# Patient Record
Sex: MALE | Race: WHITE | NOT HISPANIC OR LATINO | URBAN - METROPOLITAN AREA
[De-identification: names, ages, dates, MRNs, and addresses within clinical notes are randomized per-mention and may not be internally consistent; named-entity substitution may affect disease eponyms.]

---

## 2023-03-14 PROBLEM — Z00.00 ENCOUNTER FOR PREVENTIVE HEALTH EXAMINATION: Status: ACTIVE | Noted: 2023-03-14

## 2023-03-20 ENCOUNTER — OUTPATIENT (OUTPATIENT)
Dept: OUTPATIENT SERVICES | Facility: HOSPITAL | Age: 58
LOS: 1 days | End: 2023-03-20
Payer: MEDICAID

## 2023-03-20 DIAGNOSIS — Z00.8 ENCOUNTER FOR OTHER GENERAL EXAMINATION: ICD-10-CM

## 2023-03-20 DIAGNOSIS — R94.39 ABNORMAL RESULT OF OTHER CARDIOVASCULAR FUNCTION STUDY: ICD-10-CM

## 2023-03-20 PROCEDURE — 78452 HT MUSCLE IMAGE SPECT MULT: CPT

## 2023-03-20 PROCEDURE — 78452 HT MUSCLE IMAGE SPECT MULT: CPT | Mod: 26

## 2023-03-20 PROCEDURE — A9500: CPT

## 2023-03-20 PROCEDURE — 93018 CV STRESS TEST I&R ONLY: CPT

## 2023-03-21 DIAGNOSIS — R94.39 ABNORMAL RESULT OF OTHER CARDIOVASCULAR FUNCTION STUDY: ICD-10-CM

## 2023-03-24 VITALS — DIASTOLIC BLOOD PRESSURE: 60 MMHG | SYSTOLIC BLOOD PRESSURE: 138 MMHG

## 2023-03-24 NOTE — H&P CARDIOLOGY - HISTORY OF PRESENT ILLNESS
Patient is a 57y Male who has PMH of CAD S/P PCI, DM, HPL, HTN who has been having SOB on exertion and had (+) NMNST 3/20/23 and presents to the cardiology department for elective cardiac catherization.       < from: NM Nuclear Stress Pharmacologic Multiple (03.20.23 @ 15:37) >    Impression:    1.  Thereis a medium-sized perfusion defect involving the basal to mid   inferior and basal to mid inferolateral walls of the left ventricle. On   the resting images, this defect is primarily reversible suggesting   ischemia.  2.  LVEF is greater than 55%.  3.  This is a low risk positive result.          Vital Signs Last 24 Hrs  T(C): --  T(F): --  HR: --  BP: --  BP(mean): --  RR: --  SpO2: --        Pre cath note:  indication:  [ ] STEMI                [ ] NSTEMI                 [ ] Acute coronary syndrome                   [ ]Unstable Angina   [ ] high risk  [ ] intermediate risk  [ ] low risk                   [ x] Stable Angina     non-invasive testing:     NMNST                     Date:  3/20/23                   result: [ ] high risk  [x ] intermediate risk  [ ] low risk    Anti- Anginal medications:                    [ ] not used                       [ ] used                   [ ] not used but strong indication not to use    Ejection Fraction                   [ ] <29            [ ] 30-39%   [ ] 40-49%     [ ]>50%    CHF                   [ ] active (within last 14 days on meds   [ ] Chronic (on meds but no exacerbation)    COPD                   [ ] mild (on chronic bronchodilators)  [ ] moderate (on chronic steroid therapy)      [ ] severe (indication for home O2 or PACO2 >50)    Other risk factors:                     [ ] Previous MI                     [ ] CVA/ stroke                    [ ] carotid stent/ CEA                    [ ] PVD/PAD- (arterial aneurysm, non-palpable pulses, tortuous vessel with inability to insert catheter, infra-renal dissection, renal or subclavian artery stenosis)                    [ ] diabetic                    [ ] previous CABG                    [ ] Renal Failure     Bleeding Risk: 0.5%      RIGHT RADIAL ARTERY EVALUATION:  ROLANDO TEST: [] Negative          [] Positive  BARBEAU TEST: [] Class A           [] Class B           [] Class C            [] Class D    REVIEW OF SYSTEMS:  CONSTITUTIONAL: No fever, weight loss, or fatigue  CARDIOLOGY: PAtient denies chest pain, shortness of breath or syncopal episodes.   RESPIRATORY: denies shortness of breath, wheezing  NEUROLOGICAL: NO weakness, no focal deficits to report.  ENDOCRINOLOGICAL: no recent change in diabetic medications.   GI: no BRBPR, no N,V,diarrhea.     PHYSICAL EXAM:  · CONSTITUTIONAL:	Well-developed, well nourished    ·RESPIRATORY:   airway patent; breath sounds equal; good air movement; respirations non-labored; clear to auscultation bilaterally; no chest wall tenderness; no intercostal retractions; no rales,rhonchi or wheeze  · CARDIOVASCULAR	regular rate and rhythm  no rub  no murmur  normal PMI  · EXTREMITIES: No cyanosis, clubbing or edema  · VASCULAR: 	Equal and normal pulses (carotid, femoral, dorsalis pedis)  	      EF:   EKG:  Patient is a 57y Male who has PMH of CAD S/P PCI, DM, HPL, HTN who has been having SOB on exertion and had (+) NST 3/20/23 and presents today for [facility] with possible intervention.       < from: NM Nuclear Stress Pharmacologic Multiple (03.20.23 @ 15:37) >    Impression:    1.  Thereis a medium-sized perfusion defect involving the basal to mid   inferior and basal to mid inferolateral walls of the left ventricle. On   the resting images, this defect is primarily reversible suggesting   ischemia.  2.  LVEF is greater than 55%.  3.  This is a low risk positive result.        Pre cath note:  indication:  [ ] STEMI                [ ] NSTEMI                 [ ] Acute coronary syndrome                   [ ]Unstable Angina   [ ] high risk  [ ] intermediate risk  [ ] low risk                   [ x] Stable Angina     non-invasive testing:     NST                     Date:  3/20/23                   result: [ ] high risk  [x ] intermediate risk  [ ] low risk    Anti- Anginal medications:                    [ ] not used                       [ ] used                   [ ] not used but strong indication not to use    Ejection Fraction                   [ ] <29            [ ] 30-39%   [ ] 40-49%     [ ]>50%    CHF                   [ ] active (within last 14 days on meds   [ ] Chronic (on meds but no exacerbation)    COPD                   [ ] mild (on chronic bronchodilators)  [ ] moderate (on chronic steroid therapy)      [ ] severe (indication for home O2 or PACO2 >50)    Other risk factors:                     [ ] Previous MI                     [ ] CVA/ stroke                    [ ] carotid stent/ CEA                    [ ] PVD/PAD- (arterial aneurysm, non-palpable pulses, tortuous vessel with inability to insert catheter, infra-renal dissection, renal or subclavian artery stenosis)                    [ ] diabetic                    [ ] previous CABG                    [ ] Renal Failure     Bleeding Risk: 0.5%    EF 3/20/23: 55%

## 2023-03-27 ENCOUNTER — OUTPATIENT (OUTPATIENT)
Dept: INPATIENT UNIT | Facility: HOSPITAL | Age: 58
LOS: 1 days | Discharge: HOME | End: 2023-03-27
Payer: MEDICAID

## 2023-03-27 DIAGNOSIS — Z95.5 PRESENCE OF CORONARY ANGIOPLASTY IMPLANT AND GRAFT: ICD-10-CM

## 2023-03-27 DIAGNOSIS — R94.39 ABNORMAL RESULT OF OTHER CARDIOVASCULAR FUNCTION STUDY: ICD-10-CM

## 2023-03-27 DIAGNOSIS — R06.02 SHORTNESS OF BREATH: ICD-10-CM

## 2023-03-27 DIAGNOSIS — I25.118 ATHEROSCLEROTIC HEART DISEASE OF NATIVE CORONARY ARTERY WITH OTHER FORMS OF ANGINA PECTORIS: ICD-10-CM

## 2023-03-27 LAB
ANION GAP SERPL CALC-SCNC: 12 MMOL/L — SIGNIFICANT CHANGE UP (ref 7–14)
BUN SERPL-MCNC: 17 MG/DL — SIGNIFICANT CHANGE UP (ref 10–20)
CALCIUM SERPL-MCNC: 9 MG/DL — SIGNIFICANT CHANGE UP (ref 8.4–10.4)
CHLORIDE SERPL-SCNC: 99 MMOL/L — SIGNIFICANT CHANGE UP (ref 98–110)
CO2 SERPL-SCNC: 22 MMOL/L — SIGNIFICANT CHANGE UP (ref 17–32)
CREAT SERPL-MCNC: 1 MG/DL — SIGNIFICANT CHANGE UP (ref 0.7–1.5)
EGFR: 86 ML/MIN/1.73M2 — SIGNIFICANT CHANGE UP
GLUCOSE BLDC GLUCOMTR-MCNC: 149 MG/DL — HIGH (ref 70–99)
GLUCOSE SERPL-MCNC: 144 MG/DL — HIGH (ref 70–99)
HCT VFR BLD CALC: 40.5 % — LOW (ref 42–52)
HCT VFR BLD CALC: 42.3 % — SIGNIFICANT CHANGE UP (ref 42–52)
HGB BLD-MCNC: 12.9 G/DL — LOW (ref 14–18)
HGB BLD-MCNC: 14 G/DL — SIGNIFICANT CHANGE UP (ref 14–18)
MCHC RBC-ENTMCNC: 27.6 PG — SIGNIFICANT CHANGE UP (ref 27–31)
MCHC RBC-ENTMCNC: 28.3 PG — SIGNIFICANT CHANGE UP (ref 27–31)
MCHC RBC-ENTMCNC: 31.9 G/DL — LOW (ref 32–37)
MCHC RBC-ENTMCNC: 33.1 G/DL — SIGNIFICANT CHANGE UP (ref 32–37)
MCV RBC AUTO: 85.6 FL — SIGNIFICANT CHANGE UP (ref 80–94)
MCV RBC AUTO: 86.5 FL — SIGNIFICANT CHANGE UP (ref 80–94)
NRBC # BLD: 0 /100 WBCS — SIGNIFICANT CHANGE UP (ref 0–0)
NRBC # BLD: 0 /100 WBCS — SIGNIFICANT CHANGE UP (ref 0–0)
PLATELET # BLD AUTO: 196 K/UL — SIGNIFICANT CHANGE UP (ref 130–400)
PLATELET # BLD AUTO: 208 K/UL — SIGNIFICANT CHANGE UP (ref 130–400)
POTASSIUM SERPL-MCNC: 6.1 MMOL/L — CRITICAL HIGH (ref 3.5–5)
POTASSIUM SERPL-SCNC: 6.1 MMOL/L — CRITICAL HIGH (ref 3.5–5)
RBC # BLD: 4.68 M/UL — LOW (ref 4.7–6.1)
RBC # BLD: 4.94 M/UL — SIGNIFICANT CHANGE UP (ref 4.7–6.1)
RBC # FLD: 15 % — HIGH (ref 11.5–14.5)
RBC # FLD: 15.2 % — HIGH (ref 11.5–14.5)
SODIUM SERPL-SCNC: 133 MMOL/L — LOW (ref 135–146)
WBC # BLD: 4.35 K/UL — LOW (ref 4.8–10.8)
WBC # BLD: 4.4 K/UL — LOW (ref 4.8–10.8)
WBC # FLD AUTO: 4.35 K/UL — LOW (ref 4.8–10.8)
WBC # FLD AUTO: 4.4 K/UL — LOW (ref 4.8–10.8)

## 2023-03-27 PROCEDURE — C1769: CPT

## 2023-03-27 PROCEDURE — 93010 ELECTROCARDIOGRAM REPORT: CPT

## 2023-03-27 PROCEDURE — 93005 ELECTROCARDIOGRAM TRACING: CPT

## 2023-03-27 PROCEDURE — 92928 PRQ TCAT PLMT NTRAC ST 1 LES: CPT | Mod: LC

## 2023-03-27 PROCEDURE — 36415 COLL VENOUS BLD VENIPUNCTURE: CPT

## 2023-03-27 PROCEDURE — 82962 GLUCOSE BLOOD TEST: CPT

## 2023-03-27 PROCEDURE — 92978 ENDOLUMINL IVUS OCT C 1ST: CPT | Mod: LC

## 2023-03-27 PROCEDURE — C1894: CPT

## 2023-03-27 PROCEDURE — 80048 BASIC METABOLIC PNL TOTAL CA: CPT

## 2023-03-27 PROCEDURE — C1753: CPT

## 2023-03-27 PROCEDURE — C1887: CPT

## 2023-03-27 PROCEDURE — C1725: CPT

## 2023-03-27 PROCEDURE — 93458 L HRT ARTERY/VENTRICLE ANGIO: CPT | Mod: 59

## 2023-03-27 PROCEDURE — 85027 COMPLETE CBC AUTOMATED: CPT

## 2023-03-27 PROCEDURE — C1874: CPT

## 2023-03-27 RX ORDER — CLOPIDOGREL BISULFATE 75 MG/1
1 TABLET, FILM COATED ORAL
Qty: 30 | Refills: 3
Start: 2023-03-27

## 2023-03-27 NOTE — PROGRESS NOTE ADULT - SUBJECTIVE AND OBJECTIVE BOX
Interventional Cardiology discharge Note:  -s/p PCI/MACY OM1    AROLDO BOSS   58y Male    PAST MEDICAL & SURGICAL HISTORY:  CAD S/P percutaneous coronary angioplasty    DM (diabetes mellitus)    Hyperlipidemia    HTN (hypertension)        HPI:  Patient is a 57y Male who has PMH of CAD S/P PCI, DM, HPL, HTN who has been having SOB on exertion and had (+) NST 3/20/23 and presents today for [facility] with possible intervention.       < from: NM Nuclear Stress Pharmacologic Multiple (03.20.23 @ 15:37) >    Impression:    1.  Thereis a medium-sized perfusion defect involving the basal to mid   inferior and basal to mid inferolateral walls of the left ventricle. On   the resting images, this defect is primarily reversible suggesting   ischemia.  2.  LVEF is greater than 55%.  3.  This is a low risk positive result.    Allergies    No Known Allergies      Patient seen and examined at bedside.  Patient without complaints  Denies CP, SOB, palpitations, or dizziness    Vital Signs Last 24 Hrs    HR: --67  BP: -- 137/76  RR: --16  SpO2: -- 100%      REVIEW OF SYSTEMS:          All negative except as mentioned in HPI    PHYSICAL EXAM:           CONSTITUTIONAL: Well-developed; well-nourished; in no acute distress  	SKIN: warm, dry  	HEAD: Normocephalic; atraumatic  	EYES: PERRL.  	ENT: No nasal discharge, airway clear, mucous membranes moist  	NECK: Supple; non tender.  	CARD: +S1, +S2, no murmurs, gallops, or rubs. Regular rate and rhythm    	RESP: No wheezes, rales or rhonchi. CTA B/L  	ABD: soft ntnd, + BS x 4 quadrants  	EXT: moves all extremities,  no clubbing, cyanosis or edema  	NEURO: Alert and oriented x3, no focal deficits          PSYCH: Cooperative, appropriate          VASCULAR:  + Rad / + PTs / + DPs          EXTREMITY:         Right Radial: D-stat in place, access site soft, no hematoma, no pain, + pulses, no sign of infection, no numbness            ECG: pending                                                                                                                 LABS:                        12.9   4.40  )-----------( 208      ( 27 Mar 2023 08:10 )             40.5     03-27    133<L>  |  99  |  17  ----------------------------<  144<H>  6.1<HH>   |  22  |  1.0    Ca    9.0      27 Mar 2023 08:10      A/P:  I discussed the case with Cardiologist Dr. Lam and recommend the following:    S/P PCI:    PCI/MACY OM1    	         Continue DAPT ( Aspirin 81 mg PO Daily and Plavix 75mg PO daily),  B-Blocker, Statin Therapy                   Patient given 30 day supply of ( Aspirin 81 mg daily and Plavix 75 mg daily ) to take at home                   **GI prophylaxis with Pepcid                   CBC @ 1400                   EKG prior to d/c @ 1400                   NS @ 150 ml/hr x 6hrs                   **OOB to chair/Ambulate with assistance                   Monitor access site/ distal pulses                   Patient agreeing to take DAPT for at least one year or as directed by cardiologist                    Pt given instructions on importance of taking antiplatelet medication or risk acute stent thrombosis/death                   Post cath instructions, access site care and activity restrictions reviewed with patient                     Discussed with patient to return to hospital if experience chest pain, shortness breath, dizziness and site bleeding                   Aggressive risk factor modification, diet counseling, smoking cessation discussed with patient                    Benefits of cardiac rehab discussed with patient and all documents sent to cardiac rehab center                   Patient instructed to call cardiac rehab and make initial appointment after first follow-up visit with Cardiologist                    Can discharge patient @ 1600 from cardiac standpoint after ambulating without symptoms, access site wnl, labs and ECG reviewed                    Follow up with Cardiology Dr. Lam in two weeks.  Instructed to call and make an appointment

## 2023-03-30 DIAGNOSIS — R94.31 ABNORMAL ELECTROCARDIOGRAM [ECG] [EKG]: ICD-10-CM

## 2023-03-31 DIAGNOSIS — R94.31 ABNORMAL ELECTROCARDIOGRAM [ECG] [EKG]: ICD-10-CM

## 2023-06-12 PROBLEM — Z00.00 ENCOUNTER FOR PREVENTIVE HEALTH EXAMINATION: Status: ACTIVE | Noted: 2023-06-12

## 2023-06-16 ENCOUNTER — APPOINTMENT (OUTPATIENT)
Dept: CARDIOLOGY | Facility: CLINIC | Age: 58
End: 2023-06-16
Payer: MEDICAID

## 2023-06-16 VITALS
HEIGHT: 68 IN | BODY MASS INDEX: 45.47 KG/M2 | SYSTOLIC BLOOD PRESSURE: 130 MMHG | DIASTOLIC BLOOD PRESSURE: 76 MMHG | WEIGHT: 300 LBS | HEART RATE: 83 BPM

## 2023-06-16 DIAGNOSIS — M79.89 OTHER SPECIFIED SOFT TISSUE DISORDERS: ICD-10-CM

## 2023-06-16 PROCEDURE — 99204 OFFICE O/P NEW MOD 45 MIN: CPT

## 2023-07-04 LAB
ALBUMIN SERPL ELPH-MCNC: 4.2 G/DL
ALP BLD-CCNC: 63 U/L
ALT SERPL-CCNC: 23 U/L
ANION GAP SERPL CALC-SCNC: 12 MMOL/L
AST SERPL-CCNC: 17 U/L
BILIRUB SERPL-MCNC: 0.2 MG/DL
BUN SERPL-MCNC: 11 MG/DL
CALCIUM SERPL-MCNC: 9.4 MG/DL
CHLORIDE SERPL-SCNC: 100 MMOL/L
CHOLEST SERPL-MCNC: 112 MG/DL
CHOLEST/HDLC SERPL: 2.7 RATIO
CO2 SERPL-SCNC: 23 MMOL/L
CREAT SERPL-MCNC: 0.9 MG/DL
EGFR: 98 ML/MIN/1.73M2
ESTIMATED AVERAGE GLUCOSE: 171 MG/DL
GLUCOSE SERPL-MCNC: 106 MG/DL
HBA1C MFR BLD HPLC: 7.6 %
HDLC SERPL-MCNC: 41 MG/DL
LDLC SERPL CALC-MCNC: 54 MG/DL
NONHDLC SERPL-MCNC: 70 MG/DL
NT-PROBNP SERPL-MCNC: 135 PG/ML
POTASSIUM SERPL-SCNC: 4.6 MMOL/L
PROT SERPL-MCNC: 6.6 G/DL
SODIUM SERPL-SCNC: 135 MMOL/L
TRIGL SERPL-MCNC: 80 MG/DL
TSH SERPL-ACNC: 1.52 UIU/ML

## 2023-08-14 ENCOUNTER — APPOINTMENT (OUTPATIENT)
Dept: CARDIOLOGY | Facility: CLINIC | Age: 58
End: 2023-08-14
Payer: MEDICAID

## 2023-08-14 PROCEDURE — 93306 TTE W/DOPPLER COMPLETE: CPT

## 2023-08-14 PROCEDURE — 93970 EXTREMITY STUDY: CPT

## 2023-08-18 ENCOUNTER — APPOINTMENT (OUTPATIENT)
Dept: CARDIOLOGY | Facility: CLINIC | Age: 58
End: 2023-08-18

## 2023-11-07 PROBLEM — I10 ESSENTIAL (PRIMARY) HYPERTENSION: Chronic | Status: ACTIVE | Noted: 2023-03-24

## 2023-11-07 PROBLEM — E11.9 TYPE 2 DIABETES MELLITUS WITHOUT COMPLICATIONS: Chronic | Status: ACTIVE | Noted: 2023-03-24

## 2023-11-07 PROBLEM — I25.10 ATHEROSCLEROTIC HEART DISEASE OF NATIVE CORONARY ARTERY WITHOUT ANGINA PECTORIS: Chronic | Status: ACTIVE | Noted: 2023-03-24

## 2023-11-07 PROBLEM — E78.5 HYPERLIPIDEMIA, UNSPECIFIED: Chronic | Status: ACTIVE | Noted: 2023-03-24

## 2023-12-01 ENCOUNTER — INPATIENT (INPATIENT)
Facility: HOSPITAL | Age: 58
LOS: 5 days | Discharge: HOME | DRG: 194 | End: 2023-12-07
Attending: INTERNAL MEDICINE | Admitting: STUDENT IN AN ORGANIZED HEALTH CARE EDUCATION/TRAINING PROGRAM
Payer: MEDICAID

## 2023-12-01 VITALS
WEIGHT: 300.05 LBS | OXYGEN SATURATION: 88 % | DIASTOLIC BLOOD PRESSURE: 92 MMHG | RESPIRATION RATE: 22 BRPM | HEART RATE: 89 BPM | SYSTOLIC BLOOD PRESSURE: 177 MMHG

## 2023-12-01 DIAGNOSIS — I50.9 HEART FAILURE, UNSPECIFIED: ICD-10-CM

## 2023-12-01 LAB
ALBUMIN SERPL ELPH-MCNC: 3.8 G/DL — SIGNIFICANT CHANGE UP (ref 3.5–5.2)
ALBUMIN SERPL ELPH-MCNC: 3.8 G/DL — SIGNIFICANT CHANGE UP (ref 3.5–5.2)
ALP SERPL-CCNC: 69 U/L — SIGNIFICANT CHANGE UP (ref 30–115)
ALP SERPL-CCNC: 69 U/L — SIGNIFICANT CHANGE UP (ref 30–115)
ALT FLD-CCNC: 47 U/L — HIGH (ref 0–41)
ALT FLD-CCNC: 47 U/L — HIGH (ref 0–41)
ANION GAP SERPL CALC-SCNC: 13 MMOL/L — SIGNIFICANT CHANGE UP (ref 7–14)
ANION GAP SERPL CALC-SCNC: 13 MMOL/L — SIGNIFICANT CHANGE UP (ref 7–14)
AST SERPL-CCNC: 43 U/L — HIGH (ref 0–41)
AST SERPL-CCNC: 43 U/L — HIGH (ref 0–41)
BASE EXCESS BLDV CALC-SCNC: 4.1 MMOL/L — HIGH (ref -2–3)
BASE EXCESS BLDV CALC-SCNC: 4.1 MMOL/L — HIGH (ref -2–3)
BASOPHILS # BLD AUTO: 0.02 K/UL — SIGNIFICANT CHANGE UP (ref 0–0.2)
BASOPHILS # BLD AUTO: 0.02 K/UL — SIGNIFICANT CHANGE UP (ref 0–0.2)
BASOPHILS NFR BLD AUTO: 0.4 % — SIGNIFICANT CHANGE UP (ref 0–1)
BASOPHILS NFR BLD AUTO: 0.4 % — SIGNIFICANT CHANGE UP (ref 0–1)
BILIRUB SERPL-MCNC: 0.3 MG/DL — SIGNIFICANT CHANGE UP (ref 0.2–1.2)
BILIRUB SERPL-MCNC: 0.3 MG/DL — SIGNIFICANT CHANGE UP (ref 0.2–1.2)
BUN SERPL-MCNC: 13 MG/DL — SIGNIFICANT CHANGE UP (ref 10–20)
BUN SERPL-MCNC: 13 MG/DL — SIGNIFICANT CHANGE UP (ref 10–20)
CA-I SERPL-SCNC: 1.15 MMOL/L — SIGNIFICANT CHANGE UP (ref 1.15–1.33)
CA-I SERPL-SCNC: 1.15 MMOL/L — SIGNIFICANT CHANGE UP (ref 1.15–1.33)
CALCIUM SERPL-MCNC: 9.3 MG/DL — SIGNIFICANT CHANGE UP (ref 8.4–10.4)
CALCIUM SERPL-MCNC: 9.3 MG/DL — SIGNIFICANT CHANGE UP (ref 8.4–10.4)
CHLORIDE SERPL-SCNC: 96 MMOL/L — LOW (ref 98–110)
CHLORIDE SERPL-SCNC: 96 MMOL/L — LOW (ref 98–110)
CO2 SERPL-SCNC: 24 MMOL/L — SIGNIFICANT CHANGE UP (ref 17–32)
CO2 SERPL-SCNC: 24 MMOL/L — SIGNIFICANT CHANGE UP (ref 17–32)
CREAT SERPL-MCNC: 0.8 MG/DL — SIGNIFICANT CHANGE UP (ref 0.7–1.5)
CREAT SERPL-MCNC: 0.8 MG/DL — SIGNIFICANT CHANGE UP (ref 0.7–1.5)
EGFR: 101 ML/MIN/1.73M2 — SIGNIFICANT CHANGE UP
EGFR: 101 ML/MIN/1.73M2 — SIGNIFICANT CHANGE UP
EOSINOPHIL # BLD AUTO: 0.12 K/UL — SIGNIFICANT CHANGE UP (ref 0–0.7)
EOSINOPHIL # BLD AUTO: 0.12 K/UL — SIGNIFICANT CHANGE UP (ref 0–0.7)
EOSINOPHIL NFR BLD AUTO: 2.3 % — SIGNIFICANT CHANGE UP (ref 0–8)
EOSINOPHIL NFR BLD AUTO: 2.3 % — SIGNIFICANT CHANGE UP (ref 0–8)
FLUAV AG NPH QL: SIGNIFICANT CHANGE UP
FLUAV AG NPH QL: SIGNIFICANT CHANGE UP
FLUBV AG NPH QL: SIGNIFICANT CHANGE UP
FLUBV AG NPH QL: SIGNIFICANT CHANGE UP
GAS PNL BLDV: 129 MMOL/L — LOW (ref 136–145)
GAS PNL BLDV: 129 MMOL/L — LOW (ref 136–145)
GAS PNL BLDV: SIGNIFICANT CHANGE UP
GLUCOSE BLDC GLUCOMTR-MCNC: 186 MG/DL — HIGH (ref 70–99)
GLUCOSE BLDC GLUCOMTR-MCNC: 186 MG/DL — HIGH (ref 70–99)
GLUCOSE BLDC GLUCOMTR-MCNC: 187 MG/DL — HIGH (ref 70–99)
GLUCOSE BLDC GLUCOMTR-MCNC: 187 MG/DL — HIGH (ref 70–99)
GLUCOSE SERPL-MCNC: 150 MG/DL — HIGH (ref 70–99)
GLUCOSE SERPL-MCNC: 150 MG/DL — HIGH (ref 70–99)
HCO3 BLDV-SCNC: 30 MMOL/L — HIGH (ref 22–29)
HCO3 BLDV-SCNC: 30 MMOL/L — HIGH (ref 22–29)
HCT VFR BLD CALC: 37.6 % — LOW (ref 42–52)
HCT VFR BLD CALC: 37.6 % — LOW (ref 42–52)
HCT VFR BLDA CALC: 37 % — LOW (ref 39–51)
HCT VFR BLDA CALC: 37 % — LOW (ref 39–51)
HGB BLD CALC-MCNC: 12.3 G/DL — LOW (ref 12.6–17.4)
HGB BLD CALC-MCNC: 12.3 G/DL — LOW (ref 12.6–17.4)
HGB BLD-MCNC: 12.2 G/DL — LOW (ref 14–18)
HGB BLD-MCNC: 12.2 G/DL — LOW (ref 14–18)
IMM GRANULOCYTES NFR BLD AUTO: 0.8 % — HIGH (ref 0.1–0.3)
IMM GRANULOCYTES NFR BLD AUTO: 0.8 % — HIGH (ref 0.1–0.3)
LACTATE BLDV-MCNC: 1.7 MMOL/L — SIGNIFICANT CHANGE UP (ref 0.5–2)
LACTATE BLDV-MCNC: 1.7 MMOL/L — SIGNIFICANT CHANGE UP (ref 0.5–2)
LYMPHOCYTES # BLD AUTO: 1.06 K/UL — LOW (ref 1.2–3.4)
LYMPHOCYTES # BLD AUTO: 1.06 K/UL — LOW (ref 1.2–3.4)
LYMPHOCYTES # BLD AUTO: 20.3 % — LOW (ref 20.5–51.1)
LYMPHOCYTES # BLD AUTO: 20.3 % — LOW (ref 20.5–51.1)
MCHC RBC-ENTMCNC: 27.2 PG — SIGNIFICANT CHANGE UP (ref 27–31)
MCHC RBC-ENTMCNC: 27.2 PG — SIGNIFICANT CHANGE UP (ref 27–31)
MCHC RBC-ENTMCNC: 32.4 G/DL — SIGNIFICANT CHANGE UP (ref 32–37)
MCHC RBC-ENTMCNC: 32.4 G/DL — SIGNIFICANT CHANGE UP (ref 32–37)
MCV RBC AUTO: 83.7 FL — SIGNIFICANT CHANGE UP (ref 80–94)
MCV RBC AUTO: 83.7 FL — SIGNIFICANT CHANGE UP (ref 80–94)
MONOCYTES # BLD AUTO: 0.49 K/UL — SIGNIFICANT CHANGE UP (ref 0.1–0.6)
MONOCYTES # BLD AUTO: 0.49 K/UL — SIGNIFICANT CHANGE UP (ref 0.1–0.6)
MONOCYTES NFR BLD AUTO: 9.4 % — HIGH (ref 1.7–9.3)
MONOCYTES NFR BLD AUTO: 9.4 % — HIGH (ref 1.7–9.3)
NEUTROPHILS # BLD AUTO: 3.5 K/UL — SIGNIFICANT CHANGE UP (ref 1.4–6.5)
NEUTROPHILS # BLD AUTO: 3.5 K/UL — SIGNIFICANT CHANGE UP (ref 1.4–6.5)
NEUTROPHILS NFR BLD AUTO: 66.8 % — SIGNIFICANT CHANGE UP (ref 42.2–75.2)
NEUTROPHILS NFR BLD AUTO: 66.8 % — SIGNIFICANT CHANGE UP (ref 42.2–75.2)
NRBC # BLD: 0 /100 WBCS — SIGNIFICANT CHANGE UP (ref 0–0)
NRBC # BLD: 0 /100 WBCS — SIGNIFICANT CHANGE UP (ref 0–0)
NT-PROBNP SERPL-SCNC: 964 PG/ML — HIGH (ref 0–300)
NT-PROBNP SERPL-SCNC: 964 PG/ML — HIGH (ref 0–300)
PCO2 BLDV: 51 MMHG — SIGNIFICANT CHANGE UP (ref 42–55)
PCO2 BLDV: 51 MMHG — SIGNIFICANT CHANGE UP (ref 42–55)
PH BLDV: 7.38 — SIGNIFICANT CHANGE UP (ref 7.32–7.43)
PH BLDV: 7.38 — SIGNIFICANT CHANGE UP (ref 7.32–7.43)
PLATELET # BLD AUTO: 187 K/UL — SIGNIFICANT CHANGE UP (ref 130–400)
PLATELET # BLD AUTO: 187 K/UL — SIGNIFICANT CHANGE UP (ref 130–400)
PMV BLD: 10 FL — SIGNIFICANT CHANGE UP (ref 7.4–10.4)
PMV BLD: 10 FL — SIGNIFICANT CHANGE UP (ref 7.4–10.4)
PO2 BLDV: 43 MMHG — SIGNIFICANT CHANGE UP
PO2 BLDV: 43 MMHG — SIGNIFICANT CHANGE UP
POTASSIUM BLDV-SCNC: 5.3 MMOL/L — HIGH (ref 3.5–5.1)
POTASSIUM BLDV-SCNC: 5.3 MMOL/L — HIGH (ref 3.5–5.1)
POTASSIUM SERPL-MCNC: 5.1 MMOL/L — HIGH (ref 3.5–5)
POTASSIUM SERPL-MCNC: 5.1 MMOL/L — HIGH (ref 3.5–5)
POTASSIUM SERPL-SCNC: 5.1 MMOL/L — HIGH (ref 3.5–5)
POTASSIUM SERPL-SCNC: 5.1 MMOL/L — HIGH (ref 3.5–5)
PROT SERPL-MCNC: 6.6 G/DL — SIGNIFICANT CHANGE UP (ref 6–8)
PROT SERPL-MCNC: 6.6 G/DL — SIGNIFICANT CHANGE UP (ref 6–8)
RBC # BLD: 4.49 M/UL — LOW (ref 4.7–6.1)
RBC # BLD: 4.49 M/UL — LOW (ref 4.7–6.1)
RBC # FLD: 15.1 % — HIGH (ref 11.5–14.5)
RBC # FLD: 15.1 % — HIGH (ref 11.5–14.5)
RSV RNA NPH QL NAA+NON-PROBE: SIGNIFICANT CHANGE UP
RSV RNA NPH QL NAA+NON-PROBE: SIGNIFICANT CHANGE UP
SAO2 % BLDV: 63.5 % — SIGNIFICANT CHANGE UP
SAO2 % BLDV: 63.5 % — SIGNIFICANT CHANGE UP
SARS-COV-2 RNA SPEC QL NAA+PROBE: SIGNIFICANT CHANGE UP
SARS-COV-2 RNA SPEC QL NAA+PROBE: SIGNIFICANT CHANGE UP
SODIUM SERPL-SCNC: 133 MMOL/L — LOW (ref 135–146)
SODIUM SERPL-SCNC: 133 MMOL/L — LOW (ref 135–146)
TROPONIN T SERPL-MCNC: <0.01 NG/ML — SIGNIFICANT CHANGE UP
WBC # BLD: 5.23 K/UL — SIGNIFICANT CHANGE UP (ref 4.8–10.8)
WBC # BLD: 5.23 K/UL — SIGNIFICANT CHANGE UP (ref 4.8–10.8)
WBC # FLD AUTO: 5.23 K/UL — SIGNIFICANT CHANGE UP (ref 4.8–10.8)
WBC # FLD AUTO: 5.23 K/UL — SIGNIFICANT CHANGE UP (ref 4.8–10.8)

## 2023-12-01 PROCEDURE — 93306 TTE W/DOPPLER COMPLETE: CPT

## 2023-12-01 PROCEDURE — 93010 ELECTROCARDIOGRAM REPORT: CPT

## 2023-12-01 PROCEDURE — 0225U NFCT DS DNA&RNA 21 SARSCOV2: CPT

## 2023-12-01 PROCEDURE — 84484 ASSAY OF TROPONIN QUANT: CPT

## 2023-12-01 PROCEDURE — 83036 HEMOGLOBIN GLYCOSYLATED A1C: CPT

## 2023-12-01 PROCEDURE — 83735 ASSAY OF MAGNESIUM: CPT

## 2023-12-01 PROCEDURE — 93005 ELECTROCARDIOGRAM TRACING: CPT

## 2023-12-01 PROCEDURE — 71045 X-RAY EXAM CHEST 1 VIEW: CPT

## 2023-12-01 PROCEDURE — 84443 ASSAY THYROID STIM HORMONE: CPT

## 2023-12-01 PROCEDURE — 80053 COMPREHEN METABOLIC PANEL: CPT

## 2023-12-01 PROCEDURE — 36415 COLL VENOUS BLD VENIPUNCTURE: CPT

## 2023-12-01 PROCEDURE — 99285 EMERGENCY DEPT VISIT HI MDM: CPT

## 2023-12-01 PROCEDURE — 85025 COMPLETE CBC W/AUTO DIFF WBC: CPT

## 2023-12-01 PROCEDURE — 82962 GLUCOSE BLOOD TEST: CPT

## 2023-12-01 PROCEDURE — 94640 AIRWAY INHALATION TREATMENT: CPT

## 2023-12-01 PROCEDURE — 82803 BLOOD GASES ANY COMBINATION: CPT

## 2023-12-01 PROCEDURE — 71045 X-RAY EXAM CHEST 1 VIEW: CPT | Mod: 26

## 2023-12-01 PROCEDURE — 84439 ASSAY OF FREE THYROXINE: CPT

## 2023-12-01 PROCEDURE — 99222 1ST HOSP IP/OBS MODERATE 55: CPT

## 2023-12-01 PROCEDURE — 84145 PROCALCITONIN (PCT): CPT

## 2023-12-01 PROCEDURE — 86803 HEPATITIS C AB TEST: CPT

## 2023-12-01 PROCEDURE — 94660 CPAP INITIATION&MGMT: CPT

## 2023-12-01 RX ORDER — DEXTROSE 50 % IN WATER 50 %
25 SYRINGE (ML) INTRAVENOUS ONCE
Refills: 0 | Status: DISCONTINUED | OUTPATIENT
Start: 2023-12-01 | End: 2023-12-07

## 2023-12-01 RX ORDER — GEMFIBROZIL 600 MG
600 TABLET ORAL
Refills: 0 | Status: DISCONTINUED | OUTPATIENT
Start: 2023-12-01 | End: 2023-12-07

## 2023-12-01 RX ORDER — SODIUM CHLORIDE 9 MG/ML
1000 INJECTION, SOLUTION INTRAVENOUS
Refills: 0 | Status: DISCONTINUED | OUTPATIENT
Start: 2023-12-01 | End: 2023-12-07

## 2023-12-01 RX ORDER — FUROSEMIDE 40 MG
40 TABLET ORAL ONCE
Refills: 0 | Status: COMPLETED | OUTPATIENT
Start: 2023-12-01 | End: 2023-12-01

## 2023-12-01 RX ORDER — INSULIN GLARGINE 100 [IU]/ML
35 INJECTION, SOLUTION SUBCUTANEOUS AT BEDTIME
Refills: 0 | Status: DISCONTINUED | OUTPATIENT
Start: 2023-12-01 | End: 2023-12-07

## 2023-12-01 RX ORDER — METOPROLOL TARTRATE 50 MG
200 TABLET ORAL DAILY
Refills: 0 | Status: DISCONTINUED | OUTPATIENT
Start: 2023-12-01 | End: 2023-12-02

## 2023-12-01 RX ORDER — GLUCAGON INJECTION, SOLUTION 0.5 MG/.1ML
1 INJECTION, SOLUTION SUBCUTANEOUS ONCE
Refills: 0 | Status: DISCONTINUED | OUTPATIENT
Start: 2023-12-01 | End: 2023-12-07

## 2023-12-01 RX ORDER — DEXTROSE 50 % IN WATER 50 %
12.5 SYRINGE (ML) INTRAVENOUS ONCE
Refills: 0 | Status: DISCONTINUED | OUTPATIENT
Start: 2023-12-01 | End: 2023-12-07

## 2023-12-01 RX ORDER — IPRATROPIUM/ALBUTEROL SULFATE 18-103MCG
3 AEROSOL WITH ADAPTER (GRAM) INHALATION EVERY 6 HOURS
Refills: 0 | Status: DISCONTINUED | OUTPATIENT
Start: 2023-12-01 | End: 2023-12-07

## 2023-12-01 RX ORDER — FUROSEMIDE 40 MG
40 TABLET ORAL DAILY
Refills: 0 | Status: DISCONTINUED | OUTPATIENT
Start: 2023-12-01 | End: 2023-12-06

## 2023-12-01 RX ORDER — CLOPIDOGREL BISULFATE 75 MG/1
75 TABLET, FILM COATED ORAL DAILY
Refills: 0 | Status: DISCONTINUED | OUTPATIENT
Start: 2023-12-01 | End: 2023-12-07

## 2023-12-01 RX ORDER — IPRATROPIUM/ALBUTEROL SULFATE 18-103MCG
3 AEROSOL WITH ADAPTER (GRAM) INHALATION ONCE
Refills: 0 | Status: COMPLETED | OUTPATIENT
Start: 2023-12-01 | End: 2023-12-01

## 2023-12-01 RX ORDER — ENOXAPARIN SODIUM 100 MG/ML
40 INJECTION SUBCUTANEOUS EVERY 24 HOURS
Refills: 0 | Status: DISCONTINUED | OUTPATIENT
Start: 2023-12-01 | End: 2023-12-04

## 2023-12-01 RX ORDER — INSULIN GLARGINE 100 [IU]/ML
45 INJECTION, SOLUTION SUBCUTANEOUS AT BEDTIME
Refills: 0 | Status: DISCONTINUED | OUTPATIENT
Start: 2023-12-01 | End: 2023-12-01

## 2023-12-01 RX ORDER — INSULIN LISPRO 100/ML
7 VIAL (ML) SUBCUTANEOUS
Refills: 0 | Status: DISCONTINUED | OUTPATIENT
Start: 2023-12-01 | End: 2023-12-07

## 2023-12-01 RX ORDER — TAMSULOSIN HYDROCHLORIDE 0.4 MG/1
0.4 CAPSULE ORAL AT BEDTIME
Refills: 0 | Status: DISCONTINUED | OUTPATIENT
Start: 2023-12-01 | End: 2023-12-07

## 2023-12-01 RX ORDER — SPIRONOLACTONE 25 MG/1
25 TABLET, FILM COATED ORAL DAILY
Refills: 0 | Status: DISCONTINUED | OUTPATIENT
Start: 2023-12-01 | End: 2023-12-07

## 2023-12-01 RX ORDER — DEXTROSE 50 % IN WATER 50 %
15 SYRINGE (ML) INTRAVENOUS ONCE
Refills: 0 | Status: DISCONTINUED | OUTPATIENT
Start: 2023-12-01 | End: 2023-12-07

## 2023-12-01 RX ORDER — ATORVASTATIN CALCIUM 80 MG/1
80 TABLET, FILM COATED ORAL AT BEDTIME
Refills: 0 | Status: DISCONTINUED | OUTPATIENT
Start: 2023-12-01 | End: 2023-12-07

## 2023-12-01 RX ORDER — FAMOTIDINE 10 MG/ML
40 INJECTION INTRAVENOUS
Refills: 0 | Status: DISCONTINUED | OUTPATIENT
Start: 2023-12-01 | End: 2023-12-07

## 2023-12-01 RX ORDER — ASPIRIN/CALCIUM CARB/MAGNESIUM 324 MG
81 TABLET ORAL DAILY
Refills: 0 | Status: DISCONTINUED | OUTPATIENT
Start: 2023-12-01 | End: 2023-12-04

## 2023-12-01 RX ORDER — INSULIN LISPRO 100/ML
VIAL (ML) SUBCUTANEOUS
Refills: 0 | Status: DISCONTINUED | OUTPATIENT
Start: 2023-12-01 | End: 2023-12-07

## 2023-12-01 RX ADMIN — TAMSULOSIN HYDROCHLORIDE 0.4 MILLIGRAM(S): 0.4 CAPSULE ORAL at 22:42

## 2023-12-01 RX ADMIN — CLOPIDOGREL BISULFATE 75 MILLIGRAM(S): 75 TABLET, FILM COATED ORAL at 13:14

## 2023-12-01 RX ADMIN — FAMOTIDINE 40 MILLIGRAM(S): 10 INJECTION INTRAVENOUS at 18:05

## 2023-12-01 RX ADMIN — ATORVASTATIN CALCIUM 80 MILLIGRAM(S): 80 TABLET, FILM COATED ORAL at 22:43

## 2023-12-01 RX ADMIN — Medication 3 MILLILITER(S): at 15:15

## 2023-12-01 RX ADMIN — Medication 40 MILLIGRAM(S): at 05:52

## 2023-12-01 RX ADMIN — INSULIN GLARGINE 35 UNIT(S): 100 INJECTION, SOLUTION SUBCUTANEOUS at 21:43

## 2023-12-01 RX ADMIN — Medication 600 MILLIGRAM(S): at 18:05

## 2023-12-01 RX ADMIN — Medication 3 MILLILITER(S): at 04:58

## 2023-12-01 RX ADMIN — Medication 81 MILLIGRAM(S): at 13:14

## 2023-12-01 NOTE — H&P ADULT - ATTENDING COMMENTS
59 yo M patient with a PMH of HTN, diabetes, BPH and CAD s/p PCI, presents to the ED with dyspnea on exertion. Patient endorses URI symptoms in last week. Patient endorses worsening shortness of breath and persistent cough in the last few days. States he is more dyspneic with ambulation, and also more short of breath in the mornings when he first wakes up. Also endorses worsening LE edema recently.   No chest pain fevers/ chills, abdominal pain, nausea/ vomiting, changes in bowel/ urinary habits.    In the ED, the patient was HD stable, afebrile, satting 88% on RA.  Labs were significant for mild transaminitis, a pro-BNP= 964; troponin negative   EKG showed NSR, with non- specific ST changes in the lateral leads  A CXR showed patchy R basilar opacity.  COVID Flu RSV negative     Patient admitted with acute hypoxic respiratory failure with suspected heart failure vs PNA.     General: pleasant male, on O2   HNENT: NCAT MMM  CV: RRR no MRG   Pulm: exam limited by body habitus but no appreciable wheezes or ronchi   Abdomen: obese nontender nondistended  Extremities: warm well perfused, trace to 1+ edema of b/l ankles   Neuro: AAOx3       # Dyspnea on exertion  - suspect 2/2 PNA vs underlying HF   - URI symptoms and cough in the last week ( minimal sputum )   - In the ED, pulse oxygenation was 88%, increased to 97% on 3L NC, after receiving albuterol and IV lasix  - CXR with RLL patchy opacity   - RSV, COVID, Flu negative   - BNP elevated 964, Trop negative   - Patient is a smoker, not diagnosed with COPD, not on any inhalers at home  Plan:   - cont Levaquin, check procal if low can consider dc abx   - check full RVP   - prn Albuterol , no indication for steroids at this time, not wheezing on exam   - cont IV Lasix   - TTE pending , consider cardio consult if abnormal   - Will need OP Pulmoanry follow up for PFT's     # HTN- C/w enalapril, HCTZ, and Toprol    # DLD- C/w atorvastatin, and gemfibrozil    # BPH- C/w tamsulosin    # CAD s/ PCI- C/w aspirin, Plavix and atorvastatin 80 mg qday    # DM- On Novolog at home, will do lantus here and     Off oral antidiabetics  Started patient on long- acting + SS + FS    DVT ppx: Lovenox 40 mg qday  GI ppx: not indicated  Diet: DASH + CC  Activity: as tolerated  Dispo: tele 59 yo M patient with a PMH of HTN, diabetes, BPH and CAD s/p PCI, presents to the ED with dyspnea on exertion. Patient endorses URI symptoms in last week. Patient endorses worsening shortness of breath and persistent cough in the last few days. States he is more dyspneic with ambulation, and also more short of breath in the mornings when he first wakes up. Also endorses worsening LE edema recently.   No chest pain fevers/ chills, abdominal pain, nausea/ vomiting, changes in bowel/ urinary habits.    In the ED, the patient was HD stable, afebrile, satting 88% on RA.  Labs were significant for mild transaminitis, a pro-BNP= 964; troponin negative   EKG showed NSR, with non- specific ST changes in the lateral leads  A CXR showed patchy R basilar opacity.  COVID Flu RSV negative     Patient admitted with acute hypoxic respiratory failure with suspected heart failure vs PNA.     General: pleasant male, on O2   HNENT: NCAT MMM  CV: RRR no MRG   Pulm: exam limited by body habitus but no appreciable wheezes or ronchi   Abdomen: obese nontender nondistended  Extremities: warm well perfused, trace to 1+ edema of b/l ankles   Neuro: AAOx3       # Dyspnea on exertion  #Acute Hypoxic Respiratory Failure   - suspect 2/2 PNA vs underlying HF   - URI symptoms and cough in the last week ( minimal sputum )   - In the ED, pulse oxygenation was 88%, increased to 97% on 3L NC, after receiving albuterol and IV lasix  - CXR with RLL patchy opacity   - RSV, COVID, Flu negative   - BNP elevated 964, Trop negative   - Patient is a smoker, not diagnosed with COPD, not on any inhalers at home  Plan:   - cont Levaquin, check procal if low can consider dc abx   - check full RVP   - prn Albuterol , no indication for steroids at this time, not wheezing on exam   - cont IV Lasix   - TTE pending , consider cardio consult if abnormal   - Will need OP Pulmonary follow up for PFT's     # HTN- C/w enalapril, HCTZ, and Toprol    # DLD- C/w atorvastatin, and gemfibrozil    # BPH- C/w tamsulosin    # CAD s/ PCI- C/w aspirin, Plavix and atorvastatin 80 mg qday    # DM- On Novolog at home, will do basal bolus here     DVT ppx: Lovenox 40 mg qday    Dispo: Admit to telemetry for management of suspected pneumonia vs HF and AHRF. Echocardiogram and full RVP/procal pending.       Total time spent to complete patient's bedside assessment, review medical chart, discuss medical plan of care with covering medical team was more than 55 minutes  with >50% of time spent face to face with patient, discussion with patient/family and/or coordination of care      Dhara Gonzalez, DO

## 2023-12-01 NOTE — H&P ADULT - HISTORY OF PRESENT ILLNESS
59 yo M patient with a PMH of HTN, diabetes, BPH and CAD s/p PCI, presents to the ED with dyspnea on exertion.    Patient reports that over the past week he has been having upper URI, with persistent cough, productive with scant sputum production.  Patient has been also getting more dyspneic on ambulation, partially relieved on rest.  No chest pain fevers/ chills, abdominal pain, nausea/ vomiting, changes in bowel/ urinary habits.    In the ED, the patient was HD stable, afebrile, satting 88% on RA.  Labs were significant for mild transaminitis, a pro-BNP= 964.  VBG showed: pH= 7.38/51/43/30/63.5%.  EKG showed NSR, with non- specific ST changes in the lateral leads, a RVP was negative.  A CXR showed patchy opacity over the right lower lobe with possible volume overload.    Patient received in the ED IV lasix and albuterol, with symptomatic relief after, and he will be admitted for management.

## 2023-12-01 NOTE — ED PROVIDER NOTE - ATTENDING CONTRIBUTION TO CARE
58 M with Hx of CAD s/p stents (most recent in 03/2023), DM, HLD, HTN Coming in with complaints of shortness of breath.  sx ongoing and progressive for 2 days. + b/l LE edema. + GARNER. + orthopniae. no chest pain, diaphoresis, n,v,syncope, trauma.    vss  gen- NAD, aaox3  card-rrr  lungs-mild increased w/o , lower lobe crackles   abd-sntnd, no guarding or rebound  neuro- full str/sensation, cn ii-xii grossly intact, normal coordination   LE- 1+ b/l LE edema

## 2023-12-01 NOTE — H&P ADULT - NSHPPHYSICALEXAM_GEN_ALL_CORE
General: NAD, comfortable  CV: RRR  Chest: Decreased breath sounds bibasilarly  Abdomen: Soft, non- distended  Extremities: 1+ pitting edema, no cyanosis

## 2023-12-01 NOTE — ED PROVIDER NOTE - CLINICAL SUMMARY MEDICAL DECISION MAKING FREE TEXT BOX
ed w/u most suggestive of chf exacerbation  story and exam c/w chf   ekg w/o acute ischaemia findings, bnp elevated   lasix given, will admit to med tele for further management and diuresis

## 2023-12-01 NOTE — ED PROVIDER NOTE - OBJECTIVE STATEMENT
58 M with Hx of CAD s/p stents (most recent in 03/2023), DM, HLD, HTN Coming in with complaints of shortness of breath.  Patient reports in the short of breath for the past 2 days.  Most notably when he wakes up in the morning he starts coughing and feels short of breath.  Notes he has to sleep sitting up, uncomfortable and lying down.  Denies any chest pain, diaphoresis, nausea, any recent URI symptoms, or fever. Pt's cardiologist is Dr. Lam. 58 M with Hx of CAD s/p stents (most recent in 03/2023), DM, HLD, HTN Coming in with complaints of shortness of breath.  Patient reports in the short of breath for the past 2 days.  Most notably when he wakes up in the morning he starts coughing and feels short of breath.  Notes he has to sleep sitting up, uncomfortable and lying down.Also with some worsening bilateral LE swelling.   Denies any chest pain, diaphoresis, nausea, any recent URI symptoms, or fever. Pt's cardiologist is Dr. Lam.

## 2023-12-01 NOTE — PATIENT PROFILE ADULT - OVER THE PAST TWO WEEKS, HAVE YOU FELT LITTLE INTEREST OR PLEASURE IN DOING THINGS?
Blood cultures obtained from per policy. Set  two of two drawn at this time.              José Tomlin RN  03/26/20 5848 no

## 2023-12-01 NOTE — CONSULT NOTE ADULT - SUBJECTIVE AND OBJECTIVE BOX
Patient is a 58y old  Male who presents with a chief complaint of Shortness of breath (01 Dec 2023 11:45)      HPI:  57 yo M patient with a PMH of HTN, diabetes, BPH and CAD s/p PCI OM1 3/23 with 4.0x 22 susan MACY, presents to the ED with dyspnea on exertion.Patient reports that over the past week he has been having upper URI, with persistent cough, productive with scant sputum production.  Patient has been also getting more dyspneic on ambulation, partially relieved on rest.  No chest pain fevers/ chills, abdominal pain, nausea/ vomiting, changes in bowel/ urinary habits.      PAST MEDICAL & SURGICAL HISTORY:  CAD S/P percutaneous coronary angioplasty      DM (diabetes mellitus)      Hyperlipidemia      HTN (hypertension)          PREVIOUS DIAGNOSTIC TESTING:      ECHO  FINDINGS:    STRESS  FINDINGS:    CATHETERIZATION  FINDINGS:    MEDICATIONS  (STANDING):  albuterol/ipratropium for Nebulization 3 milliLiter(s) Nebulizer every 6 hours  aspirin  chewable 81 milliGRAM(s) Oral daily  atorvastatin 80 milliGRAM(s) Oral at bedtime  clopidogrel Tablet 75 milliGRAM(s) Oral daily  dextrose 5%. 1000 milliLiter(s) (50 mL/Hr) IV Continuous <Continuous>  dextrose 5%. 1000 milliLiter(s) (100 mL/Hr) IV Continuous <Continuous>  dextrose 50% Injectable 25 Gram(s) IV Push once  dextrose 50% Injectable 25 Gram(s) IV Push once  dextrose 50% Injectable 12.5 Gram(s) IV Push once  enalapril 20 milliGRAM(s) Oral daily  enoxaparin Injectable 40 milliGRAM(s) SubCutaneous every 24 hours  famotidine    Tablet 40 milliGRAM(s) Oral two times a day  furosemide   Injectable 40 milliGRAM(s) IV Push daily  gemfibrozil 600 milliGRAM(s) Oral two times a day  glucagon  Injectable 1 milliGRAM(s) IntraMuscular once  hydrochlorothiazide 25 milliGRAM(s) Oral daily  insulin glargine Injectable (LANTUS) 35 Unit(s) SubCutaneous at bedtime  insulin lispro (ADMELOG) corrective regimen sliding scale   SubCutaneous three times a day before meals  insulin lispro Injectable (ADMELOG) 7 Unit(s) SubCutaneous three times a day before meals  levoFLOXacin IVPB      metoprolol succinate  milliGRAM(s) Oral daily  spironolactone 25 milliGRAM(s) Oral daily  tamsulosin 0.4 milliGRAM(s) Oral at bedtime    MEDICATIONS  (PRN):  dextrose Oral Gel 15 Gram(s) Oral once PRN Blood Glucose LESS THAN 70 milliGRAM(s)/deciliter      FAMILY HISTORY:      SOCIAL HISTORY:  CIGARETTES:    ALCOHOL:    REVIEW OF SYSTEMS:  CONSTITUTIONAL: No fever, weight loss, or fatigue  NECK: No pain or stiffness  RESPIRATORY: reports SOB and  cough  CARDIOVASCULAR: No chest pain, palpitations, dizziness, or leg swelling  GASTROINTESTINAL: No abdominal or epigastric pain. No nausea, vomiting, or hematemesis; No diarrhea or constipation. No melena or hematochezia.  GENITOURINARY: No dysuria, frequency, hematuria, or incontinence  NEUROLOGICAL: No headaches, memory loss, loss of strength, numbness, or tremors  SKIN: No itching, burning, rashes, or lesions   ENDOCRINE: No heat or cold intolerance; No hair loss  MUSCULOSKELETAL: No joint pain or swelling; No muscle, back, or extremity pain  HEME/LYMPH: No easy bruising, or bleeding gums          Vital Signs Last 24 Hrs  T(C): 36.4 (01 Dec 2023 16:03), Max: 36.8 (01 Dec 2023 07:04)  T(F): 97.5 (01 Dec 2023 16:03), Max: 98.3 (01 Dec 2023 07:04)  HR: 89 (01 Dec 2023 16:03) (84 - 89)  BP: 142/68 (01 Dec 2023 16:03) (141/66 - 177/92)  BP(mean): 98 (01 Dec 2023 16:03) (98 - 98)  RR: 20 (01 Dec 2023 16:03) (20 - 22)  SpO2: 100% (01 Dec 2023 16:03) (88% - 100%)    Parameters below as of 01 Dec 2023 16:03  Patient On (Oxygen Delivery Method): nasal cannula            PHYSICAL EXAM:  GENERAL: NAD, well-groomed, well-developed  HEAD:  Atraumatic, Normocephalic  NECK: Supple, No JVD, Normal thyroid  NERVOUS SYSTEM:  Alert & Oriented X3, Good concentration  CHEST/LUNG: Clear to anterior auscultation   HEART: Regular rate and rhythm; Systolic murmur   ABDOMEN: Soft, Nontender, Nondistended; Bowel sounds present  EXTREMITIES:  +1 edema  SKIN: No rashes or lesions    INTERPRETATION OF TELEMETRY:    ECG:    I&O's Detail      LABS:                        12.2   5.23  )-----------( 187      ( 01 Dec 2023 04:45 )             37.6     12-01    133<L>  |  96<L>  |  13  ----------------------------<  150<H>  5.1<H>   |  24  |  0.8    Ca    9.3      01 Dec 2023 04:45    TPro  6.6  /  Alb  3.8  /  TBili  0.3  /  DBili  x   /  AST  43<H>  /  ALT  47<H>  /  AlkPhos  69  12-01    CARDIAC MARKERS ( 01 Dec 2023 16:16 )  x     / <0.01 ng/mL / x     / x     / x      CARDIAC MARKERS ( 01 Dec 2023 04:45 )  x     / <0.01 ng/mL / x     / x     / x            Urinalysis Basic - ( 01 Dec 2023 04:45 )    Color: x / Appearance: x / SG: x / pH: x  Gluc: 150 mg/dL / Ketone: x  / Bili: x / Urobili: x   Blood: x / Protein: x / Nitrite: x   Leuk Esterase: x / RBC: x / WBC x   Sq Epi: x / Non Sq Epi: x / Bacteria: x      I&O's Summary      RADIOLOGY & ADDITIONAL STUDIES:        enalapril 20 milliGRAM(s) Oral daily  furosemide   Injectable 40 milliGRAM(s) IV Push daily  hydrochlorothiazide 25 milliGRAM(s) Oral daily  metoprolol succinate  milliGRAM(s) Oral daily  spironolactone 25 milliGRAM(s) Oral daily

## 2023-12-01 NOTE — H&P ADULT - NSHPLABSRESULTS_GEN_ALL_CORE
LABS:                          12.2   5.23  )-----------( 187      ( 01 Dec 2023 04:45 )             37.6     12-01    133<L>  |  96<L>  |  13  ----------------------------<  150<H>  5.1<H>   |  24  |  0.8    Ca    9.3      01 Dec 2023 04:45    TPro  6.6  /  Alb  3.8  /  TBili  0.3  /  DBili  x   /  AST  43<H>  /  ALT  47<H>  /  AlkPhos  69  12-01    LIVER FUNCTIONS - ( 01 Dec 2023 04:45 )  Alb: 3.8 g/dL / Pro: 6.6 g/dL / ALK PHOS: 69 U/L / ALT: 47 U/L / AST: 43 U/L / GGT: x             Urinalysis Basic - ( 01 Dec 2023 04:45 )    Color: x / Appearance: x / SG: x / pH: x  Gluc: 150 mg/dL / Ketone: x  / Bili: x / Urobili: x   Blood: x / Protein: x / Nitrite: x   Leuk Esterase: x / RBC: x / WBC x   Sq Epi: x / Non Sq Epi: x / Bacteria: x

## 2023-12-01 NOTE — PATIENT PROFILE ADULT - FUNCTIONAL ASSESSMENT - DAILY ACTIVITY 1.
Addendum Note by Cora Cat MD at 4/27/2020  9:00 AM     Author: Cora Cat MD Service: -- Author Type: Physician    Filed: 6/9/2020  8:50 AM Encounter Date: 4/27/2020 Status: Signed    : Cora Cat MD (Physician)    Addended by: CORA CAT on: 6/9/2020 08:50 AM        Modules accepted: Orders         3 = A little assistance

## 2023-12-01 NOTE — CONSULT NOTE ADULT - ASSESSMENT
CHF exacerbation  CAD s/p PCI >10 yrs ago in [place]  S/p PCI to OM1 3/23 with 4.0x 22 susan , patent stent in distal branch of OM1 with mild IRS  Obesity  DM    - Management as perp primary team  - Continue diuresis  - Monitor I/O  - TTE   - Continue DAPT and Statin  - Will follow up as needed

## 2023-12-01 NOTE — H&P ADULT - ASSESSMENT
59 yo M patient with a PMH of HTN, diabetes, DLD, BPH and CAD s/p PCI, presents to the ED with dyspnea on exertion.    # Dyspnea on exertion    Patient has been having over the past week upper URI symptoms, with worsening cough  Productive, but scant sputum  No fevers, chills, chest pain, abdominal pain, changes in bowel/ urinary habits  In the ED, pulse oxygenation was 88%, increased to 97% on 3L NC, after receiving albuterol and IV lasix  CXR showed possible volume overload and possible right lower lobe pneumonia  - Patient is a smoker, not diagnosed with COPD, not on any inhalers at home; will start patient on albuterol q6h and solumedrol 60 mg BID. Might need OP PFTs  - Patient has multiple CV risk factors with 1+ pitting lower extremities edema and relief after receiving lasix, will start patient on Lasix 40 mg IV qday and ordered TTE; consult cardiology after TTE results  - In the setting of possible pneumonia, ordered levofloxacin 750 mg IV and procalcitonin; if negative, please discontinue antibiotics    # HTN    C/w enalapril and Toprol    # DLD    C/w atorvastatin, HCTZ and gemfibrozil    # BPH    C/w tamsulosin    # CAD s/ PCI    Recent PCI   C/w aspirin, Plavix and atorvastatin 80 mg qday    # DM    Off oral antidiabetics  Started patient on long- acting + SS + FS    DVT ppx: Lovenox 40 mg qday  GI ppx: not indicated  Diet: DASH + CC  Activity: as tolerated  Dispo: tele

## 2023-12-01 NOTE — PATIENT PROFILE ADULT - FALL HARM RISK - RISK INTERVENTIONS

## 2023-12-01 NOTE — ED PROVIDER NOTE - PHYSICAL EXAMINATION
CONSTITUTIONAL: NAD  SKIN: Warm dry  HEAD: NCAT  EYES: NL inspection  ENT: MMM  CARD: RRR  RESP: minimal crackles throughout, decreased basilar breath sounds  ABD: Soft, non tender, no rebound or guarding, large but at baseline  EXT: no pedal edema  NEURO: Grossly unremarkable  PSYCH: Cooperative, appropriate.

## 2023-12-02 LAB
A1C WITH ESTIMATED AVERAGE GLUCOSE RESULT: 8.2 % — HIGH (ref 4–5.6)
A1C WITH ESTIMATED AVERAGE GLUCOSE RESULT: 8.2 % — HIGH (ref 4–5.6)
ALBUMIN SERPL ELPH-MCNC: 4 G/DL — SIGNIFICANT CHANGE UP (ref 3.5–5.2)
ALBUMIN SERPL ELPH-MCNC: 4 G/DL — SIGNIFICANT CHANGE UP (ref 3.5–5.2)
ALP SERPL-CCNC: 67 U/L — SIGNIFICANT CHANGE UP (ref 30–115)
ALP SERPL-CCNC: 67 U/L — SIGNIFICANT CHANGE UP (ref 30–115)
ALT FLD-CCNC: 39 U/L — SIGNIFICANT CHANGE UP (ref 0–41)
ALT FLD-CCNC: 39 U/L — SIGNIFICANT CHANGE UP (ref 0–41)
ANION GAP SERPL CALC-SCNC: 13 MMOL/L — SIGNIFICANT CHANGE UP (ref 7–14)
ANION GAP SERPL CALC-SCNC: 13 MMOL/L — SIGNIFICANT CHANGE UP (ref 7–14)
AST SERPL-CCNC: 22 U/L — SIGNIFICANT CHANGE UP (ref 0–41)
AST SERPL-CCNC: 22 U/L — SIGNIFICANT CHANGE UP (ref 0–41)
BASOPHILS # BLD AUTO: 0.02 K/UL — SIGNIFICANT CHANGE UP (ref 0–0.2)
BASOPHILS # BLD AUTO: 0.02 K/UL — SIGNIFICANT CHANGE UP (ref 0–0.2)
BASOPHILS NFR BLD AUTO: 0.6 % — SIGNIFICANT CHANGE UP (ref 0–1)
BASOPHILS NFR BLD AUTO: 0.6 % — SIGNIFICANT CHANGE UP (ref 0–1)
BILIRUB SERPL-MCNC: 0.6 MG/DL — SIGNIFICANT CHANGE UP (ref 0.2–1.2)
BILIRUB SERPL-MCNC: 0.6 MG/DL — SIGNIFICANT CHANGE UP (ref 0.2–1.2)
BUN SERPL-MCNC: 12 MG/DL — SIGNIFICANT CHANGE UP (ref 10–20)
BUN SERPL-MCNC: 12 MG/DL — SIGNIFICANT CHANGE UP (ref 10–20)
CALCIUM SERPL-MCNC: 9.2 MG/DL — SIGNIFICANT CHANGE UP (ref 8.4–10.5)
CALCIUM SERPL-MCNC: 9.2 MG/DL — SIGNIFICANT CHANGE UP (ref 8.4–10.5)
CHLORIDE SERPL-SCNC: 96 MMOL/L — LOW (ref 98–110)
CHLORIDE SERPL-SCNC: 96 MMOL/L — LOW (ref 98–110)
CO2 SERPL-SCNC: 27 MMOL/L — SIGNIFICANT CHANGE UP (ref 17–32)
CO2 SERPL-SCNC: 27 MMOL/L — SIGNIFICANT CHANGE UP (ref 17–32)
CREAT SERPL-MCNC: 0.9 MG/DL — SIGNIFICANT CHANGE UP (ref 0.7–1.5)
CREAT SERPL-MCNC: 0.9 MG/DL — SIGNIFICANT CHANGE UP (ref 0.7–1.5)
EGFR: 98 ML/MIN/1.73M2 — SIGNIFICANT CHANGE UP
EGFR: 98 ML/MIN/1.73M2 — SIGNIFICANT CHANGE UP
EOSINOPHIL # BLD AUTO: 0.1 K/UL — SIGNIFICANT CHANGE UP (ref 0–0.7)
EOSINOPHIL # BLD AUTO: 0.1 K/UL — SIGNIFICANT CHANGE UP (ref 0–0.7)
EOSINOPHIL NFR BLD AUTO: 2.8 % — SIGNIFICANT CHANGE UP (ref 0–8)
EOSINOPHIL NFR BLD AUTO: 2.8 % — SIGNIFICANT CHANGE UP (ref 0–8)
ESTIMATED AVERAGE GLUCOSE: 189 MG/DL — HIGH (ref 68–114)
ESTIMATED AVERAGE GLUCOSE: 189 MG/DL — HIGH (ref 68–114)
GLUCOSE BLDC GLUCOMTR-MCNC: 173 MG/DL — HIGH (ref 70–99)
GLUCOSE BLDC GLUCOMTR-MCNC: 173 MG/DL — HIGH (ref 70–99)
GLUCOSE BLDC GLUCOMTR-MCNC: 196 MG/DL — HIGH (ref 70–99)
GLUCOSE BLDC GLUCOMTR-MCNC: 196 MG/DL — HIGH (ref 70–99)
GLUCOSE BLDC GLUCOMTR-MCNC: 229 MG/DL — HIGH (ref 70–99)
GLUCOSE BLDC GLUCOMTR-MCNC: 229 MG/DL — HIGH (ref 70–99)
GLUCOSE BLDC GLUCOMTR-MCNC: 257 MG/DL — HIGH (ref 70–99)
GLUCOSE BLDC GLUCOMTR-MCNC: 257 MG/DL — HIGH (ref 70–99)
GLUCOSE SERPL-MCNC: 175 MG/DL — HIGH (ref 70–99)
GLUCOSE SERPL-MCNC: 175 MG/DL — HIGH (ref 70–99)
HCT VFR BLD CALC: 37.7 % — LOW (ref 42–52)
HCT VFR BLD CALC: 37.7 % — LOW (ref 42–52)
HCV AB S/CO SERPL IA: 0.04 COI — SIGNIFICANT CHANGE UP
HCV AB S/CO SERPL IA: 0.04 COI — SIGNIFICANT CHANGE UP
HCV AB SERPL-IMP: SIGNIFICANT CHANGE UP
HCV AB SERPL-IMP: SIGNIFICANT CHANGE UP
HGB BLD-MCNC: 12.3 G/DL — LOW (ref 14–18)
HGB BLD-MCNC: 12.3 G/DL — LOW (ref 14–18)
IMM GRANULOCYTES NFR BLD AUTO: 0.3 % — SIGNIFICANT CHANGE UP (ref 0.1–0.3)
IMM GRANULOCYTES NFR BLD AUTO: 0.3 % — SIGNIFICANT CHANGE UP (ref 0.1–0.3)
LYMPHOCYTES # BLD AUTO: 1.13 K/UL — LOW (ref 1.2–3.4)
LYMPHOCYTES # BLD AUTO: 1.13 K/UL — LOW (ref 1.2–3.4)
LYMPHOCYTES # BLD AUTO: 32.1 % — SIGNIFICANT CHANGE UP (ref 20.5–51.1)
LYMPHOCYTES # BLD AUTO: 32.1 % — SIGNIFICANT CHANGE UP (ref 20.5–51.1)
MAGNESIUM SERPL-MCNC: 1.9 MG/DL — SIGNIFICANT CHANGE UP (ref 1.8–2.4)
MAGNESIUM SERPL-MCNC: 1.9 MG/DL — SIGNIFICANT CHANGE UP (ref 1.8–2.4)
MCHC RBC-ENTMCNC: 27.1 PG — SIGNIFICANT CHANGE UP (ref 27–31)
MCHC RBC-ENTMCNC: 27.1 PG — SIGNIFICANT CHANGE UP (ref 27–31)
MCHC RBC-ENTMCNC: 32.6 G/DL — SIGNIFICANT CHANGE UP (ref 32–37)
MCHC RBC-ENTMCNC: 32.6 G/DL — SIGNIFICANT CHANGE UP (ref 32–37)
MCV RBC AUTO: 83 FL — SIGNIFICANT CHANGE UP (ref 80–94)
MCV RBC AUTO: 83 FL — SIGNIFICANT CHANGE UP (ref 80–94)
MONOCYTES # BLD AUTO: 0.47 K/UL — SIGNIFICANT CHANGE UP (ref 0.1–0.6)
MONOCYTES # BLD AUTO: 0.47 K/UL — SIGNIFICANT CHANGE UP (ref 0.1–0.6)
MONOCYTES NFR BLD AUTO: 13.4 % — HIGH (ref 1.7–9.3)
MONOCYTES NFR BLD AUTO: 13.4 % — HIGH (ref 1.7–9.3)
NEUTROPHILS # BLD AUTO: 1.79 K/UL — SIGNIFICANT CHANGE UP (ref 1.4–6.5)
NEUTROPHILS # BLD AUTO: 1.79 K/UL — SIGNIFICANT CHANGE UP (ref 1.4–6.5)
NEUTROPHILS NFR BLD AUTO: 50.8 % — SIGNIFICANT CHANGE UP (ref 42.2–75.2)
NEUTROPHILS NFR BLD AUTO: 50.8 % — SIGNIFICANT CHANGE UP (ref 42.2–75.2)
NRBC # BLD: 0 /100 WBCS — SIGNIFICANT CHANGE UP (ref 0–0)
NRBC # BLD: 0 /100 WBCS — SIGNIFICANT CHANGE UP (ref 0–0)
PLATELET # BLD AUTO: 173 K/UL — SIGNIFICANT CHANGE UP (ref 130–400)
PLATELET # BLD AUTO: 173 K/UL — SIGNIFICANT CHANGE UP (ref 130–400)
PMV BLD: 10.3 FL — SIGNIFICANT CHANGE UP (ref 7.4–10.4)
PMV BLD: 10.3 FL — SIGNIFICANT CHANGE UP (ref 7.4–10.4)
POTASSIUM SERPL-MCNC: 3.9 MMOL/L — SIGNIFICANT CHANGE UP (ref 3.5–5)
POTASSIUM SERPL-MCNC: 3.9 MMOL/L — SIGNIFICANT CHANGE UP (ref 3.5–5)
POTASSIUM SERPL-SCNC: 3.9 MMOL/L — SIGNIFICANT CHANGE UP (ref 3.5–5)
POTASSIUM SERPL-SCNC: 3.9 MMOL/L — SIGNIFICANT CHANGE UP (ref 3.5–5)
PROCALCITONIN SERPL-MCNC: 0.06 NG/ML — SIGNIFICANT CHANGE UP (ref 0.02–0.1)
PROCALCITONIN SERPL-MCNC: 0.06 NG/ML — SIGNIFICANT CHANGE UP (ref 0.02–0.1)
PROT SERPL-MCNC: 6.6 G/DL — SIGNIFICANT CHANGE UP (ref 6–8)
PROT SERPL-MCNC: 6.6 G/DL — SIGNIFICANT CHANGE UP (ref 6–8)
RAPID RVP RESULT: SIGNIFICANT CHANGE UP
RAPID RVP RESULT: SIGNIFICANT CHANGE UP
RBC # BLD: 4.54 M/UL — LOW (ref 4.7–6.1)
RBC # BLD: 4.54 M/UL — LOW (ref 4.7–6.1)
RBC # FLD: 15.4 % — HIGH (ref 11.5–14.5)
RBC # FLD: 15.4 % — HIGH (ref 11.5–14.5)
SARS-COV-2 RNA SPEC QL NAA+PROBE: SIGNIFICANT CHANGE UP
SARS-COV-2 RNA SPEC QL NAA+PROBE: SIGNIFICANT CHANGE UP
SODIUM SERPL-SCNC: 136 MMOL/L — SIGNIFICANT CHANGE UP (ref 135–146)
SODIUM SERPL-SCNC: 136 MMOL/L — SIGNIFICANT CHANGE UP (ref 135–146)
WBC # BLD: 3.52 K/UL — LOW (ref 4.8–10.8)
WBC # BLD: 3.52 K/UL — LOW (ref 4.8–10.8)
WBC # FLD AUTO: 3.52 K/UL — LOW (ref 4.8–10.8)
WBC # FLD AUTO: 3.52 K/UL — LOW (ref 4.8–10.8)

## 2023-12-02 PROCEDURE — 99233 SBSQ HOSP IP/OBS HIGH 50: CPT

## 2023-12-02 PROCEDURE — 99223 1ST HOSP IP/OBS HIGH 75: CPT

## 2023-12-02 RX ADMIN — Medication 3: at 16:34

## 2023-12-02 RX ADMIN — SPIRONOLACTONE 25 MILLIGRAM(S): 25 TABLET, FILM COATED ORAL at 06:16

## 2023-12-02 RX ADMIN — Medication 3 MILLILITER(S): at 20:56

## 2023-12-02 RX ADMIN — Medication 200 MILLIGRAM(S): at 06:15

## 2023-12-02 RX ADMIN — Medication 7 UNIT(S): at 07:50

## 2023-12-02 RX ADMIN — INSULIN GLARGINE 35 UNIT(S): 100 INJECTION, SOLUTION SUBCUTANEOUS at 21:46

## 2023-12-02 RX ADMIN — ENOXAPARIN SODIUM 40 MILLIGRAM(S): 100 INJECTION SUBCUTANEOUS at 06:20

## 2023-12-02 RX ADMIN — Medication 3 MILLILITER(S): at 13:41

## 2023-12-02 RX ADMIN — Medication 600 MILLIGRAM(S): at 06:16

## 2023-12-02 RX ADMIN — TAMSULOSIN HYDROCHLORIDE 0.4 MILLIGRAM(S): 0.4 CAPSULE ORAL at 21:46

## 2023-12-02 RX ADMIN — FAMOTIDINE 40 MILLIGRAM(S): 10 INJECTION INTRAVENOUS at 06:16

## 2023-12-02 RX ADMIN — CLOPIDOGREL BISULFATE 75 MILLIGRAM(S): 75 TABLET, FILM COATED ORAL at 11:18

## 2023-12-02 RX ADMIN — Medication 7 UNIT(S): at 11:40

## 2023-12-02 RX ADMIN — FAMOTIDINE 40 MILLIGRAM(S): 10 INJECTION INTRAVENOUS at 17:08

## 2023-12-02 RX ADMIN — Medication 7 UNIT(S): at 16:34

## 2023-12-02 RX ADMIN — Medication 1: at 07:50

## 2023-12-02 RX ADMIN — Medication 3 MILLILITER(S): at 07:48

## 2023-12-02 RX ADMIN — Medication 40 MILLIGRAM(S): at 06:15

## 2023-12-02 RX ADMIN — Medication 2: at 11:40

## 2023-12-02 RX ADMIN — Medication 81 MILLIGRAM(S): at 11:18

## 2023-12-02 RX ADMIN — Medication 600 MILLIGRAM(S): at 17:08

## 2023-12-02 RX ADMIN — ATORVASTATIN CALCIUM 80 MILLIGRAM(S): 80 TABLET, FILM COATED ORAL at 21:46

## 2023-12-02 NOTE — CONSULT NOTE ADULT - SUBJECTIVE AND OBJECTIVE BOX
Cardiologist: Dr. Cole Thomas      HPI:  57 yo M patient with a PMH of HTN, diabetes, BPH and CAD s/p PCI, presents to the ED with dyspnea on exertion.    Patient reports that over the past week he has been having upper URI, with persistent cough, productive with scant sputum production.  Patient has been also getting more dyspneic on ambulation, partially relieved on rest.  No chest pain fevers/ chills, abdominal pain, nausea/ vomiting, changes in bowel/ urinary habits.    In the ED, the patient was HD stable, afebrile, satting 88% on RA.  Labs were significant for mild transaminitis, a pro-BNP= 964.  VBG showed: pH= 7.38/51/43/30/63.5%.  EKG showed NSR, with non- specific ST changes in the lateral leads, a RVP was negative.  A CXR showed patchy opacity over the right lower lobe with possible volume overload.    Patient received in the ED IV lasix and albuterol, with symptomatic relief after, and he will be admitted for management. (01 Dec 2023 11:45)    Electrophysiology:  57 yo M patient with a PMH of HTN, diabetes, BPH and CAD s/p PCI (last one on 3/2023), presents to the ED with dyspnea on exertion. EP was called due to sinus pause on telemetry at 1:48 am on 2023.   Patient denies h/o dizziness, presyncope, or syncope in the past. Patient reports sleeping at the time of the pause and does not recall anything unusual.    REVIEW OF SYSTEMS  [x] A ten-point review of systems was otherwise negative except as noted.  [ ] Due to altered mental status/intubation, subjective information were not able to be obtained from the patient. History was obtained, to the extent possible, from review of the chart and collateral sources of information.    PAST MEDICAL & SURGICAL HISTORY:  CAD S/P percutaneous coronary angioplasty  DM (diabetes mellitus)  Hyperlipidemia  HTN (hypertension)    Home Medications:  aspirin 81 mg oral delayed release capsule: 81 milligram(s) orally once a day (01 Dec 2023 11:39)  atorvastatin 80 mg oral tablet: 1 tab(s) orally once a day (01 Dec 2023 11:40)  clopidogrel 75 mg oral tablet: 1 tab(s) orally once a day (01 Dec 2023 11:40)  enalapril 20 mg oral tablet: 1 tab(s) orally once a day (01 Dec 2023 11:40)  famotidine 40 mg oral tablet: 1 tab(s) orally 2 times a day (01 Dec 2023 11:41)  gemfibrozil 600 mg oral tablet: 1 tab(s) orally once a day (01 Dec 2023 11:41)  glipiZIDE 5 mg oral tablet: 1 tab(s) orally 2 times a day (01 Dec 2023 11:42)  hydroCHLOROthiazide 25 mg oral tablet: 1 tab(s) orally once a day (01 Dec 2023 11:45)  metFORMIN 1000 mg oral tablet: 1 tab(s) orally 2 times a day (01 Dec 2023 11:45)  metoprolol succinate 200 mg oral capsule, extended release: 1 tab(s) orally once a day (01 Dec 2023 11:45)  NovoLOG Mix 70/30 FlexPen subcutaneous suspension: 33 unit(s) subcutaneous once a day (01 Dec 2023 11:43)  NovoLOG Mix 70/30 subcutaneous suspension: 22 unit(s) subcutaneous once a day (at bedtime) (01 Dec 2023 11:44)  pioglitazone 30 mg oral tablet: 1 tab(s) orally once a day (01 Dec 2023 11:45)  spironolactone 25 mg oral tablet: 1 orally 2 times a day (01 Dec 2023 11:45)  tamsulosin 0.4 mg oral capsule: 1 orally once a day (01 Dec 2023 11:45)      Allergies:  No Known Allergies      FAMILY HISTORY:      SOCIAL HISTORY:    CIGARETTES:  ALCOHOL:  MARIJUANA:  ILLICIT DRUGS:    PREVIOUS DIAGNOSTIC TESTING:      ECHO  FINDINGS:    STRESS  FINDINGS:    CATHETERIZATION  FINDINGS:    ELECTROPHYSIOLOGY STUDY  FINDINGS:    CAROTID ULTRASOUND:  FINDINGS    VENOUS DUPLEX SCAN:  FINDINGS:    CHEST CT PULMONARY ANGIO with IV Contrast:  FINDINGS:      MEDICATIONS  (STANDING):  albuterol/ipratropium for Nebulization 3 milliLiter(s) Nebulizer every 6 hours  aspirin  chewable 81 milliGRAM(s) Oral daily  atorvastatin 80 milliGRAM(s) Oral at bedtime  clopidogrel Tablet 75 milliGRAM(s) Oral daily  dextrose 5%. 1000 milliLiter(s) (50 mL/Hr) IV Continuous <Continuous>  dextrose 5%. 1000 milliLiter(s) (100 mL/Hr) IV Continuous <Continuous>  dextrose 50% Injectable 25 Gram(s) IV Push once  dextrose 50% Injectable 12.5 Gram(s) IV Push once  dextrose 50% Injectable 25 Gram(s) IV Push once  enalapril 20 milliGRAM(s) Oral daily  enoxaparin Injectable 40 milliGRAM(s) SubCutaneous every 24 hours  famotidine    Tablet 40 milliGRAM(s) Oral two times a day  furosemide   Injectable 40 milliGRAM(s) IV Push daily  gemfibrozil 600 milliGRAM(s) Oral two times a day  glucagon  Injectable 1 milliGRAM(s) IntraMuscular once  hydrochlorothiazide 25 milliGRAM(s) Oral daily  insulin glargine Injectable (LANTUS) 35 Unit(s) SubCutaneous at bedtime  insulin lispro (ADMELOG) corrective regimen sliding scale   SubCutaneous three times a day before meals  insulin lispro Injectable (ADMELOG) 7 Unit(s) SubCutaneous three times a day before meals  spironolactone 25 milliGRAM(s) Oral daily  tamsulosin 0.4 milliGRAM(s) Oral at bedtime    MEDICATIONS  (PRN):  dextrose Oral Gel 15 Gram(s) Oral once PRN Blood Glucose LESS THAN 70 milliGRAM(s)/deciliter      Vital Signs Last 24 Hrs  T(C): 35.9 (02 Dec 2023 13:07), Max: 36.4 (01 Dec 2023 16:03)  T(F): 96.7 (02 Dec 2023 13:07), Max: 97.5 (01 Dec 2023 16:03)  HR: 76 (02 Dec 2023 13:07) (76 - 92)  BP: 144/70 (02 Dec 2023 13:07) (126/60 - 145/66)  BP(mean): 98 (01 Dec 2023 16:03) (98 - 98)  RR: 18 (02 Dec 2023 13:45) (18 - 20)  SpO2: 100% (02 Dec 2023 13:45) (98% - 100%)    Parameters below as of 02 Dec 2023 13:45  Patient On (Oxygen Delivery Method): room air        PHYSICAL EXAM:    GENERAL: In no apparent distress, well nourished, and hydrated.  HEAD:  Atraumatic, Normocephalic  EYES: EOMI, PERRLA, conjunctiva and sclera clear  NECK: Supple and normal thyroid.  No JVD or carotid bruit.  Carotid pulse is 2+ bilaterally.  HEART: Regular rate and rhythm; No murmurs, rubs, or gallops.  PULMONARY: Clear to auscultation and perfusion.  No rales, wheezing, or rhonchi bilaterally.  ABDOMEN: Soft, Nontender, Nondistended; Bowel sounds present  EXTREMITIES:  2+ Peripheral Pulses, No clubbing, cyanosis, or edema  NEUROLOGICAL: Grossly nonfocal    I&O's Detail    01 Dec 2023 07:01  -  02 Dec 2023 07:00  --------------------------------------------------------  IN:    Oral Fluid: 390 mL  Total IN: 390 mL    OUT:  Total OUT: 0 mL    Total NET: 390 mL      02 Dec 2023 07:  -  02 Dec 2023 15:49  --------------------------------------------------------  IN:    Oral Fluid: 450 mL  Total IN: 450 mL    OUT:    Voided (mL): 3350 mL  Total OUT: 3350 mL    Total NET: -2900 mL        Daily Height in cm: 175.26 (01 Dec 2023 21:46)    Daily     INTERPRETATION OF TELEMETRY:    EC Lead ECG:   Ventricular Rate 89 BPM    Atrial Rate 89 BPM    P-R Interval 150 ms    QRS Duration 84 ms    Q-T Interval 346 ms    QTC Calculation(Bazett) 420 ms    P Axis 47 degrees    R Axis -6 degrees    T Axis 91 degrees    Diagnosis Line Normal sinus rhythm  Nonspecific ST and T wave abnormality  Abnormal ECG    Confirmed by XOCHITL ESPOSITO MD ([id number]) on 2023 7:12:38 AM (23 @ 04:11)        LABS:                        12.3   3.52  )-----------( 173      ( 02 Dec 2023 07:25 )             37.7         136  |  96<L>  |  12  ----------------------------<  175<H>  3.9   |  27  |  0.9    Ca    9.2      02 Dec 2023 07:25  Mg     1.9         TPro  6.6  /  Alb  4.0  /  TBili  0.6  /  DBili  x   /  AST  22  /  ALT  39  /  AlkPhos  67      CARDIAC MARKERS ( 01 Dec 2023 19:58 )  x     / <0.01 ng/mL / x     / x     / x      CARDIAC MARKERS ( 01 Dec 2023 16:16 )  x     / <0.01 ng/mL / x     / x     / x      CARDIAC MARKERS ( 01 Dec 2023 04:45 )  x     / <0.01 ng/mL / x     / x     / x            Urinalysis Basic - ( 02 Dec 2023 07:25 )    Color: x / Appearance: x / SG: x / pH: x  Gluc: 175 mg/dL / Ketone: x  / Bili: x / Urobili: x   Blood: x / Protein: x / Nitrite: x   Leuk Esterase: x / RBC: x / WBC x   Sq Epi: x / Non Sq Epi: x / Bacteria: x      BNP            RADIOLOGY & ADDITIONAL STUDIES:       no (get order) MD Tavares made aware/no (get order)

## 2023-12-02 NOTE — CONSULT NOTE ADULT - ASSESSMENT
57 yo M patient with a PMH of HTN, diabetes, BPH and CAD s/p PCI (last one on 3/2023), presents to the ED with dyspnea on exertion. EP was called due to sinus pause on telemetry at 1:48 am on 12/2/2023.   Patient denies h/o dizziness, presyncope, or syncope in the past. Patient reports sleeping at the time of the pause and does not recall anything unusual.    - 1 sinus pause overnight    Telemetry was reviewed. Heart rate noted to slow down at 1:48 am and 1 sinus pause 3.5 sec noted followed by normal sinus rhythm. No episodes during the day time. patient is asymptomatic. Episode is most likely caused by undiagnosed/untreated sleep apnea.   Please obtain TTE  Check TSH  Recommend pulmonary consult for sleep apnea evaluation as inpatient  Keep electrolytes K>4, Mg>2  Continue telemetry monitoring  Will sign off, please recall if needed

## 2023-12-02 NOTE — PROGRESS NOTE ADULT - ASSESSMENT
59 yo M PMHx HTN, DM II, BPH, and CAD s/p PCI, presented to the ED for evaluation of dyspnea on exertion. Patient endorses URI symptoms in last week. Patient endorsed worsening shortness of breath and persistent cough in the last few days. States he is more dyspneic with ambulation, and also more short of breath in the mornings when he first wakes up. Also endorses worsening LE edema recently.       In the ED, the patient was HD stable, afebrile, satting 88% on RA.  Labs were significant for mild transaminitis, a pro-BNP= 964; troponin negative   EKG showed NSR, with non- specific ST changes in the lateral leads  A CXR showed patchy R basilar opacity.  COVID Flu RSV negative     Patient admitted with acute hypoxic respiratory failure with suspected heart failure vs PNA.       Dyspnea on exertion  Acute Hypoxic Respiratory Failure   - suspected due to acute on chronic HFpEF  - pneumonia suspected on admission but absence of cough, fever, leukocytosis and procalcitonin 0.06 suggests agasint PNA  - In the ED, pulse oxygenation was 88%, increased to 97% on 3L NC, after receiving albuterol and IV lasix further suggesting chf exacerbation  - RVP negative  - d/c abx and monitor  - c/w IV diuresis  - echo pending  - . Likely falsely low due to obesity    Sinus pause  - as per pt he was diagnosed with SARIKA by his PMD. Never had formal sleep study test, does not use CPAP or bipap at home  - hold metoprolol  - will need outpt SARIKA testing  - EP eval for [facility]    HTN  - C/w enalapril, HCTZ,   - holdingToprol due to pause    DLD  - C/w atorvastatin, and gemfibrozil    BPH  - C/w tamsulosin    CAD s/ PCI  - C/w aspirin, Plavix, and atorvastatin 80     DM II  - c/w insulin  - FS controlled    DVT ppx: Lovenox 40 mg qday    #Progress Note Handoff:  Pending (specify):  echo, ep, diuresis  Family discussion: As above with patient  Disposition: Home_x__/SNF___/Other________/Unknown at this time________

## 2023-12-03 LAB
ALBUMIN SERPL ELPH-MCNC: 4.2 G/DL — SIGNIFICANT CHANGE UP (ref 3.5–5.2)
ALBUMIN SERPL ELPH-MCNC: 4.2 G/DL — SIGNIFICANT CHANGE UP (ref 3.5–5.2)
ALP SERPL-CCNC: 68 U/L — SIGNIFICANT CHANGE UP (ref 30–115)
ALP SERPL-CCNC: 68 U/L — SIGNIFICANT CHANGE UP (ref 30–115)
ALT FLD-CCNC: 36 U/L — SIGNIFICANT CHANGE UP (ref 0–41)
ALT FLD-CCNC: 36 U/L — SIGNIFICANT CHANGE UP (ref 0–41)
ANION GAP SERPL CALC-SCNC: 14 MMOL/L — SIGNIFICANT CHANGE UP (ref 7–14)
ANION GAP SERPL CALC-SCNC: 14 MMOL/L — SIGNIFICANT CHANGE UP (ref 7–14)
AST SERPL-CCNC: 21 U/L — SIGNIFICANT CHANGE UP (ref 0–41)
AST SERPL-CCNC: 21 U/L — SIGNIFICANT CHANGE UP (ref 0–41)
BASOPHILS # BLD AUTO: 0.02 K/UL — SIGNIFICANT CHANGE UP (ref 0–0.2)
BASOPHILS # BLD AUTO: 0.02 K/UL — SIGNIFICANT CHANGE UP (ref 0–0.2)
BASOPHILS NFR BLD AUTO: 0.6 % — SIGNIFICANT CHANGE UP (ref 0–1)
BASOPHILS NFR BLD AUTO: 0.6 % — SIGNIFICANT CHANGE UP (ref 0–1)
BILIRUB SERPL-MCNC: 0.6 MG/DL — SIGNIFICANT CHANGE UP (ref 0.2–1.2)
BILIRUB SERPL-MCNC: 0.6 MG/DL — SIGNIFICANT CHANGE UP (ref 0.2–1.2)
BUN SERPL-MCNC: 15 MG/DL — SIGNIFICANT CHANGE UP (ref 10–20)
BUN SERPL-MCNC: 15 MG/DL — SIGNIFICANT CHANGE UP (ref 10–20)
CALCIUM SERPL-MCNC: 9.3 MG/DL — SIGNIFICANT CHANGE UP (ref 8.4–10.4)
CALCIUM SERPL-MCNC: 9.3 MG/DL — SIGNIFICANT CHANGE UP (ref 8.4–10.4)
CHLORIDE SERPL-SCNC: 94 MMOL/L — LOW (ref 98–110)
CHLORIDE SERPL-SCNC: 94 MMOL/L — LOW (ref 98–110)
CO2 SERPL-SCNC: 24 MMOL/L — SIGNIFICANT CHANGE UP (ref 17–32)
CO2 SERPL-SCNC: 24 MMOL/L — SIGNIFICANT CHANGE UP (ref 17–32)
CREAT SERPL-MCNC: 0.9 MG/DL — SIGNIFICANT CHANGE UP (ref 0.7–1.5)
CREAT SERPL-MCNC: 0.9 MG/DL — SIGNIFICANT CHANGE UP (ref 0.7–1.5)
EGFR: 98 ML/MIN/1.73M2 — SIGNIFICANT CHANGE UP
EGFR: 98 ML/MIN/1.73M2 — SIGNIFICANT CHANGE UP
EOSINOPHIL # BLD AUTO: 0.16 K/UL — SIGNIFICANT CHANGE UP (ref 0–0.7)
EOSINOPHIL # BLD AUTO: 0.16 K/UL — SIGNIFICANT CHANGE UP (ref 0–0.7)
EOSINOPHIL NFR BLD AUTO: 4.5 % — SIGNIFICANT CHANGE UP (ref 0–8)
EOSINOPHIL NFR BLD AUTO: 4.5 % — SIGNIFICANT CHANGE UP (ref 0–8)
GLUCOSE BLDC GLUCOMTR-MCNC: 171 MG/DL — HIGH (ref 70–99)
GLUCOSE BLDC GLUCOMTR-MCNC: 171 MG/DL — HIGH (ref 70–99)
GLUCOSE BLDC GLUCOMTR-MCNC: 175 MG/DL — HIGH (ref 70–99)
GLUCOSE BLDC GLUCOMTR-MCNC: 175 MG/DL — HIGH (ref 70–99)
GLUCOSE BLDC GLUCOMTR-MCNC: 201 MG/DL — HIGH (ref 70–99)
GLUCOSE BLDC GLUCOMTR-MCNC: 201 MG/DL — HIGH (ref 70–99)
GLUCOSE BLDC GLUCOMTR-MCNC: 210 MG/DL — HIGH (ref 70–99)
GLUCOSE BLDC GLUCOMTR-MCNC: 210 MG/DL — HIGH (ref 70–99)
GLUCOSE SERPL-MCNC: 185 MG/DL — HIGH (ref 70–99)
GLUCOSE SERPL-MCNC: 185 MG/DL — HIGH (ref 70–99)
HCT VFR BLD CALC: 38.5 % — LOW (ref 42–52)
HCT VFR BLD CALC: 38.5 % — LOW (ref 42–52)
HGB BLD-MCNC: 12.6 G/DL — LOW (ref 14–18)
HGB BLD-MCNC: 12.6 G/DL — LOW (ref 14–18)
IMM GRANULOCYTES NFR BLD AUTO: 0.3 % — SIGNIFICANT CHANGE UP (ref 0.1–0.3)
IMM GRANULOCYTES NFR BLD AUTO: 0.3 % — SIGNIFICANT CHANGE UP (ref 0.1–0.3)
LYMPHOCYTES # BLD AUTO: 1.35 K/UL — SIGNIFICANT CHANGE UP (ref 1.2–3.4)
LYMPHOCYTES # BLD AUTO: 1.35 K/UL — SIGNIFICANT CHANGE UP (ref 1.2–3.4)
LYMPHOCYTES # BLD AUTO: 38 % — SIGNIFICANT CHANGE UP (ref 20.5–51.1)
LYMPHOCYTES # BLD AUTO: 38 % — SIGNIFICANT CHANGE UP (ref 20.5–51.1)
MAGNESIUM SERPL-MCNC: 1.8 MG/DL — SIGNIFICANT CHANGE UP (ref 1.8–2.4)
MAGNESIUM SERPL-MCNC: 1.8 MG/DL — SIGNIFICANT CHANGE UP (ref 1.8–2.4)
MCHC RBC-ENTMCNC: 27.2 PG — SIGNIFICANT CHANGE UP (ref 27–31)
MCHC RBC-ENTMCNC: 27.2 PG — SIGNIFICANT CHANGE UP (ref 27–31)
MCHC RBC-ENTMCNC: 32.7 G/DL — SIGNIFICANT CHANGE UP (ref 32–37)
MCHC RBC-ENTMCNC: 32.7 G/DL — SIGNIFICANT CHANGE UP (ref 32–37)
MCV RBC AUTO: 83.2 FL — SIGNIFICANT CHANGE UP (ref 80–94)
MCV RBC AUTO: 83.2 FL — SIGNIFICANT CHANGE UP (ref 80–94)
MONOCYTES # BLD AUTO: 0.41 K/UL — SIGNIFICANT CHANGE UP (ref 0.1–0.6)
MONOCYTES # BLD AUTO: 0.41 K/UL — SIGNIFICANT CHANGE UP (ref 0.1–0.6)
MONOCYTES NFR BLD AUTO: 11.5 % — HIGH (ref 1.7–9.3)
MONOCYTES NFR BLD AUTO: 11.5 % — HIGH (ref 1.7–9.3)
NEUTROPHILS # BLD AUTO: 1.6 K/UL — SIGNIFICANT CHANGE UP (ref 1.4–6.5)
NEUTROPHILS # BLD AUTO: 1.6 K/UL — SIGNIFICANT CHANGE UP (ref 1.4–6.5)
NEUTROPHILS NFR BLD AUTO: 45.1 % — SIGNIFICANT CHANGE UP (ref 42.2–75.2)
NEUTROPHILS NFR BLD AUTO: 45.1 % — SIGNIFICANT CHANGE UP (ref 42.2–75.2)
NRBC # BLD: 0 /100 WBCS — SIGNIFICANT CHANGE UP (ref 0–0)
NRBC # BLD: 0 /100 WBCS — SIGNIFICANT CHANGE UP (ref 0–0)
PLATELET # BLD AUTO: 209 K/UL — SIGNIFICANT CHANGE UP (ref 130–400)
PLATELET # BLD AUTO: 209 K/UL — SIGNIFICANT CHANGE UP (ref 130–400)
PMV BLD: 10.3 FL — SIGNIFICANT CHANGE UP (ref 7.4–10.4)
PMV BLD: 10.3 FL — SIGNIFICANT CHANGE UP (ref 7.4–10.4)
POTASSIUM SERPL-MCNC: 4.2 MMOL/L — SIGNIFICANT CHANGE UP (ref 3.5–5)
POTASSIUM SERPL-MCNC: 4.2 MMOL/L — SIGNIFICANT CHANGE UP (ref 3.5–5)
POTASSIUM SERPL-SCNC: 4.2 MMOL/L — SIGNIFICANT CHANGE UP (ref 3.5–5)
POTASSIUM SERPL-SCNC: 4.2 MMOL/L — SIGNIFICANT CHANGE UP (ref 3.5–5)
PROT SERPL-MCNC: 6.9 G/DL — SIGNIFICANT CHANGE UP (ref 6–8)
PROT SERPL-MCNC: 6.9 G/DL — SIGNIFICANT CHANGE UP (ref 6–8)
RBC # BLD: 4.63 M/UL — LOW (ref 4.7–6.1)
RBC # BLD: 4.63 M/UL — LOW (ref 4.7–6.1)
RBC # FLD: 15.3 % — HIGH (ref 11.5–14.5)
RBC # FLD: 15.3 % — HIGH (ref 11.5–14.5)
SODIUM SERPL-SCNC: 132 MMOL/L — LOW (ref 135–146)
SODIUM SERPL-SCNC: 132 MMOL/L — LOW (ref 135–146)
WBC # BLD: 3.55 K/UL — LOW (ref 4.8–10.8)
WBC # BLD: 3.55 K/UL — LOW (ref 4.8–10.8)
WBC # FLD AUTO: 3.55 K/UL — LOW (ref 4.8–10.8)
WBC # FLD AUTO: 3.55 K/UL — LOW (ref 4.8–10.8)

## 2023-12-03 PROCEDURE — 99233 SBSQ HOSP IP/OBS HIGH 50: CPT

## 2023-12-03 PROCEDURE — 93306 TTE W/DOPPLER COMPLETE: CPT | Mod: 26

## 2023-12-03 RX ADMIN — FAMOTIDINE 40 MILLIGRAM(S): 10 INJECTION INTRAVENOUS at 05:49

## 2023-12-03 RX ADMIN — Medication 600 MILLIGRAM(S): at 05:49

## 2023-12-03 RX ADMIN — Medication 2: at 12:08

## 2023-12-03 RX ADMIN — TAMSULOSIN HYDROCHLORIDE 0.4 MILLIGRAM(S): 0.4 CAPSULE ORAL at 21:42

## 2023-12-03 RX ADMIN — INSULIN GLARGINE 35 UNIT(S): 100 INJECTION, SOLUTION SUBCUTANEOUS at 21:42

## 2023-12-03 RX ADMIN — Medication 3 MILLILITER(S): at 08:27

## 2023-12-03 RX ADMIN — Medication 1: at 08:02

## 2023-12-03 RX ADMIN — Medication 7 UNIT(S): at 08:02

## 2023-12-03 RX ADMIN — SPIRONOLACTONE 25 MILLIGRAM(S): 25 TABLET, FILM COATED ORAL at 05:49

## 2023-12-03 RX ADMIN — CLOPIDOGREL BISULFATE 75 MILLIGRAM(S): 75 TABLET, FILM COATED ORAL at 11:31

## 2023-12-03 RX ADMIN — Medication 81 MILLIGRAM(S): at 11:31

## 2023-12-03 RX ADMIN — FAMOTIDINE 40 MILLIGRAM(S): 10 INJECTION INTRAVENOUS at 17:28

## 2023-12-03 RX ADMIN — Medication 1: at 16:54

## 2023-12-03 RX ADMIN — ENOXAPARIN SODIUM 40 MILLIGRAM(S): 100 INJECTION SUBCUTANEOUS at 05:49

## 2023-12-03 RX ADMIN — Medication 40 MILLIGRAM(S): at 05:49

## 2023-12-03 RX ADMIN — Medication 7 UNIT(S): at 16:54

## 2023-12-03 RX ADMIN — Medication 7 UNIT(S): at 12:08

## 2023-12-03 RX ADMIN — Medication 600 MILLIGRAM(S): at 17:28

## 2023-12-03 RX ADMIN — ATORVASTATIN CALCIUM 80 MILLIGRAM(S): 80 TABLET, FILM COATED ORAL at 21:42

## 2023-12-03 NOTE — PROGRESS NOTE ADULT - ASSESSMENT
59 yo M PMHx HTN, DM II, BPH, and CAD s/p PCI, presented to the ED for evaluation of dyspnea on exertion. Patient endorses URI symptoms in last week. Patient endorsed worsening shortness of breath and persistent cough in the last few days. States he is more dyspneic with ambulation, and also more short of breath in the mornings when he first wakes up. Also endorses worsening LE edema recently.       In the ED, the patient was HD stable, afebrile, satting 88% on RA.  Labs were significant for mild transaminitis, a pro-BNP= 964; troponin negative   EKG showed NSR, with non- specific ST changes in the lateral leads  A CXR showed patchy R basilar opacity.  COVID Flu RSV negative     Patient admitted with acute hypoxic respiratory failure with suspected heart failure vs PNA.       Dyspnea on exertion  Acute Hypoxic Respiratory Failure   - suspected due to acute on chronic HFpEF  - pneumonia suspected on admission but absence of cough, fever, leukocytosis and procalcitonin 0.06 suggests agasint PNA  - In the ED, pulse oxygenation was 88%, increased to 97% on 3L NC, after receiving albuterol and IV lasix further suggesting chf exacerbation  - RVP negative  - continue off abx and monitor  - c/w IV diuresis  - echo pending  - . Likely falsely low due to obesity  - pt net negative 5L yesterday    New murmur  - pending echo  - need to ensure no valvulapthy/infection leading to pause    Sinus pause  - as per pt he was diagnosed with SARIKA by his PMD. Never had formal sleep study test, does not use CPAP or bipap at home  - hold metoprolol  - vbg CO2 51--will likely qualify for bipap-  - awiaitn pulm eval for bipap recs    HTN  - C/w enalapril, HCTZ,   - holdingToprol due to pause    DLD  - C/w atorvastatin, and gemfibrozil    BPH  - C/w tamsulosin    CAD s/ PCI  - C/w aspirin, Plavix, and atorvastatin 80     DM II  - c/w insulin  - FS controlled    DVT ppx: Lovenox 40 mg qday    #Progress Note Handoff:  Pending (specify):  echo, pulm, diuresis  Family discussion: As above with patient  Disposition: Home_x__/SNF___/Other________/Unknown at this time________   57 yo M PMHx HTN, DM II, BPH, and CAD s/p PCI, presented to the ED for evaluation of dyspnea on exertion. Patient endorses URI symptoms in last week. Patient endorsed worsening shortness of breath and persistent cough in the last few days. States he is more dyspneic with ambulation, and also more short of breath in the mornings when he first wakes up. Also endorses worsening LE edema recently.       In the ED, the patient was HD stable, afebrile, satting 88% on RA.  Labs were significant for mild transaminitis, a pro-BNP= 964; troponin negative   EKG showed NSR, with non- specific ST changes in the lateral leads  A CXR showed patchy R basilar opacity.  COVID Flu RSV negative     Patient admitted with acute hypoxic respiratory failure with suspected heart failure vs PNA.       Dyspnea on exertion  Acute Hypoxic Respiratory Failure   - suspected due to acute on chronic HFpEF  - pneumonia suspected on admission but absence of cough, fever, leukocytosis and procalcitonin 0.06 suggests agasint PNA  - In the ED, pulse oxygenation was 88%, increased to 97% on 3L NC, after receiving albuterol and IV lasix further suggesting chf exacerbation  - RVP negative  - continue off abx and monitor  - c/w IV diuresis  - echo pending  - . Likely falsely low due to obesity  - pt net negative 5L yesterday    New murmur  - pending echo  - need to ensure no valvulapthy/infection leading to pause    Sinus pause  - as per pt he was diagnosed with SARIKA by his PMD. Never had formal sleep study test, does not use CPAP or bipap at home  - hold metoprolol  - vbg CO2 51--will likely qualify for bipap-  - awiaitn pulm eval for bipap recs    HTN  - C/w enalapril, HCTZ,   - holdingToprol due to pause    DLD  - C/w atorvastatin, and gemfibrozil    BPH  - C/w tamsulosin    CAD s/ PCI  - C/w aspirin, Plavix, and atorvastatin 80     DM II  - c/w insulin  - FS controlled    DVT ppx: Lovenox 40 mg qday    #Progress Note Handoff:  Pending (specify):  echo, pulm, diuresis  Family discussion: As above with patient  Disposition: Home_x__/SNF___/Other________/Unknown at this time________

## 2023-12-04 ENCOUNTER — TRANSCRIPTION ENCOUNTER (OUTPATIENT)
Age: 58
End: 2023-12-04

## 2023-12-04 LAB
ALBUMIN SERPL ELPH-MCNC: 4.2 G/DL — SIGNIFICANT CHANGE UP (ref 3.5–5.2)
ALBUMIN SERPL ELPH-MCNC: 4.2 G/DL — SIGNIFICANT CHANGE UP (ref 3.5–5.2)
ALP SERPL-CCNC: 68 U/L — SIGNIFICANT CHANGE UP (ref 30–115)
ALP SERPL-CCNC: 68 U/L — SIGNIFICANT CHANGE UP (ref 30–115)
ALT FLD-CCNC: 32 U/L — SIGNIFICANT CHANGE UP (ref 0–41)
ALT FLD-CCNC: 32 U/L — SIGNIFICANT CHANGE UP (ref 0–41)
ANION GAP SERPL CALC-SCNC: 12 MMOL/L — SIGNIFICANT CHANGE UP (ref 7–14)
ANION GAP SERPL CALC-SCNC: 12 MMOL/L — SIGNIFICANT CHANGE UP (ref 7–14)
AST SERPL-CCNC: 21 U/L — SIGNIFICANT CHANGE UP (ref 0–41)
AST SERPL-CCNC: 21 U/L — SIGNIFICANT CHANGE UP (ref 0–41)
BASOPHILS # BLD AUTO: 0.02 K/UL — SIGNIFICANT CHANGE UP (ref 0–0.2)
BASOPHILS # BLD AUTO: 0.02 K/UL — SIGNIFICANT CHANGE UP (ref 0–0.2)
BASOPHILS NFR BLD AUTO: 0.5 % — SIGNIFICANT CHANGE UP (ref 0–1)
BASOPHILS NFR BLD AUTO: 0.5 % — SIGNIFICANT CHANGE UP (ref 0–1)
BILIRUB SERPL-MCNC: 0.4 MG/DL — SIGNIFICANT CHANGE UP (ref 0.2–1.2)
BILIRUB SERPL-MCNC: 0.4 MG/DL — SIGNIFICANT CHANGE UP (ref 0.2–1.2)
BUN SERPL-MCNC: 20 MG/DL — SIGNIFICANT CHANGE UP (ref 10–20)
BUN SERPL-MCNC: 20 MG/DL — SIGNIFICANT CHANGE UP (ref 10–20)
CALCIUM SERPL-MCNC: 9.2 MG/DL — SIGNIFICANT CHANGE UP (ref 8.4–10.4)
CALCIUM SERPL-MCNC: 9.2 MG/DL — SIGNIFICANT CHANGE UP (ref 8.4–10.4)
CHLORIDE SERPL-SCNC: 97 MMOL/L — LOW (ref 98–110)
CHLORIDE SERPL-SCNC: 97 MMOL/L — LOW (ref 98–110)
CO2 SERPL-SCNC: 23 MMOL/L — SIGNIFICANT CHANGE UP (ref 17–32)
CO2 SERPL-SCNC: 23 MMOL/L — SIGNIFICANT CHANGE UP (ref 17–32)
CREAT SERPL-MCNC: 0.9 MG/DL — SIGNIFICANT CHANGE UP (ref 0.7–1.5)
CREAT SERPL-MCNC: 0.9 MG/DL — SIGNIFICANT CHANGE UP (ref 0.7–1.5)
EGFR: 98 ML/MIN/1.73M2 — SIGNIFICANT CHANGE UP
EGFR: 98 ML/MIN/1.73M2 — SIGNIFICANT CHANGE UP
EOSINOPHIL # BLD AUTO: 0.25 K/UL — SIGNIFICANT CHANGE UP (ref 0–0.7)
EOSINOPHIL # BLD AUTO: 0.25 K/UL — SIGNIFICANT CHANGE UP (ref 0–0.7)
EOSINOPHIL NFR BLD AUTO: 5.8 % — SIGNIFICANT CHANGE UP (ref 0–8)
EOSINOPHIL NFR BLD AUTO: 5.8 % — SIGNIFICANT CHANGE UP (ref 0–8)
GLUCOSE BLDC GLUCOMTR-MCNC: 222 MG/DL — HIGH (ref 70–99)
GLUCOSE BLDC GLUCOMTR-MCNC: 222 MG/DL — HIGH (ref 70–99)
GLUCOSE BLDC GLUCOMTR-MCNC: 224 MG/DL — HIGH (ref 70–99)
GLUCOSE BLDC GLUCOMTR-MCNC: 224 MG/DL — HIGH (ref 70–99)
GLUCOSE BLDC GLUCOMTR-MCNC: 238 MG/DL — HIGH (ref 70–99)
GLUCOSE BLDC GLUCOMTR-MCNC: 238 MG/DL — HIGH (ref 70–99)
GLUCOSE BLDC GLUCOMTR-MCNC: 243 MG/DL — HIGH (ref 70–99)
GLUCOSE BLDC GLUCOMTR-MCNC: 243 MG/DL — HIGH (ref 70–99)
GLUCOSE SERPL-MCNC: 194 MG/DL — HIGH (ref 70–99)
GLUCOSE SERPL-MCNC: 194 MG/DL — HIGH (ref 70–99)
HCT VFR BLD CALC: 38.8 % — LOW (ref 42–52)
HCT VFR BLD CALC: 38.8 % — LOW (ref 42–52)
HGB BLD-MCNC: 12.5 G/DL — LOW (ref 14–18)
HGB BLD-MCNC: 12.5 G/DL — LOW (ref 14–18)
IMM GRANULOCYTES NFR BLD AUTO: 0.2 % — SIGNIFICANT CHANGE UP (ref 0.1–0.3)
IMM GRANULOCYTES NFR BLD AUTO: 0.2 % — SIGNIFICANT CHANGE UP (ref 0.1–0.3)
LYMPHOCYTES # BLD AUTO: 1.43 K/UL — SIGNIFICANT CHANGE UP (ref 1.2–3.4)
LYMPHOCYTES # BLD AUTO: 1.43 K/UL — SIGNIFICANT CHANGE UP (ref 1.2–3.4)
LYMPHOCYTES # BLD AUTO: 33 % — SIGNIFICANT CHANGE UP (ref 20.5–51.1)
LYMPHOCYTES # BLD AUTO: 33 % — SIGNIFICANT CHANGE UP (ref 20.5–51.1)
MAGNESIUM SERPL-MCNC: 2 MG/DL — SIGNIFICANT CHANGE UP (ref 1.8–2.4)
MAGNESIUM SERPL-MCNC: 2 MG/DL — SIGNIFICANT CHANGE UP (ref 1.8–2.4)
MCHC RBC-ENTMCNC: 26.9 PG — LOW (ref 27–31)
MCHC RBC-ENTMCNC: 26.9 PG — LOW (ref 27–31)
MCHC RBC-ENTMCNC: 32.2 G/DL — SIGNIFICANT CHANGE UP (ref 32–37)
MCHC RBC-ENTMCNC: 32.2 G/DL — SIGNIFICANT CHANGE UP (ref 32–37)
MCV RBC AUTO: 83.6 FL — SIGNIFICANT CHANGE UP (ref 80–94)
MCV RBC AUTO: 83.6 FL — SIGNIFICANT CHANGE UP (ref 80–94)
MONOCYTES # BLD AUTO: 0.55 K/UL — SIGNIFICANT CHANGE UP (ref 0.1–0.6)
MONOCYTES # BLD AUTO: 0.55 K/UL — SIGNIFICANT CHANGE UP (ref 0.1–0.6)
MONOCYTES NFR BLD AUTO: 12.7 % — HIGH (ref 1.7–9.3)
MONOCYTES NFR BLD AUTO: 12.7 % — HIGH (ref 1.7–9.3)
NEUTROPHILS # BLD AUTO: 2.07 K/UL — SIGNIFICANT CHANGE UP (ref 1.4–6.5)
NEUTROPHILS # BLD AUTO: 2.07 K/UL — SIGNIFICANT CHANGE UP (ref 1.4–6.5)
NEUTROPHILS NFR BLD AUTO: 47.8 % — SIGNIFICANT CHANGE UP (ref 42.2–75.2)
NEUTROPHILS NFR BLD AUTO: 47.8 % — SIGNIFICANT CHANGE UP (ref 42.2–75.2)
NRBC # BLD: 0 /100 WBCS — SIGNIFICANT CHANGE UP (ref 0–0)
NRBC # BLD: 0 /100 WBCS — SIGNIFICANT CHANGE UP (ref 0–0)
PLATELET # BLD AUTO: 214 K/UL — SIGNIFICANT CHANGE UP (ref 130–400)
PLATELET # BLD AUTO: 214 K/UL — SIGNIFICANT CHANGE UP (ref 130–400)
PMV BLD: 10.5 FL — HIGH (ref 7.4–10.4)
PMV BLD: 10.5 FL — HIGH (ref 7.4–10.4)
POTASSIUM SERPL-MCNC: 4.3 MMOL/L — SIGNIFICANT CHANGE UP (ref 3.5–5)
POTASSIUM SERPL-MCNC: 4.3 MMOL/L — SIGNIFICANT CHANGE UP (ref 3.5–5)
POTASSIUM SERPL-SCNC: 4.3 MMOL/L — SIGNIFICANT CHANGE UP (ref 3.5–5)
POTASSIUM SERPL-SCNC: 4.3 MMOL/L — SIGNIFICANT CHANGE UP (ref 3.5–5)
PROT SERPL-MCNC: 6.9 G/DL — SIGNIFICANT CHANGE UP (ref 6–8)
PROT SERPL-MCNC: 6.9 G/DL — SIGNIFICANT CHANGE UP (ref 6–8)
RBC # BLD: 4.64 M/UL — LOW (ref 4.7–6.1)
RBC # BLD: 4.64 M/UL — LOW (ref 4.7–6.1)
RBC # FLD: 15.1 % — HIGH (ref 11.5–14.5)
RBC # FLD: 15.1 % — HIGH (ref 11.5–14.5)
SODIUM SERPL-SCNC: 132 MMOL/L — LOW (ref 135–146)
SODIUM SERPL-SCNC: 132 MMOL/L — LOW (ref 135–146)
WBC # BLD: 4.33 K/UL — LOW (ref 4.8–10.8)
WBC # BLD: 4.33 K/UL — LOW (ref 4.8–10.8)
WBC # FLD AUTO: 4.33 K/UL — LOW (ref 4.8–10.8)
WBC # FLD AUTO: 4.33 K/UL — LOW (ref 4.8–10.8)

## 2023-12-04 PROCEDURE — 99233 SBSQ HOSP IP/OBS HIGH 50: CPT

## 2023-12-04 PROCEDURE — 93010 ELECTROCARDIOGRAM REPORT: CPT

## 2023-12-04 PROCEDURE — 71045 X-RAY EXAM CHEST 1 VIEW: CPT | Mod: 26

## 2023-12-04 RX ORDER — METOPROLOL TARTRATE 50 MG
25 TABLET ORAL
Refills: 0 | Status: DISCONTINUED | OUTPATIENT
Start: 2023-12-04 | End: 2023-12-06

## 2023-12-04 RX ORDER — APIXABAN 2.5 MG/1
5 TABLET, FILM COATED ORAL EVERY 12 HOURS
Refills: 0 | Status: DISCONTINUED | OUTPATIENT
Start: 2023-12-04 | End: 2023-12-07

## 2023-12-04 RX ADMIN — ENOXAPARIN SODIUM 40 MILLIGRAM(S): 100 INJECTION SUBCUTANEOUS at 05:28

## 2023-12-04 RX ADMIN — Medication 81 MILLIGRAM(S): at 11:38

## 2023-12-04 RX ADMIN — INSULIN GLARGINE 35 UNIT(S): 100 INJECTION, SOLUTION SUBCUTANEOUS at 21:46

## 2023-12-04 RX ADMIN — FAMOTIDINE 40 MILLIGRAM(S): 10 INJECTION INTRAVENOUS at 05:28

## 2023-12-04 RX ADMIN — SPIRONOLACTONE 25 MILLIGRAM(S): 25 TABLET, FILM COATED ORAL at 05:28

## 2023-12-04 RX ADMIN — Medication 2: at 11:47

## 2023-12-04 RX ADMIN — FAMOTIDINE 40 MILLIGRAM(S): 10 INJECTION INTRAVENOUS at 17:51

## 2023-12-04 RX ADMIN — Medication 40 MILLIGRAM(S): at 05:28

## 2023-12-04 RX ADMIN — APIXABAN 5 MILLIGRAM(S): 2.5 TABLET, FILM COATED ORAL at 17:16

## 2023-12-04 RX ADMIN — Medication 3 MILLILITER(S): at 14:01

## 2023-12-04 RX ADMIN — TAMSULOSIN HYDROCHLORIDE 0.4 MILLIGRAM(S): 0.4 CAPSULE ORAL at 21:46

## 2023-12-04 RX ADMIN — ATORVASTATIN CALCIUM 80 MILLIGRAM(S): 80 TABLET, FILM COATED ORAL at 21:46

## 2023-12-04 RX ADMIN — Medication 7 UNIT(S): at 16:35

## 2023-12-04 RX ADMIN — Medication 600 MILLIGRAM(S): at 05:29

## 2023-12-04 RX ADMIN — Medication 2: at 08:20

## 2023-12-04 RX ADMIN — Medication 600 MILLIGRAM(S): at 17:17

## 2023-12-04 RX ADMIN — CLOPIDOGREL BISULFATE 75 MILLIGRAM(S): 75 TABLET, FILM COATED ORAL at 11:38

## 2023-12-04 RX ADMIN — Medication 7 UNIT(S): at 08:20

## 2023-12-04 RX ADMIN — Medication 25 MILLIGRAM(S): at 17:16

## 2023-12-04 RX ADMIN — Medication 7 UNIT(S): at 11:49

## 2023-12-04 RX ADMIN — Medication 3 MILLILITER(S): at 20:08

## 2023-12-04 RX ADMIN — Medication 2: at 16:34

## 2023-12-04 RX ADMIN — Medication 20 MILLIGRAM(S): at 05:28

## 2023-12-04 NOTE — DISCHARGE NOTE NURSING/CASE MANAGEMENT/SOCIAL WORK - NSDCPEFALRISK_GEN_ALL_CORE
For information on Fall & Injury Prevention, visit: [email or web address] OR  [email or web address] OR  https://www.cdc.gov/steadi/patient.html For information on Fall & Injury Prevention, visit: [email or web address] OR  [email or web address] OR  https://www.cdc.gov/steadi/patient.html

## 2023-12-04 NOTE — CONSULT NOTE ADULT - ATTENDING COMMENTS
Events noted, on RA feels better, typical symptoms of SARIKA, OP sleep study, PFT. Chest ct screening smoking counseling, weight loss

## 2023-12-04 NOTE — CONSULT NOTE ADULT - SUBJECTIVE AND OBJECTIVE BOX
Patient is a 58y old  Male who presents with a chief complaint of Shortness of breath (03 Dec 2023 16:18)      HPI:  57 yo M patient with a PMH of HTN, diabetes, BPH and CAD s/p PCI, presents to the ED with dyspnea on exertion.    Patient reports that over the past week he has been having upper URI, with persistent cough, productive with scant sputum production.  Patient has been also getting more dyspneic on ambulation, partially relieved on rest.  No chest pain fevers/ chills, abdominal pain, nausea/ vomiting, changes in bowel/ urinary habits.    In the ED, the patient was HD stable, afebrile, satting 88% on RA.  Labs were significant for mild transaminitis, a pro-BNP= 964.  VBG showed: pH= 7.38/51/43/30/63.5%.  EKG showed NSR, with non- specific ST changes in the lateral leads, a RVP was negative.  A CXR showed patchy opacity over the right lower lobe with possible volume overload.    Patient received in the ED IV lasix and albuterol, with symptomatic relief after, and he will be admitted for management. (01 Dec 2023 11:45)      PAST MEDICAL & SURGICAL HISTORY:  CAD S/P percutaneous coronary angioplasty      DM (diabetes mellitus)      Hyperlipidemia      HTN (hypertension)          SOCIAL HX:   Smoking                         ETOH                            Other    FAMILY HISTORY:  .  No cardiovascular or pulmonary family history     REVIEW OF SYSTEMS:    All ROS are negative exept per HPI       Allergies    No Known Allergies    Intolerances          PHYSICAL EXAM  Vital Signs Last 24 Hrs  T(C): 36.2 (03 Dec 2023 20:59), Max: 36.2 (03 Dec 2023 20:59)  T(F): 97.2 (03 Dec 2023 20:59), Max: 97.2 (03 Dec 2023 20:59)  HR: 84 (04 Dec 2023 05:25) (73 - 85)  BP: 165/77 (04 Dec 2023 05:25) (136/61 - 165/77)  BP(mean): 100 (03 Dec 2023 16:37) (96 - 100)  RR: 18 (04 Dec 2023 08:16) (18 - 18)  SpO2: 99% (04 Dec 2023 08:16) (99% - 99%)    Parameters below as of 04 Dec 2023 08:16  Patient On (Oxygen Delivery Method): room air        CONSTITUTIONAL:  Well nourished.  NAD    ENT:   Airway patent,   No thrush    EYES:   Clear bilaterally,   pupils equal,   round and reactive to light.    CARDIAC:   Normal rate,   regular rhythm.    no edema      RESPIRATORY:   No wheezing   Normal chest expansion  Not tachypneic,  No use of accessory muscles    GASTROINTESTINAL:  Abdomen soft, non-tender,   No guarding,   Positive BS    MUSCULOSKELETAL:   Range of motion is not limited,  No clubbing, cyanosis    NEUROLOGICAL:   Alert and oriented   No motor deficits.    SKIN:   Skin normal color for race,   No evidence of rash.      HEME LYMPH:   No cervical  lymphadenopathy.  no inguinal lymphadenopathy          LABS:                          12.5   4.33  )-----------( 214      ( 04 Dec 2023 05:02 )             38.8                                               12-04    132<L>  |  97<L>  |  20  ----------------------------<  194<H>  4.3   |  23  |  0.9    Ca    9.2      04 Dec 2023 05:02  Mg     2.0     12-04    TPro  6.9  /  Alb  4.2  /  TBili  0.4  /  DBili  x   /  AST  21  /  ALT  32  /  AlkPhos  68  12-04                                             Urinalysis Basic - ( 04 Dec 2023 05:02 )    Color: x / Appearance: x / SG: x / pH: x  Gluc: 194 mg/dL / Ketone: x  / Bili: x / Urobili: x   Blood: x / Protein: x / Nitrite: x   Leuk Esterase: x / RBC: x / WBC x   Sq Epi: x / Non Sq Epi: x / Bacteria: x                                                  LIVER FUNCTIONS - ( 04 Dec 2023 05:02 )  Alb: 4.2 g/dL / Pro: 6.9 g/dL / ALK PHOS: 68 U/L / ALT: 32 U/L / AST: 21 U/L / GGT: x                                                                                                MEDICATIONS  (STANDING):  albuterol/ipratropium for Nebulization 3 milliLiter(s) Nebulizer every 6 hours  aspirin  chewable 81 milliGRAM(s) Oral daily  atorvastatin 80 milliGRAM(s) Oral at bedtime  clopidogrel Tablet 75 milliGRAM(s) Oral daily  dextrose 5%. 1000 milliLiter(s) (100 mL/Hr) IV Continuous <Continuous>  dextrose 5%. 1000 milliLiter(s) (50 mL/Hr) IV Continuous <Continuous>  dextrose 50% Injectable 25 Gram(s) IV Push once  dextrose 50% Injectable 25 Gram(s) IV Push once  dextrose 50% Injectable 12.5 Gram(s) IV Push once  enalapril 20 milliGRAM(s) Oral daily  enoxaparin Injectable 40 milliGRAM(s) SubCutaneous every 24 hours  famotidine    Tablet 40 milliGRAM(s) Oral two times a day  furosemide   Injectable 40 milliGRAM(s) IV Push daily  gemfibrozil 600 milliGRAM(s) Oral two times a day  glucagon  Injectable 1 milliGRAM(s) IntraMuscular once  hydrochlorothiazide 25 milliGRAM(s) Oral daily  insulin glargine Injectable (LANTUS) 35 Unit(s) SubCutaneous at bedtime  insulin lispro (ADMELOG) corrective regimen sliding scale   SubCutaneous three times a day before meals  insulin lispro Injectable (ADMELOG) 7 Unit(s) SubCutaneous three times a day before meals  spironolactone 25 milliGRAM(s) Oral daily  tamsulosin 0.4 milliGRAM(s) Oral at bedtime    MEDICATIONS  (PRN):  dextrose Oral Gel 15 Gram(s) Oral once PRN Blood Glucose LESS THAN 70 milliGRAM(s)/deciliter      X-Rays reviewed:    CXR interpreted by me: Patient is a 58y old  Male who presents with a chief complaint of Shortness of breath (03 Dec 2023 16:18)      HPI:  57 yo M patient with a PMH of HTN, diabetes, BPH and CAD s/p PCI, presents to the ED with dyspnea on exertion.    Patient reports that over the past week he has been having upper URI, with persistent cough, productive with scant sputum production.  Patient has been also getting more dyspneic on ambulation, partially relieved on rest.  No chest pain fevers/ chills, abdominal pain, nausea/ vomiting, changes in bowel/ urinary habits.    In the ED, the patient was HD stable, afebrile, satting 88% on RA.  Labs were significant for mild transaminitis, a pro-BNP= 964.  VBG showed: pH= 7.38/51/43/30/63.5%.  EKG showed NSR, with non- specific ST changes in the lateral leads, a RVP was negative.  A CXR showed patchy opacity over the right lower lobe with possible volume overload.    Patient received in the ED IV lasix and albuterol, with symptomatic relief after, and he will be admitted for management. (01 Dec 2023 11:45)      PAST MEDICAL & SURGICAL HISTORY:  CAD S/P percutaneous coronary angioplasty      DM (diabetes mellitus)      Hyperlipidemia      HTN (hypertension)          SOCIAL HX:   Smoking     current 20+ pack/year               ETOH                            Other    FAMILY HISTORY:  .  No cardiovascular or pulmonary family history     REVIEW OF SYSTEMS:    All ROS are negative exept per HPI       Allergies    No Known Allergies    Intolerances      PHYSICAL EXAM  Vital Signs Last 24 Hrs  T(C): 36.2 (03 Dec 2023 20:59), Max: 36.2 (03 Dec 2023 20:59)  T(F): 97.2 (03 Dec 2023 20:59), Max: 97.2 (03 Dec 2023 20:59)  HR: 84 (04 Dec 2023 05:25) (73 - 85)  BP: 165/77 (04 Dec 2023 05:25) (136/61 - 165/77)  BP(mean): 100 (03 Dec 2023 16:37) (96 - 100)  RR: 18 (04 Dec 2023 08:16) (18 - 18)  SpO2: 99% (04 Dec 2023 08:16) (99% - 99%)    Parameters below as of 04 Dec 2023 08:16  Patient On (Oxygen Delivery Method): room air      CONSTITUTIONAL:  NAD    CARDIAC:   Normal rate,   regular rhythm.    no edema    RESPIRATORY:   decreased BS BL    GASTROINTESTINAL:  Abdomen soft, non-tender,   No guarding,     MUSCULOSKELETAL:   Range of motion is not limited,  No clubbing, cyanosis    NEUROLOGICAL:   Alert and oriented   No motor deficits.    LABS:                          12.5   4.33  )-----------( 214      ( 04 Dec 2023 05:02 )             38.8                                               12-04    132<L>  |  97<L>  |  20  ----------------------------<  194<H>  4.3   |  23  |  0.9    Ca    9.2      04 Dec 2023 05:02  Mg     2.0     12-04    TPro  6.9  /  Alb  4.2  /  TBili  0.4  /  DBili  x   /  AST  21  /  ALT  32  /  AlkPhos  68  12-04                                             Urinalysis Basic - ( 04 Dec 2023 05:02 )    Color: x / Appearance: x / SG: x / pH: x  Gluc: 194 mg/dL / Ketone: x  / Bili: x / Urobili: x   Blood: x / Protein: x / Nitrite: x   Leuk Esterase: x / RBC: x / WBC x   Sq Epi: x / Non Sq Epi: x / Bacteria: x                                                  LIVER FUNCTIONS - ( 04 Dec 2023 05:02 )  Alb: 4.2 g/dL / Pro: 6.9 g/dL / ALK PHOS: 68 U/L / ALT: 32 U/L / AST: 21 U/L / GGT: x                                                                                                MEDICATIONS  (STANDING):  albuterol/ipratropium for Nebulization 3 milliLiter(s) Nebulizer every 6 hours  aspirin  chewable 81 milliGRAM(s) Oral daily  atorvastatin 80 milliGRAM(s) Oral at bedtime  clopidogrel Tablet 75 milliGRAM(s) Oral daily  dextrose 5%. 1000 milliLiter(s) (100 mL/Hr) IV Continuous <Continuous>  dextrose 5%. 1000 milliLiter(s) (50 mL/Hr) IV Continuous <Continuous>  dextrose 50% Injectable 25 Gram(s) IV Push once  dextrose 50% Injectable 25 Gram(s) IV Push once  dextrose 50% Injectable 12.5 Gram(s) IV Push once  enalapril 20 milliGRAM(s) Oral daily  enoxaparin Injectable 40 milliGRAM(s) SubCutaneous every 24 hours  famotidine    Tablet 40 milliGRAM(s) Oral two times a day  furosemide   Injectable 40 milliGRAM(s) IV Push daily  gemfibrozil 600 milliGRAM(s) Oral two times a day  glucagon  Injectable 1 milliGRAM(s) IntraMuscular once  hydrochlorothiazide 25 milliGRAM(s) Oral daily  insulin glargine Injectable (LANTUS) 35 Unit(s) SubCutaneous at bedtime  insulin lispro (ADMELOG) corrective regimen sliding scale   SubCutaneous three times a day before meals  insulin lispro Injectable (ADMELOG) 7 Unit(s) SubCutaneous three times a day before meals  spironolactone 25 milliGRAM(s) Oral daily  tamsulosin 0.4 milliGRAM(s) Oral at bedtime    MEDICATIONS  (PRN):  dextrose Oral Gel 15 Gram(s) Oral once PRN Blood Glucose LESS THAN 70 milliGRAM(s)/deciliter      X-Rays reviewed: Patient is a 58y old  Male who presents with a chief complaint of Shortness of breath (03 Dec 2023 16:18)      HPI:  59 yo M patient with a PMH of HTN, diabetes, BPH and CAD s/p PCI, presents to the ED with dyspnea on exertion. Patient reports that over the past week he has been having upper URI, with persistent cough, productive with scant sputum production.  Patient has been also getting more dyspneic on ambulation, partially relieved on rest.  No chest pain fevers/ chills, abdominal pain, nausea/ vomiting, changes in bowel/ urinary habits.    In the ED, the patient was HD stable, afebrile, satting 88% on RA.  Labs were significant for mild transaminitis, a pro-BNP= 964.  VBG showed: pH= 7.38/51/43/30/63.5%.  EKG showed NSR, with non- specific ST changes in the lateral leads, a RVP was negative.  A CXR showed patchy opacity over the right lower lobe with possible volume overload.    Patient received in the ED IV lasix and albuterol, with symptomatic relief after, was admitted to tele, seen by EPS, sinus pause, reports typical symptoms of SARIKA, snoring/ EDS, all over feels better walking around the floor      PAST MEDICAL & SURGICAL HISTORY:  CAD S/P percutaneous coronary angioplasty      DM (diabetes mellitus)      Hyperlipidemia      HTN (hypertension)          SOCIAL HX:   Smoking     current 20+ pack/year         FAMILY HISTORY:  .  No cardiovascular or pulmonary family history     REVIEW OF SYSTEMS:    All ROS are negative exept per HPI       Allergies    No Known Allergies    Intolerances      PHYSICAL EXAM  Vital Signs Last 24 Hrs  T(C): 36.2 (03 Dec 2023 20:59), Max: 36.2 (03 Dec 2023 20:59)  T(F): 97.2 (03 Dec 2023 20:59), Max: 97.2 (03 Dec 2023 20:59)  HR: 84 (04 Dec 2023 05:25) (73 - 85)  BP: 165/77 (04 Dec 2023 05:25) (136/61 - 165/77)  BP(mean): 100 (03 Dec 2023 16:37) (96 - 100)  RR: 18 (04 Dec 2023 08:16) (18 - 18)  SpO2: 99% (04 Dec 2023 08:16) (99% - 99%)    Parameters below as of 04 Dec 2023 08:16  Patient On (Oxygen Delivery Method): room air      CONSTITUTIONAL:  obese    CARDIAC:   Normal rate,   regular rhythm.    no edema    RESPIRATORY:   decreased BS BL    GASTROINTESTINAL:  Abdomen soft, non-tender,   No guarding,     MUSCULOSKELETAL:   Range of motion is not limited,  No clubbing, cyanosis    NEUROLOGICAL:   Alert and oriented   No motor deficits.    LABS:                          12.5   4.33  )-----------( 214      ( 04 Dec 2023 05:02 )             38.8                                               12-04    132<L>  |  97<L>  |  20  ----------------------------<  194<H>  4.3   |  23  |  0.9    Ca    9.2      04 Dec 2023 05:02  Mg     2.0     12-04    TPro  6.9  /  Alb  4.2  /  TBili  0.4  /  DBili  x   /  AST  21  /  ALT  32  /  AlkPhos  68  12-04                                             Urinalysis Basic - ( 04 Dec 2023 05:02 )    Color: x / Appearance: x / SG: x / pH: x  Gluc: 194 mg/dL / Ketone: x  / Bili: x / Urobili: x   Blood: x / Protein: x / Nitrite: x   Leuk Esterase: x / RBC: x / WBC x   Sq Epi: x / Non Sq Epi: x / Bacteria: x                                                  LIVER FUNCTIONS - ( 04 Dec 2023 05:02 )  Alb: 4.2 g/dL / Pro: 6.9 g/dL / ALK PHOS: 68 U/L / ALT: 32 U/L / AST: 21 U/L / GGT: x                                                                                                MEDICATIONS  (STANDING):  albuterol/ipratropium for Nebulization 3 milliLiter(s) Nebulizer every 6 hours  aspirin  chewable 81 milliGRAM(s) Oral daily  atorvastatin 80 milliGRAM(s) Oral at bedtime  clopidogrel Tablet 75 milliGRAM(s) Oral daily  dextrose 5%. 1000 milliLiter(s) (100 mL/Hr) IV Continuous <Continuous>  dextrose 5%. 1000 milliLiter(s) (50 mL/Hr) IV Continuous <Continuous>  dextrose 50% Injectable 25 Gram(s) IV Push once  dextrose 50% Injectable 25 Gram(s) IV Push once  dextrose 50% Injectable 12.5 Gram(s) IV Push once  enalapril 20 milliGRAM(s) Oral daily  enoxaparin Injectable 40 milliGRAM(s) SubCutaneous every 24 hours  famotidine    Tablet 40 milliGRAM(s) Oral two times a day  furosemide   Injectable 40 milliGRAM(s) IV Push daily  gemfibrozil 600 milliGRAM(s) Oral two times a day  glucagon  Injectable 1 milliGRAM(s) IntraMuscular once  hydrochlorothiazide 25 milliGRAM(s) Oral daily  insulin glargine Injectable (LANTUS) 35 Unit(s) SubCutaneous at bedtime  insulin lispro (ADMELOG) corrective regimen sliding scale   SubCutaneous three times a day before meals  insulin lispro Injectable (ADMELOG) 7 Unit(s) SubCutaneous three times a day before meals  spironolactone 25 milliGRAM(s) Oral daily  tamsulosin 0.4 milliGRAM(s) Oral at bedtime    MEDICATIONS  (PRN):  dextrose Oral Gel 15 Gram(s) Oral once PRN Blood Glucose LESS THAN 70 milliGRAM(s)/deciliter      X-Rays reviewed: Patient is a 58y old  Male who presents with a chief complaint of Shortness of breath (03 Dec 2023 16:18)      HPI:  57 yo M patient with a PMH of HTN, diabetes, BPH and CAD s/p PCI, presents to the ED with dyspnea on exertion. Patient reports that over the past week he has been having upper URI, with persistent cough, productive with scant sputum production.  Patient has been also getting more dyspneic on ambulation, partially relieved on rest.  No chest pain fevers/ chills, abdominal pain, nausea/ vomiting, changes in bowel/ urinary habits.    In the ED, the patient was HD stable, afebrile, satting 88% on RA.  Labs were significant for mild transaminitis, a pro-BNP= 964.  VBG showed: pH= 7.38/51/43/30/63.5%.  EKG showed NSR, with non- specific ST changes in the lateral leads, a RVP was negative.  A CXR showed patchy opacity over the right lower lobe with possible volume overload.    Patient received in the ED IV lasix and albuterol, with symptomatic relief after, was admitted to tele, seen by EPS, sinus pause, reports typical symptoms of SARIKA, snoring/ EDS, all over feels better walking around the floor      PAST MEDICAL & SURGICAL HISTORY:  CAD S/P percutaneous coronary angioplasty      DM (diabetes mellitus)      Hyperlipidemia      HTN (hypertension)          SOCIAL HX:   Smoking     current 20+ pack/year         FAMILY HISTORY:  .  No cardiovascular or pulmonary family history     REVIEW OF SYSTEMS:    All ROS are negative exept per HPI       Allergies    No Known Allergies    Intolerances      PHYSICAL EXAM  Vital Signs Last 24 Hrs  T(C): 36.2 (03 Dec 2023 20:59), Max: 36.2 (03 Dec 2023 20:59)  T(F): 97.2 (03 Dec 2023 20:59), Max: 97.2 (03 Dec 2023 20:59)  HR: 84 (04 Dec 2023 05:25) (73 - 85)  BP: 165/77 (04 Dec 2023 05:25) (136/61 - 165/77)  BP(mean): 100 (03 Dec 2023 16:37) (96 - 100)  RR: 18 (04 Dec 2023 08:16) (18 - 18)  SpO2: 99% (04 Dec 2023 08:16) (99% - 99%)    Parameters below as of 04 Dec 2023 08:16  Patient On (Oxygen Delivery Method): room air      CONSTITUTIONAL:  obese    CARDIAC:   Normal rate,   regular rhythm.    no edema    RESPIRATORY:   decreased BS BL    GASTROINTESTINAL:  Abdomen soft, non-tender,   No guarding,     MUSCULOSKELETAL:   Range of motion is not limited,  No clubbing, cyanosis    NEUROLOGICAL:   Alert and oriented   No motor deficits.    LABS:                          12.5   4.33  )-----------( 214      ( 04 Dec 2023 05:02 )             38.8                                               12-04    132<L>  |  97<L>  |  20  ----------------------------<  194<H>  4.3   |  23  |  0.9    Ca    9.2      04 Dec 2023 05:02  Mg     2.0     12-04    TPro  6.9  /  Alb  4.2  /  TBili  0.4  /  DBili  x   /  AST  21  /  ALT  32  /  AlkPhos  68  12-04                                             Urinalysis Basic - ( 04 Dec 2023 05:02 )    Color: x / Appearance: x / SG: x / pH: x  Gluc: 194 mg/dL / Ketone: x  / Bili: x / Urobili: x   Blood: x / Protein: x / Nitrite: x   Leuk Esterase: x / RBC: x / WBC x   Sq Epi: x / Non Sq Epi: x / Bacteria: x                                                  LIVER FUNCTIONS - ( 04 Dec 2023 05:02 )  Alb: 4.2 g/dL / Pro: 6.9 g/dL / ALK PHOS: 68 U/L / ALT: 32 U/L / AST: 21 U/L / GGT: x                                                                                                MEDICATIONS  (STANDING):  albuterol/ipratropium for Nebulization 3 milliLiter(s) Nebulizer every 6 hours  aspirin  chewable 81 milliGRAM(s) Oral daily  atorvastatin 80 milliGRAM(s) Oral at bedtime  clopidogrel Tablet 75 milliGRAM(s) Oral daily  dextrose 5%. 1000 milliLiter(s) (100 mL/Hr) IV Continuous <Continuous>  dextrose 5%. 1000 milliLiter(s) (50 mL/Hr) IV Continuous <Continuous>  dextrose 50% Injectable 25 Gram(s) IV Push once  dextrose 50% Injectable 25 Gram(s) IV Push once  dextrose 50% Injectable 12.5 Gram(s) IV Push once  enalapril 20 milliGRAM(s) Oral daily  enoxaparin Injectable 40 milliGRAM(s) SubCutaneous every 24 hours  famotidine    Tablet 40 milliGRAM(s) Oral two times a day  furosemide   Injectable 40 milliGRAM(s) IV Push daily  gemfibrozil 600 milliGRAM(s) Oral two times a day  glucagon  Injectable 1 milliGRAM(s) IntraMuscular once  hydrochlorothiazide 25 milliGRAM(s) Oral daily  insulin glargine Injectable (LANTUS) 35 Unit(s) SubCutaneous at bedtime  insulin lispro (ADMELOG) corrective regimen sliding scale   SubCutaneous three times a day before meals  insulin lispro Injectable (ADMELOG) 7 Unit(s) SubCutaneous three times a day before meals  spironolactone 25 milliGRAM(s) Oral daily  tamsulosin 0.4 milliGRAM(s) Oral at bedtime    MEDICATIONS  (PRN):  dextrose Oral Gel 15 Gram(s) Oral once PRN Blood Glucose LESS THAN 70 milliGRAM(s)/deciliter      X-Rays reviewed:

## 2023-12-04 NOTE — DISCHARGE NOTE NURSING/CASE MANAGEMENT/SOCIAL WORK - PATIENT PORTAL LINK FT
You can access the FollowMyHealth Patient Portal offered by [facility] by registering at the following website: [email or web address]. By joining [employer]’s FollowMyHealth portal, you will also be able to view your health information using other applications (apps) compatible with our system. You can access the FollowMyHealth Patient Portal offered by [facility] by registering at the following website: [email or web address]. By joining [employer]’s FollowMyHealth portal, you will also be able to view your health information using other applications (apps) compatible with our system.

## 2023-12-04 NOTE — CONSULT NOTE ADULT - ASSESSMENT
Impression     CAP?  Probable SARIKA  Current smoker  New onset Afib  HO CAD s/p PCI    Plan    Currently on RA  CXR with RLL opacity noted  Course of Levaquin PO 750mg  Outpatient sleep study  Incentive spirometry  PFTs once respiratory symptoms resolve   Albuterol PRN   consult for smoking cessation intervention Impression     CAP?  Probable SARIKA  Current smoker  New onset Afib  HO CAD s/p PCI    Plan    Currently on RA  CXR with RLL opacity noted  Course of Levaquin PO 750mg  Incentive spirometry  Albuterol PRN   consult for smoking cessation intervention  CPAP qhs PEEP 12  CT Chest non con  Outpatient sleep study  PFTs outpatient once respiratory symptoms resolve Impression     Acute hypoxemic resp failure  Pul edema  highly SARIKA  Current smoker/ COPD  New onset Afib  HO CAD s/p PCI    Plan    Currently on RA  CXR noted  Change lasix to po  Incentive spirometry  Albuterol PRN  CPAP qhs PEEP 12/ op Sleep study  CT Chest non con cab be done OP  Outpatient sleep study  PFTs outpatient once respiratory symptoms resolve

## 2023-12-04 NOTE — PROGRESS NOTE ADULT - ASSESSMENT
57 yo M PMHx HTN, DM II, BPH, and CAD s/p PCI, presented to the ED for evaluation of dyspnea on exertion. Patient endorses URI symptoms in last week. Patient endorsed worsening shortness of breath and persistent cough in the last few days. States he is more dyspneic with ambulation, and also more short of breath in the mornings when he first wakes up. Also endorses worsening LE edema recently.       Dyspnea on exertion  Acute Hypoxic Respiratory Failure   - suspected due to acute on chronic HFpEF  - pneumonia suspected on admission but absence of cough, fever, leukocytosis and procalcitonin 0.06 suggests agasint PNA  - In the ED, pulse oxygenation was 88%, increased to 97% on 3L NC, after receiving albuterol and IV lasix further suggesting chf exacerbation  - RVP negative  - continue off abx and monitor  - change IV diuresis to PO  - echo showed moderate AS   - . Likely falsely low due to obesity  - pt net negative 5L again today    New murmur  - likely from moderate AS    Sinus pause  - as per pt he was diagnosed with SARIKA by his PMD. Never had formal sleep study test, does not use CPAP or bipap at home  - hold metoprolol  - vbg CO2 51--will likely qualify for bipap-  - awiaitn pulm eval for bipap recs  - overnight patient had 30 second arrest? Unclear if artifiact. As per intern and RN there was no overnight sign outs regarding this  - case d/w EP who is evaluating this    New onset afib?  - afib RVR noted today  - awaitin EP input  - no CCB or BB given   - hold off ac until EP eval    HTN  - C/w enalapril, HCTZ,   - holdingToprol due to pause    DLD  - C/w atorvastatin, and gemfibrozil    BPH  - C/w tamsulosin    CAD s/ PCI  - C/w aspirin, Plavix, and atorvastatin 80     DM II  - c/w insulin  - FS controlled    DVT ppx: Lovenox 40 mg qday    Pt is high risk    #Progress Note Handoff:  Pending (specify):  pulmonary for cpap, EP for pauses  Family discussion: As above with patient  Disposition: Home_x__/SNF___/Other________/Unknown at this time________

## 2023-12-04 NOTE — PROGRESS NOTE ADULT - NS ATTEND AMEND GEN_ALL_CORE FT
Persistent AF w/RVR  SInus Bradycardia  Sinus Pause    - SARIKA w-up  - Start Metoprolol 25 mg PO q12  _ Tele  - Increase as tolerated  - If continued to have episodes of pauses/SND, we will discuss PPM. For now, no indication for PPM. Risks/benefits discussed with patient. Agreeable  - D/w primary team

## 2023-12-04 NOTE — PROGRESS NOTE ADULT - ASSESSMENT
59 yo M PMHx HTN, DM II, BPH, and CAD s/p PCI, presented to the ED for evaluation of dyspnea on exertion. Patient endorses URI symptoms in last week. Patient endorsed worsening shortness of breath and persistent cough in the last few days. States he is more dyspneic with ambulation, and also more short of breath in the mornings when he first wakes up. Also endorses worsening LE edema recently. Found to have patchy R basilar opacity on CXR and admitted for acute hypoxic respiratory failure with suspected heart failure vs PNA.     #Dyspnea on exertion  #Acute Hypoxic Respiratory Failure   - acute on chronic HFpEF  -   - RVP negative  - continue off abx and monitor  - c/w IV diuresis  - TTE 12/3: EF 70%. The left ventricular diastolic function could not be assessed in this study.    New murmur  - TTE 12/3: Trace mitral valve regurgitation, Mild tricuspid regurgitation, Moderate aortic stenosis  - r/o valvulopathy/infection leading to pause  - f/u EP    Sinus pause  - as per pt he was diagnosed with SARIKA by his PMD. Never had formal sleep study test, does not use CPAP or bipap at home  - holding metoprolol  - vbg CO2 51  - pulm recs appreciated: levaquin PO 750mg qd course, incentive enma, PFTs once respiratory symptoms resolve, outpatient sleep study, albuterol prn, smoking cessation counseling    HTN  - C/w enalapril, HCTZ,   - holding Toprol due to pause    DLD  - C/w atorvastatin, and gemfibrozil    BPH  - C/w tamsulosin    CAD s/ PCI  - C/w aspirin, Plavix, and atorvastatin 80     DM II  - c/w insulin  - FS controlled    DVT ppx: Lovenox 40 mg qd  GI ppx: famotidine 40 bid  Diet: DASH CC

## 2023-12-05 LAB
ALBUMIN SERPL ELPH-MCNC: 3.9 G/DL — SIGNIFICANT CHANGE UP (ref 3.5–5.2)
ALBUMIN SERPL ELPH-MCNC: 3.9 G/DL — SIGNIFICANT CHANGE UP (ref 3.5–5.2)
ALP SERPL-CCNC: 64 U/L — SIGNIFICANT CHANGE UP (ref 30–115)
ALP SERPL-CCNC: 64 U/L — SIGNIFICANT CHANGE UP (ref 30–115)
ALT FLD-CCNC: 27 U/L — SIGNIFICANT CHANGE UP (ref 0–41)
ALT FLD-CCNC: 27 U/L — SIGNIFICANT CHANGE UP (ref 0–41)
ANION GAP SERPL CALC-SCNC: 9 MMOL/L — SIGNIFICANT CHANGE UP (ref 7–14)
ANION GAP SERPL CALC-SCNC: 9 MMOL/L — SIGNIFICANT CHANGE UP (ref 7–14)
AST SERPL-CCNC: 17 U/L — SIGNIFICANT CHANGE UP (ref 0–41)
AST SERPL-CCNC: 17 U/L — SIGNIFICANT CHANGE UP (ref 0–41)
BASE EXCESS BLDA CALC-SCNC: 5 MMOL/L — HIGH (ref -2–3)
BASE EXCESS BLDA CALC-SCNC: 5 MMOL/L — HIGH (ref -2–3)
BASOPHILS # BLD AUTO: 0.02 K/UL — SIGNIFICANT CHANGE UP (ref 0–0.2)
BASOPHILS # BLD AUTO: 0.02 K/UL — SIGNIFICANT CHANGE UP (ref 0–0.2)
BASOPHILS NFR BLD AUTO: 0.4 % — SIGNIFICANT CHANGE UP (ref 0–1)
BASOPHILS NFR BLD AUTO: 0.4 % — SIGNIFICANT CHANGE UP (ref 0–1)
BILIRUB SERPL-MCNC: 0.5 MG/DL — SIGNIFICANT CHANGE UP (ref 0.2–1.2)
BILIRUB SERPL-MCNC: 0.5 MG/DL — SIGNIFICANT CHANGE UP (ref 0.2–1.2)
BUN SERPL-MCNC: 18 MG/DL — SIGNIFICANT CHANGE UP (ref 10–20)
BUN SERPL-MCNC: 18 MG/DL — SIGNIFICANT CHANGE UP (ref 10–20)
CALCIUM SERPL-MCNC: 9 MG/DL — SIGNIFICANT CHANGE UP (ref 8.4–10.4)
CALCIUM SERPL-MCNC: 9 MG/DL — SIGNIFICANT CHANGE UP (ref 8.4–10.4)
CHLORIDE SERPL-SCNC: 94 MMOL/L — LOW (ref 98–110)
CHLORIDE SERPL-SCNC: 94 MMOL/L — LOW (ref 98–110)
CO2 SERPL-SCNC: 26 MMOL/L — SIGNIFICANT CHANGE UP (ref 17–32)
CO2 SERPL-SCNC: 26 MMOL/L — SIGNIFICANT CHANGE UP (ref 17–32)
CREAT SERPL-MCNC: 1 MG/DL — SIGNIFICANT CHANGE UP (ref 0.7–1.5)
CREAT SERPL-MCNC: 1 MG/DL — SIGNIFICANT CHANGE UP (ref 0.7–1.5)
EGFR: 86 ML/MIN/1.73M2 — SIGNIFICANT CHANGE UP
EGFR: 86 ML/MIN/1.73M2 — SIGNIFICANT CHANGE UP
EOSINOPHIL # BLD AUTO: 0.23 K/UL — SIGNIFICANT CHANGE UP (ref 0–0.7)
EOSINOPHIL # BLD AUTO: 0.23 K/UL — SIGNIFICANT CHANGE UP (ref 0–0.7)
EOSINOPHIL NFR BLD AUTO: 5.1 % — SIGNIFICANT CHANGE UP (ref 0–8)
EOSINOPHIL NFR BLD AUTO: 5.1 % — SIGNIFICANT CHANGE UP (ref 0–8)
GLUCOSE BLDC GLUCOMTR-MCNC: 206 MG/DL — HIGH (ref 70–99)
GLUCOSE BLDC GLUCOMTR-MCNC: 206 MG/DL — HIGH (ref 70–99)
GLUCOSE BLDC GLUCOMTR-MCNC: 213 MG/DL — HIGH (ref 70–99)
GLUCOSE BLDC GLUCOMTR-MCNC: 213 MG/DL — HIGH (ref 70–99)
GLUCOSE BLDC GLUCOMTR-MCNC: 239 MG/DL — HIGH (ref 70–99)
GLUCOSE BLDC GLUCOMTR-MCNC: 239 MG/DL — HIGH (ref 70–99)
GLUCOSE BLDC GLUCOMTR-MCNC: 250 MG/DL — HIGH (ref 70–99)
GLUCOSE BLDC GLUCOMTR-MCNC: 250 MG/DL — HIGH (ref 70–99)
GLUCOSE SERPL-MCNC: 206 MG/DL — HIGH (ref 70–99)
GLUCOSE SERPL-MCNC: 206 MG/DL — HIGH (ref 70–99)
HCO3 BLDA-SCNC: 29 MMOL/L — HIGH (ref 21–28)
HCO3 BLDA-SCNC: 29 MMOL/L — HIGH (ref 21–28)
HCT VFR BLD CALC: 40.4 % — LOW (ref 42–52)
HCT VFR BLD CALC: 40.4 % — LOW (ref 42–52)
HGB BLD-MCNC: 12.9 G/DL — LOW (ref 14–18)
HGB BLD-MCNC: 12.9 G/DL — LOW (ref 14–18)
HOROWITZ INDEX BLDA+IHG-RTO: 21 — SIGNIFICANT CHANGE UP
HOROWITZ INDEX BLDA+IHG-RTO: 21 — SIGNIFICANT CHANGE UP
IMM GRANULOCYTES NFR BLD AUTO: 0.2 % — SIGNIFICANT CHANGE UP (ref 0.1–0.3)
IMM GRANULOCYTES NFR BLD AUTO: 0.2 % — SIGNIFICANT CHANGE UP (ref 0.1–0.3)
LYMPHOCYTES # BLD AUTO: 1.77 K/UL — SIGNIFICANT CHANGE UP (ref 1.2–3.4)
LYMPHOCYTES # BLD AUTO: 1.77 K/UL — SIGNIFICANT CHANGE UP (ref 1.2–3.4)
LYMPHOCYTES # BLD AUTO: 39.6 % — SIGNIFICANT CHANGE UP (ref 20.5–51.1)
LYMPHOCYTES # BLD AUTO: 39.6 % — SIGNIFICANT CHANGE UP (ref 20.5–51.1)
MAGNESIUM SERPL-MCNC: 2 MG/DL — SIGNIFICANT CHANGE UP (ref 1.8–2.4)
MAGNESIUM SERPL-MCNC: 2 MG/DL — SIGNIFICANT CHANGE UP (ref 1.8–2.4)
MCHC RBC-ENTMCNC: 27 PG — SIGNIFICANT CHANGE UP (ref 27–31)
MCHC RBC-ENTMCNC: 27 PG — SIGNIFICANT CHANGE UP (ref 27–31)
MCHC RBC-ENTMCNC: 31.9 G/DL — LOW (ref 32–37)
MCHC RBC-ENTMCNC: 31.9 G/DL — LOW (ref 32–37)
MCV RBC AUTO: 84.7 FL — SIGNIFICANT CHANGE UP (ref 80–94)
MCV RBC AUTO: 84.7 FL — SIGNIFICANT CHANGE UP (ref 80–94)
MONOCYTES # BLD AUTO: 0.6 K/UL — SIGNIFICANT CHANGE UP (ref 0.1–0.6)
MONOCYTES # BLD AUTO: 0.6 K/UL — SIGNIFICANT CHANGE UP (ref 0.1–0.6)
MONOCYTES NFR BLD AUTO: 13.4 % — HIGH (ref 1.7–9.3)
MONOCYTES NFR BLD AUTO: 13.4 % — HIGH (ref 1.7–9.3)
NEUTROPHILS # BLD AUTO: 1.84 K/UL — SIGNIFICANT CHANGE UP (ref 1.4–6.5)
NEUTROPHILS # BLD AUTO: 1.84 K/UL — SIGNIFICANT CHANGE UP (ref 1.4–6.5)
NEUTROPHILS NFR BLD AUTO: 41.3 % — LOW (ref 42.2–75.2)
NEUTROPHILS NFR BLD AUTO: 41.3 % — LOW (ref 42.2–75.2)
NRBC # BLD: 0 /100 WBCS — SIGNIFICANT CHANGE UP (ref 0–0)
NRBC # BLD: 0 /100 WBCS — SIGNIFICANT CHANGE UP (ref 0–0)
PCO2 BLDA: 40 MMHG — SIGNIFICANT CHANGE UP (ref 35–48)
PCO2 BLDA: 40 MMHG — SIGNIFICANT CHANGE UP (ref 35–48)
PH BLDA: 7.47 — HIGH (ref 7.35–7.45)
PH BLDA: 7.47 — HIGH (ref 7.35–7.45)
PLATELET # BLD AUTO: 216 K/UL — SIGNIFICANT CHANGE UP (ref 130–400)
PLATELET # BLD AUTO: 216 K/UL — SIGNIFICANT CHANGE UP (ref 130–400)
PMV BLD: 10.4 FL — SIGNIFICANT CHANGE UP (ref 7.4–10.4)
PMV BLD: 10.4 FL — SIGNIFICANT CHANGE UP (ref 7.4–10.4)
PO2 BLDA: 94 MMHG — SIGNIFICANT CHANGE UP (ref 83–108)
PO2 BLDA: 94 MMHG — SIGNIFICANT CHANGE UP (ref 83–108)
POTASSIUM SERPL-MCNC: 4.3 MMOL/L — SIGNIFICANT CHANGE UP (ref 3.5–5)
POTASSIUM SERPL-MCNC: 4.3 MMOL/L — SIGNIFICANT CHANGE UP (ref 3.5–5)
POTASSIUM SERPL-SCNC: 4.3 MMOL/L — SIGNIFICANT CHANGE UP (ref 3.5–5)
POTASSIUM SERPL-SCNC: 4.3 MMOL/L — SIGNIFICANT CHANGE UP (ref 3.5–5)
PROT SERPL-MCNC: 6.4 G/DL — SIGNIFICANT CHANGE UP (ref 6–8)
PROT SERPL-MCNC: 6.4 G/DL — SIGNIFICANT CHANGE UP (ref 6–8)
RBC # BLD: 4.77 M/UL — SIGNIFICANT CHANGE UP (ref 4.7–6.1)
RBC # BLD: 4.77 M/UL — SIGNIFICANT CHANGE UP (ref 4.7–6.1)
RBC # FLD: 14.9 % — HIGH (ref 11.5–14.5)
RBC # FLD: 14.9 % — HIGH (ref 11.5–14.5)
SAO2 % BLDA: 98.6 % — HIGH (ref 94–98)
SAO2 % BLDA: 98.6 % — HIGH (ref 94–98)
SODIUM SERPL-SCNC: 129 MMOL/L — LOW (ref 135–146)
SODIUM SERPL-SCNC: 129 MMOL/L — LOW (ref 135–146)
T4 FREE SERPL-MCNC: 1.2 NG/DL — SIGNIFICANT CHANGE UP (ref 0.9–1.8)
T4 FREE SERPL-MCNC: 1.2 NG/DL — SIGNIFICANT CHANGE UP (ref 0.9–1.8)
TSH SERPL-MCNC: 2.51 UIU/ML — SIGNIFICANT CHANGE UP (ref 0.27–4.2)
TSH SERPL-MCNC: 2.51 UIU/ML — SIGNIFICANT CHANGE UP (ref 0.27–4.2)
WBC # BLD: 4.47 K/UL — LOW (ref 4.8–10.8)
WBC # BLD: 4.47 K/UL — LOW (ref 4.8–10.8)
WBC # FLD AUTO: 4.47 K/UL — LOW (ref 4.8–10.8)
WBC # FLD AUTO: 4.47 K/UL — LOW (ref 4.8–10.8)

## 2023-12-05 PROCEDURE — 99233 SBSQ HOSP IP/OBS HIGH 50: CPT

## 2023-12-05 PROCEDURE — 99222 1ST HOSP IP/OBS MODERATE 55: CPT

## 2023-12-05 RX ORDER — APIXABAN 2.5 MG/1
1 TABLET, FILM COATED ORAL
Qty: 60 | Refills: 1
Start: 2023-12-05 | End: 2024-02-02

## 2023-12-05 RX ADMIN — Medication 2: at 12:36

## 2023-12-05 RX ADMIN — APIXABAN 5 MILLIGRAM(S): 2.5 TABLET, FILM COATED ORAL at 17:31

## 2023-12-05 RX ADMIN — ATORVASTATIN CALCIUM 80 MILLIGRAM(S): 80 TABLET, FILM COATED ORAL at 21:40

## 2023-12-05 RX ADMIN — Medication 7 UNIT(S): at 16:28

## 2023-12-05 RX ADMIN — TAMSULOSIN HYDROCHLORIDE 0.4 MILLIGRAM(S): 0.4 CAPSULE ORAL at 21:40

## 2023-12-05 RX ADMIN — INSULIN GLARGINE 35 UNIT(S): 100 INJECTION, SOLUTION SUBCUTANEOUS at 21:39

## 2023-12-05 RX ADMIN — Medication 40 MILLIGRAM(S): at 06:13

## 2023-12-05 RX ADMIN — Medication 600 MILLIGRAM(S): at 17:31

## 2023-12-05 RX ADMIN — CLOPIDOGREL BISULFATE 75 MILLIGRAM(S): 75 TABLET, FILM COATED ORAL at 11:27

## 2023-12-05 RX ADMIN — Medication 7 UNIT(S): at 12:37

## 2023-12-05 RX ADMIN — Medication 20 MILLIGRAM(S): at 06:14

## 2023-12-05 RX ADMIN — FAMOTIDINE 40 MILLIGRAM(S): 10 INJECTION INTRAVENOUS at 17:31

## 2023-12-05 RX ADMIN — APIXABAN 5 MILLIGRAM(S): 2.5 TABLET, FILM COATED ORAL at 06:14

## 2023-12-05 RX ADMIN — Medication 7 UNIT(S): at 07:51

## 2023-12-05 RX ADMIN — Medication 2: at 07:50

## 2023-12-05 RX ADMIN — Medication 3 MILLILITER(S): at 14:59

## 2023-12-05 RX ADMIN — Medication 2: at 16:28

## 2023-12-05 RX ADMIN — SPIRONOLACTONE 25 MILLIGRAM(S): 25 TABLET, FILM COATED ORAL at 06:14

## 2023-12-05 RX ADMIN — Medication 3 MILLILITER(S): at 08:36

## 2023-12-05 RX ADMIN — Medication 25 MILLIGRAM(S): at 06:14

## 2023-12-05 RX ADMIN — FAMOTIDINE 40 MILLIGRAM(S): 10 INJECTION INTRAVENOUS at 06:13

## 2023-12-05 RX ADMIN — Medication 600 MILLIGRAM(S): at 06:14

## 2023-12-05 RX ADMIN — Medication 25 MILLIGRAM(S): at 17:31

## 2023-12-05 NOTE — PROGRESS NOTE ADULT - ASSESSMENT
59 yo M PMHx HTN, DM II, BPH, and CAD s/p PCI, presented to the ED for evaluation of dyspnea on exertion. Patient endorses URI symptoms in last week. Patient endorsed worsening shortness of breath and persistent cough in the last few days. States he is more dyspneic with ambulation, and also more short of breath in the mornings when he first wakes up. Also endorses worsening LE edema recently.       Dyspnea on exertion  Acute Hypoxic Respiratory Failure   - suspected due to acute on chronic HFpEF  - pneumonia suspected on admission but absence of cough, fever, leukocytosis and procalcitonin 0.06 suggests against PNA  - In the ED, pulse oxygenation was 88%, increased to 97% on 3L NC, after receiving albuterol and IV lasix further suggesting chf exacerbation  - RVP negative  - continue off abx and monitor  - change IV diuresis to PO  - echo showed moderate AS   - . Likely falsely low due to obesity  - remains net negatrive    New murmur  - likely from moderate AS  - oupt follow    Sinus pause  - as per pt he was diagnosed with SARIKA by his PMD. Never had formal sleep study test, does not use CPAP or bipap at home  - hold metoprolol  - appreciatepulm eval for bipap recs  - on 12/4  patient had 30 second arrest? EP initially thought arrest-later suspected to be artifact  - MCOT on DC   - case d/w EP who is evaluating this    New onset afib?  - afib RVR noted today  - Eliuqis started-pt aware of risks of bleeding vs benefits  - metoprolol started as per EP-need to uptitrate as RVR  - was previously on toprol 200 mg but had pauses    Suspected sleep apnea  - as per CM patient cannot get home cpap without sleep study  - will obtain ABG to see if CO2 can qualify him    HTN  - C/w enalapril, HCTZ,     DLD  - C/w atorvastatin, and gemfibrozil    BPH  - C/w tamsulosin    CAD s/ PCI  - C/w aspirin, Plavix, and atorvastatin 80     DM II  - c/w insulin  - FS controlled    DVT ppx: Lovenox 40 mg qday    Pt is high risk    #Progress Note Handoff:  Pending (specify):  HR control, home CPAP  Family discussion: As above with patient  Disposition: Home_x__/SNF___/Other________/Unknown at this time________

## 2023-12-05 NOTE — PROGRESS NOTE ADULT - ASSESSMENT
57 yo M PMHx HTN, DM II, BPH, and CAD s/p PCI, presented to the ED for evaluation of dyspnea on exertion. Patient endorses URI symptoms in last week. Patient endorsed worsening shortness of breath and persistent cough in the last few days. States he is more dyspneic with ambulation, and also more short of breath in the mornings when he first wakes up. Also endorses worsening LE edema recently.       Dyspnea on exertion  Acute Hypoxic Respiratory Failure   - suspected due to acute on chronic HFpEF  - pneumonia suspected on admission but absence of cough, fever, leukocytosis and procalcitonin 0.06 suggests agasint PNA  - In the ED, pulse oxygenation was 88%, increased to 97% on 3L NC, after receiving albuterol and IV lasix further suggesting chf exacerbation  - RVP negative  - continue off abx and monitor  - change IV diuresis to PO  - echo showed moderate AS   - . Likely falsely low due to obesity      New murmur  - likely from moderate AS    Sinus pause  - as per pt he was diagnosed with SARIKA by his PMD. Never had formal sleep study test, does not use CPAP or bipap at home  - restart metoprolol 25 titrate BID  - vbg CO2 51--will likely qualify for bipap-  - per pulm eval: can start CPAP qhs PEEP 12 in hospital and f/u sleep study and PFTs outpatient  - overnight patient had 30 second arrest possible artifiact, per intern and RN there was no overnight sign outs regarding this  - case d/w EP who is evaluating this    New onset afib?  - afib RVR noted   - TSH 2.51 on 12/5  - per EP consults: restart metoprolol 25 titrate BID, start eliquis 5, stop ASA cont plavix  - check HR while ambulating for metoprolol dosing  - no CCB or BB given   - continue tele monitor    HTN  - C/w enalapril, HCTZ, metoprolol    DLD  - C/w atorvastatin, and gemfibrozil    BPH  - C/w tamsulosin    CAD s/ PCI  - C/w Plavix, and atorvastatin 80     DM II  - c/w insulin  - FS controlled    DVT ppx: Lovenox 40 mg qday  diet: DASH/TLC  Pt is high risk

## 2023-12-06 ENCOUNTER — TRANSCRIPTION ENCOUNTER (OUTPATIENT)
Age: 58
End: 2023-12-06

## 2023-12-06 LAB
ALBUMIN SERPL ELPH-MCNC: 4.1 G/DL — SIGNIFICANT CHANGE UP (ref 3.5–5.2)
ALBUMIN SERPL ELPH-MCNC: 4.1 G/DL — SIGNIFICANT CHANGE UP (ref 3.5–5.2)
ALP SERPL-CCNC: 71 U/L — SIGNIFICANT CHANGE UP (ref 30–115)
ALP SERPL-CCNC: 71 U/L — SIGNIFICANT CHANGE UP (ref 30–115)
ALT FLD-CCNC: 41 U/L — SIGNIFICANT CHANGE UP (ref 0–41)
ALT FLD-CCNC: 41 U/L — SIGNIFICANT CHANGE UP (ref 0–41)
ANION GAP SERPL CALC-SCNC: 10 MMOL/L — SIGNIFICANT CHANGE UP (ref 7–14)
ANION GAP SERPL CALC-SCNC: 10 MMOL/L — SIGNIFICANT CHANGE UP (ref 7–14)
AST SERPL-CCNC: 26 U/L — SIGNIFICANT CHANGE UP (ref 0–41)
AST SERPL-CCNC: 26 U/L — SIGNIFICANT CHANGE UP (ref 0–41)
BASOPHILS # BLD AUTO: 0.03 K/UL — SIGNIFICANT CHANGE UP (ref 0–0.2)
BASOPHILS # BLD AUTO: 0.03 K/UL — SIGNIFICANT CHANGE UP (ref 0–0.2)
BASOPHILS NFR BLD AUTO: 0.6 % — SIGNIFICANT CHANGE UP (ref 0–1)
BASOPHILS NFR BLD AUTO: 0.6 % — SIGNIFICANT CHANGE UP (ref 0–1)
BILIRUB SERPL-MCNC: 0.4 MG/DL — SIGNIFICANT CHANGE UP (ref 0.2–1.2)
BILIRUB SERPL-MCNC: 0.4 MG/DL — SIGNIFICANT CHANGE UP (ref 0.2–1.2)
BUN SERPL-MCNC: 23 MG/DL — HIGH (ref 10–20)
BUN SERPL-MCNC: 23 MG/DL — HIGH (ref 10–20)
CALCIUM SERPL-MCNC: 9.4 MG/DL — SIGNIFICANT CHANGE UP (ref 8.4–10.5)
CALCIUM SERPL-MCNC: 9.4 MG/DL — SIGNIFICANT CHANGE UP (ref 8.4–10.5)
CHLORIDE SERPL-SCNC: 97 MMOL/L — LOW (ref 98–110)
CHLORIDE SERPL-SCNC: 97 MMOL/L — LOW (ref 98–110)
CO2 SERPL-SCNC: 25 MMOL/L — SIGNIFICANT CHANGE UP (ref 17–32)
CO2 SERPL-SCNC: 25 MMOL/L — SIGNIFICANT CHANGE UP (ref 17–32)
CREAT SERPL-MCNC: 1.1 MG/DL — SIGNIFICANT CHANGE UP (ref 0.7–1.5)
CREAT SERPL-MCNC: 1.1 MG/DL — SIGNIFICANT CHANGE UP (ref 0.7–1.5)
EGFR: 77 ML/MIN/1.73M2 — SIGNIFICANT CHANGE UP
EGFR: 77 ML/MIN/1.73M2 — SIGNIFICANT CHANGE UP
EOSINOPHIL # BLD AUTO: 0.21 K/UL — SIGNIFICANT CHANGE UP (ref 0–0.7)
EOSINOPHIL # BLD AUTO: 0.21 K/UL — SIGNIFICANT CHANGE UP (ref 0–0.7)
EOSINOPHIL NFR BLD AUTO: 4.5 % — SIGNIFICANT CHANGE UP (ref 0–8)
EOSINOPHIL NFR BLD AUTO: 4.5 % — SIGNIFICANT CHANGE UP (ref 0–8)
GLUCOSE BLDC GLUCOMTR-MCNC: 156 MG/DL — HIGH (ref 70–99)
GLUCOSE BLDC GLUCOMTR-MCNC: 156 MG/DL — HIGH (ref 70–99)
GLUCOSE BLDC GLUCOMTR-MCNC: 164 MG/DL — HIGH (ref 70–99)
GLUCOSE BLDC GLUCOMTR-MCNC: 164 MG/DL — HIGH (ref 70–99)
GLUCOSE BLDC GLUCOMTR-MCNC: 259 MG/DL — HIGH (ref 70–99)
GLUCOSE BLDC GLUCOMTR-MCNC: 259 MG/DL — HIGH (ref 70–99)
GLUCOSE BLDC GLUCOMTR-MCNC: 262 MG/DL — HIGH (ref 70–99)
GLUCOSE BLDC GLUCOMTR-MCNC: 262 MG/DL — HIGH (ref 70–99)
GLUCOSE SERPL-MCNC: 242 MG/DL — HIGH (ref 70–99)
GLUCOSE SERPL-MCNC: 242 MG/DL — HIGH (ref 70–99)
HCT VFR BLD CALC: 41.1 % — LOW (ref 42–52)
HCT VFR BLD CALC: 41.1 % — LOW (ref 42–52)
HGB BLD-MCNC: 13.1 G/DL — LOW (ref 14–18)
HGB BLD-MCNC: 13.1 G/DL — LOW (ref 14–18)
IMM GRANULOCYTES NFR BLD AUTO: 0.2 % — SIGNIFICANT CHANGE UP (ref 0.1–0.3)
IMM GRANULOCYTES NFR BLD AUTO: 0.2 % — SIGNIFICANT CHANGE UP (ref 0.1–0.3)
LYMPHOCYTES # BLD AUTO: 1.99 K/UL — SIGNIFICANT CHANGE UP (ref 1.2–3.4)
LYMPHOCYTES # BLD AUTO: 1.99 K/UL — SIGNIFICANT CHANGE UP (ref 1.2–3.4)
LYMPHOCYTES # BLD AUTO: 43.1 % — SIGNIFICANT CHANGE UP (ref 20.5–51.1)
LYMPHOCYTES # BLD AUTO: 43.1 % — SIGNIFICANT CHANGE UP (ref 20.5–51.1)
MAGNESIUM SERPL-MCNC: 2.2 MG/DL — SIGNIFICANT CHANGE UP (ref 1.8–2.4)
MAGNESIUM SERPL-MCNC: 2.2 MG/DL — SIGNIFICANT CHANGE UP (ref 1.8–2.4)
MCHC RBC-ENTMCNC: 27 PG — SIGNIFICANT CHANGE UP (ref 27–31)
MCHC RBC-ENTMCNC: 27 PG — SIGNIFICANT CHANGE UP (ref 27–31)
MCHC RBC-ENTMCNC: 31.9 G/DL — LOW (ref 32–37)
MCHC RBC-ENTMCNC: 31.9 G/DL — LOW (ref 32–37)
MCV RBC AUTO: 84.7 FL — SIGNIFICANT CHANGE UP (ref 80–94)
MCV RBC AUTO: 84.7 FL — SIGNIFICANT CHANGE UP (ref 80–94)
MONOCYTES # BLD AUTO: 0.58 K/UL — SIGNIFICANT CHANGE UP (ref 0.1–0.6)
MONOCYTES # BLD AUTO: 0.58 K/UL — SIGNIFICANT CHANGE UP (ref 0.1–0.6)
MONOCYTES NFR BLD AUTO: 12.6 % — HIGH (ref 1.7–9.3)
MONOCYTES NFR BLD AUTO: 12.6 % — HIGH (ref 1.7–9.3)
NEUTROPHILS # BLD AUTO: 1.8 K/UL — SIGNIFICANT CHANGE UP (ref 1.4–6.5)
NEUTROPHILS # BLD AUTO: 1.8 K/UL — SIGNIFICANT CHANGE UP (ref 1.4–6.5)
NEUTROPHILS NFR BLD AUTO: 39 % — LOW (ref 42.2–75.2)
NEUTROPHILS NFR BLD AUTO: 39 % — LOW (ref 42.2–75.2)
NRBC # BLD: 0 /100 WBCS — SIGNIFICANT CHANGE UP (ref 0–0)
NRBC # BLD: 0 /100 WBCS — SIGNIFICANT CHANGE UP (ref 0–0)
PLATELET # BLD AUTO: 223 K/UL — SIGNIFICANT CHANGE UP (ref 130–400)
PLATELET # BLD AUTO: 223 K/UL — SIGNIFICANT CHANGE UP (ref 130–400)
PMV BLD: 10.1 FL — SIGNIFICANT CHANGE UP (ref 7.4–10.4)
PMV BLD: 10.1 FL — SIGNIFICANT CHANGE UP (ref 7.4–10.4)
POTASSIUM SERPL-MCNC: 4.7 MMOL/L — SIGNIFICANT CHANGE UP (ref 3.5–5)
POTASSIUM SERPL-MCNC: 4.7 MMOL/L — SIGNIFICANT CHANGE UP (ref 3.5–5)
POTASSIUM SERPL-SCNC: 4.7 MMOL/L — SIGNIFICANT CHANGE UP (ref 3.5–5)
POTASSIUM SERPL-SCNC: 4.7 MMOL/L — SIGNIFICANT CHANGE UP (ref 3.5–5)
PROT SERPL-MCNC: 6.9 G/DL — SIGNIFICANT CHANGE UP (ref 6–8)
PROT SERPL-MCNC: 6.9 G/DL — SIGNIFICANT CHANGE UP (ref 6–8)
RBC # BLD: 4.85 M/UL — SIGNIFICANT CHANGE UP (ref 4.7–6.1)
RBC # BLD: 4.85 M/UL — SIGNIFICANT CHANGE UP (ref 4.7–6.1)
RBC # FLD: 15 % — HIGH (ref 11.5–14.5)
RBC # FLD: 15 % — HIGH (ref 11.5–14.5)
SODIUM SERPL-SCNC: 132 MMOL/L — LOW (ref 135–146)
SODIUM SERPL-SCNC: 132 MMOL/L — LOW (ref 135–146)
WBC # BLD: 4.62 K/UL — LOW (ref 4.8–10.8)
WBC # BLD: 4.62 K/UL — LOW (ref 4.8–10.8)
WBC # FLD AUTO: 4.62 K/UL — LOW (ref 4.8–10.8)
WBC # FLD AUTO: 4.62 K/UL — LOW (ref 4.8–10.8)

## 2023-12-06 PROCEDURE — 99232 SBSQ HOSP IP/OBS MODERATE 35: CPT

## 2023-12-06 RX ORDER — CLOPIDOGREL BISULFATE 75 MG/1
1 TABLET, FILM COATED ORAL
Refills: 0 | DISCHARGE

## 2023-12-06 RX ORDER — METOPROLOL TARTRATE 50 MG
50 TABLET ORAL
Refills: 0 | Status: DISCONTINUED | OUTPATIENT
Start: 2023-12-06 | End: 2023-12-07

## 2023-12-06 RX ORDER — FUROSEMIDE 40 MG
40 TABLET ORAL ONCE
Refills: 0 | Status: COMPLETED | OUTPATIENT
Start: 2023-12-06 | End: 2023-12-06

## 2023-12-06 RX ORDER — APIXABAN 2.5 MG/1
1 TABLET, FILM COATED ORAL
Qty: 60 | Refills: 1
Start: 2023-12-06 | End: 2024-02-03

## 2023-12-06 RX ORDER — METOPROLOL TARTRATE 50 MG
1 TABLET ORAL
Refills: 0 | DISCHARGE

## 2023-12-06 RX ORDER — METOPROLOL TARTRATE 50 MG
1 TABLET ORAL
Qty: 30 | Refills: 1
Start: 2023-12-06 | End: 2024-02-03

## 2023-12-06 RX ORDER — FUROSEMIDE 40 MG
1 TABLET ORAL
Qty: 30 | Refills: 1
Start: 2023-12-06 | End: 2024-02-03

## 2023-12-06 RX ORDER — FUROSEMIDE 40 MG
40 TABLET ORAL DAILY
Refills: 0 | Status: DISCONTINUED | OUTPATIENT
Start: 2023-12-06 | End: 2023-12-07

## 2023-12-06 RX ADMIN — Medication 600 MILLIGRAM(S): at 16:47

## 2023-12-06 RX ADMIN — INSULIN GLARGINE 35 UNIT(S): 100 INJECTION, SOLUTION SUBCUTANEOUS at 21:28

## 2023-12-06 RX ADMIN — SPIRONOLACTONE 25 MILLIGRAM(S): 25 TABLET, FILM COATED ORAL at 06:27

## 2023-12-06 RX ADMIN — Medication 7 UNIT(S): at 16:46

## 2023-12-06 RX ADMIN — Medication 7 UNIT(S): at 08:00

## 2023-12-06 RX ADMIN — FAMOTIDINE 40 MILLIGRAM(S): 10 INJECTION INTRAVENOUS at 17:34

## 2023-12-06 RX ADMIN — Medication 40 MILLIGRAM(S): at 06:27

## 2023-12-06 RX ADMIN — ATORVASTATIN CALCIUM 80 MILLIGRAM(S): 80 TABLET, FILM COATED ORAL at 21:28

## 2023-12-06 RX ADMIN — Medication 40 MILLIGRAM(S): at 14:53

## 2023-12-06 RX ADMIN — Medication 20 MILLIGRAM(S): at 06:26

## 2023-12-06 RX ADMIN — CLOPIDOGREL BISULFATE 75 MILLIGRAM(S): 75 TABLET, FILM COATED ORAL at 11:52

## 2023-12-06 RX ADMIN — APIXABAN 5 MILLIGRAM(S): 2.5 TABLET, FILM COATED ORAL at 06:26

## 2023-12-06 RX ADMIN — Medication 7 UNIT(S): at 11:52

## 2023-12-06 RX ADMIN — Medication 3: at 11:52

## 2023-12-06 RX ADMIN — Medication 25 MILLIGRAM(S): at 06:26

## 2023-12-06 RX ADMIN — TAMSULOSIN HYDROCHLORIDE 0.4 MILLIGRAM(S): 0.4 CAPSULE ORAL at 21:28

## 2023-12-06 RX ADMIN — APIXABAN 5 MILLIGRAM(S): 2.5 TABLET, FILM COATED ORAL at 16:47

## 2023-12-06 RX ADMIN — Medication 3 MILLILITER(S): at 20:10

## 2023-12-06 RX ADMIN — Medication 1: at 16:46

## 2023-12-06 RX ADMIN — FAMOTIDINE 40 MILLIGRAM(S): 10 INJECTION INTRAVENOUS at 06:26

## 2023-12-06 RX ADMIN — Medication 50 MILLIGRAM(S): at 17:37

## 2023-12-06 RX ADMIN — Medication 600 MILLIGRAM(S): at 06:26

## 2023-12-06 RX ADMIN — Medication 3 MILLILITER(S): at 14:43

## 2023-12-06 RX ADMIN — Medication 3: at 08:00

## 2023-12-06 RX ADMIN — Medication 3 MILLILITER(S): at 08:23

## 2023-12-06 NOTE — DISCHARGE NOTE PROVIDER - NPI NUMBER (FOR SYSADMIN USE ONLY) :
[id number] [[id number] [[id number]],[[id number]] [[id number]],[[id number]]

## 2023-12-06 NOTE — PROGRESS NOTE ADULT - ASSESSMENT
59 yo M PMHx HTN, DM II, BPH, and CAD s/p PCI, presented to the ED for evaluation of dyspnea on exertion. Patient endorses URI symptoms in last week. Patient endorsed worsening shortness of breath and persistent cough in the last few days. States he is more dyspneic with ambulation, and also more short of breath in the mornings when he first wakes up. Also endorses worsening LE edema recently.       Dyspnea on exertion  Acute Hypoxic Respiratory Failure   - suspected due to acute on chronic HFpEF  - pneumonia suspected on admission but absence of cough, fever, leukocytosis and procalcitonin 0.06 suggests against PNA  - In the ED, pulse oxygenation was 88%, increased to 97% on 3L NC, after receiving albuterol and IV lasix further suggesting chf exacerbation  - RVP negative  - continue off abx and monitor  - change IV diuresis to PO  - echo showed moderate AS   - . Likely falsely low due to obesity  - remains net negatrive      Sinus pause  Episode of sinus pause for 32.5 on 12/2/23  - as per pt he was diagnosed with SARIKA by his PMD. Never had formal sleep study test, does not use CPAP or bipap at home  - hold metoprolol  - appreciatepulm eval for bipap recs    - MCOT on DC   - case d/w EP who is evaluating this    New onset afib?  - afib RVR noted today  - Eliuqis started-pt aware of risks of bleeding vs benefits  - metoprolol started as per EP-need to uptitrate as RVR  - was previously on toprol 200 mg but had pauses    Suspected sleep apnea  - as per CM patient cannot get home cpap without sleep study  - will obtain ABG to see if CO2 can qualify him    HTN  - C/w enalapril, HCTZ,     DLD  - C/w atorvastatin, and gemfibrozil    BPH  - C/w tamsulosin    CAD s/ PCI  - C/w aspirin, Plavix, and atorvastatin 80     DM II  - c/w insulin  - FS controlled    DVT ppx: Lovenox 40 mg qday    Pt is high risk    #Progress Note Handoff:  Pending (specify):  HR control, home CPAP  Family discussion: As above with patient  Disposition: Home_x__/SNF___/Other________/Unknown at this time________

## 2023-12-06 NOTE — DISCHARGE NOTE PROVIDER - CARE PROVIDER_API CALL
Genaro Thomas  Established Patient  Follow Up Time:     Francisco Ceja  Family Medicine  [address] [place] [address]  Phone: [phone]  Fax: [phone]  Established Patient  Follow Up Time:    Genaro Thomas  Established Patient  Follow Up Time:     Francisco Ceja  Family Medicine  [address] [place] [address]  Phone: [phone]  Fax: [phone]  Established Patient  Follow Up Time:    Genaro Thomas  Established Patient  Follow Up Time:     Francisco Ceja  Family Medicine  [facility] [place] [address]  Phone: [phone]  Fax: [phone]  Established Patient  Follow Up Time:     Toby Mei  Pulmonary Disease  [address] [place] [id number]  Phone: [phone]  Fax: [phone]  Follow Up Time: 1 week    Pranay Calixto  Interventional Cardiology  [address] [place] [address], [address]  [place] [address]  Phone: [phone]  Fax: [phone]  Follow Up Time: 1 week   Genaro Thomas  Established Patient  Follow Up Time:     Francisco Ceja  Family Medicine  [facility] [place] [address]  Phone: [phone]  Fax: [phone]  Established Patient  Follow Up Time:     Toby Mei  Pulmonary Disease  [address] [place] [id number]  Phone: [phone]  Fax: [phone]  Follow Up Time: 1 week    Pranay Calixto  Interventional Cardiology  [address] [place] [address], [address]  [place] [address]  Phone: [phone]  Fax: [phone]  Follow Up Time: 1 week

## 2023-12-06 NOTE — DISCHARGE NOTE PROVIDER - NSDCMRMEDTOKEN_GEN_ALL_CORE_FT
aspirin 81 mg oral delayed release capsule: 81 milligram(s) orally once a day  atorvastatin 80 mg oral tablet: 1 tab(s) orally once a day  clopidogrel 75 mg oral tablet: 1 tab(s) orally once a day  Eliquis 5 mg oral tablet: 1 tab(s) orally 2 times a day For pricing at [employer]  enalapril 20 mg oral tablet: 1 tab(s) orally once a day  famotidine 40 mg oral tablet: 1 tab(s) orally 2 times a day  gemfibrozil 600 mg oral tablet: 1 tab(s) orally once a day  glipiZIDE 5 mg oral tablet: 1 tab(s) orally 2 times a day  hydroCHLOROthiazide 25 mg oral tablet: 1 tab(s) orally once a day  metFORMIN 1000 mg oral tablet: 1 tab(s) orally 2 times a day  metoprolol succinate 200 mg oral capsule, extended release: 1 tab(s) orally once a day  NovoLOG Mix 70/30 FlexPen subcutaneous suspension: 33 unit(s) subcutaneous once a day  NovoLOG Mix 70/30 subcutaneous suspension: 22 unit(s) subcutaneous once a day (at bedtime)  pioglitazone 30 mg oral tablet: 1 tab(s) orally once a day  spironolactone 25 mg oral tablet: 1 orally 2 times a day  tamsulosin 0.4 mg oral capsule: 1 orally once a day   aspirin 81 mg oral delayed release capsule: 81 milligram(s) orally once a day  atorvastatin 80 mg oral tablet: 1 tab(s) orally once a day  clopidogrel 75 mg oral tablet: 1 tab(s) orally once a day  Eliquis 5 mg oral tablet: 1 tab(s) orally 2 times a day For pricing at [employer]  enalapril 20 mg oral tablet: 1 tab(s) orally once a day  famotidine 40 mg oral tablet: 1 tab(s) orally 2 times a day  gemfibrozil 600 mg oral tablet: 1 tab(s) orally once a day  glipiZIDE 5 mg oral tablet: 1 tab(s) orally 2 times a day  hydroCHLOROthiazide 25 mg oral tablet: 1 tab(s) orally once a day  metFORMIN 1000 mg oral tablet: 1 tab(s) orally 2 times a day  metoprolol succinate 200 mg oral capsule, extended release: 1 tab(s) orally once a day  NovoLOG Mix 70/30 FlexPen subcutaneous suspension: 33 unit(s) subcutaneous once a day  NovoLOG Mix 70/30 subcutaneous suspension: 22 unit(s) subcutaneous once a day (at bedtime)  pioglitazone 30 mg oral tablet: 1 tab(s) orally once a day  spironolactone 25 mg oral tablet: 1 orally 2 times a day  tamsulosin 0.4 mg oral capsule: 1 orally once a day   aspirin 81 mg oral delayed release capsule: 81 milligram(s) orally once a day  atorvastatin 80 mg oral tablet: 1 tab(s) orally once a day  Eliquis 5 mg oral tablet: 1 tab(s) orally 2 times a day For pricing at [employer]  enalapril 20 mg oral tablet: 1 tab(s) orally once a day  famotidine 40 mg oral tablet: 1 tab(s) orally 2 times a day  gemfibrozil 600 mg oral tablet: 1 tab(s) orally once a day  glipiZIDE 5 mg oral tablet: 1 tab(s) orally 2 times a day  hydroCHLOROthiazide 25 mg oral tablet: 1 tab(s) orally once a day  Lasix 40 mg oral tablet: 1 tab(s) orally once a day  Lopressor 100 mg oral tablet: 1 tab(s) orally once a day  metFORMIN 1000 mg oral tablet: 1 tab(s) orally 2 times a day  NovoLOG Mix 70/30 FlexPen subcutaneous suspension: 33 unit(s) subcutaneous once a day  NovoLOG Mix 70/30 subcutaneous suspension: 22 unit(s) subcutaneous once a day (at bedtime)  pioglitazone 30 mg oral tablet: 1 tab(s) orally once a day  spironolactone 25 mg oral tablet: 1 orally 2 times a day  tamsulosin 0.4 mg oral capsule: 1 orally once a day   aspirin 81 mg oral delayed release capsule: 81 milligram(s) orally once a day  atorvastatin 80 mg oral tablet: 1 tab(s) orally once a day  Chantix 0.5 mg oral tablet: 0.5 tab(s) orally once a day  Eliquis 5 mg oral tablet: 1 tab(s) orally 2 times a day For pricing at [employer]  enalapril 20 mg oral tablet: 1 tab(s) orally once a day  famotidine 40 mg oral tablet: 1 tab(s) orally 2 times a day  gemfibrozil 600 mg oral tablet: 1 tab(s) orally once a day  glipiZIDE 5 mg oral tablet: 1 tab(s) orally 2 times a day  hydroCHLOROthiazide 25 mg oral tablet: 1 tab(s) orally once a day  Lasix 40 mg oral tablet: 1 tab(s) orally once a day  Lopressor 100 mg oral tablet: 1 tab(s) orally once a day  metFORMIN 1000 mg oral tablet: 1 tab(s) orally 2 times a day  NovoLOG Mix 70/30 FlexPen subcutaneous suspension: 33 unit(s) subcutaneous once a day  NovoLOG Mix 70/30 subcutaneous suspension: 22 unit(s) subcutaneous once a day (at bedtime)  pioglitazone 30 mg oral tablet: 1 tab(s) orally once a day  spironolactone 25 mg oral tablet: 1 orally 2 times a day  tamsulosin 0.4 mg oral capsule: 1 orally once a day   aspirin 81 mg oral delayed release capsule: 81 milligram(s) orally once a day  atorvastatin 80 mg oral tablet: 1 tab(s) orally once a day  Chantix 0.5 mg oral tablet: 0.5 tab(s) orally once a day  Eliquis 5 mg oral tablet: 1 tab(s) orally 2 times a day For pricing at [employer]  enalapril 20 mg oral tablet: 1 tab(s) orally once a day  famotidine 40 mg oral tablet: 1 tab(s) orally 2 times a day  gemfibrozil 600 mg oral tablet: 1 tab(s) orally once a day  glipiZIDE 5 mg oral tablet: 1 tab(s) orally 2 times a day  hydroCHLOROthiazide 25 mg oral tablet: 1 tab(s) orally once a day  Lasix 40 mg oral tablet: 1 tab(s) orally once a day  Lopressor 100 mg oral tablet: 1 tab(s) orally once a day  metFORMIN 1000 mg oral tablet: 1 tab(s) orally 2 times a day  NovoLOG Mix 70/30 FlexPen subcutaneous suspension: 33 unit(s) subcutaneous once a day  NovoLOG Mix 70/30 subcutaneous suspension: 22 unit(s) subcutaneous once a day (at bedtime)  pioglitazone 30 mg oral tablet: 1 tab(s) orally once a day  spironolactone 25 mg oral tablet: 1 orally 2 times a day  tamsulosin 0.4 mg oral capsule: 1 orally once a day

## 2023-12-06 NOTE — DISCHARGE NOTE PROVIDER - NSDCCPCAREPLAN_GEN_ALL_CORE_FT
PRINCIPAL DISCHARGE DIAGNOSIS  Diagnosis: Heart failure  Assessment and Plan of Treatment: You were admitted to the hospital for shortness of breath and fluid overload due to heart failure. You were given a diuretic medication, lasix, to improve your shortness of breath and lower leg swelling. While you were here your heart was monitored on telemetry and you were found to have an arrhythmia called atrial fibrilliation. you were started on eliquis and metoprolol to control the heart and prevent clot formation. You were also suspected to have obstructive sleep apnea. Upon discharge you are to:  - continue taking the medications prescribed on this discharge unless otherwise indicated by your primary care physician(s)  - follow up with your primary care physician in 1-2 weeks  - follow up with pulmonology for a sleep study to evaluate you for obstructive sleep apnea  - start using the nicotine patches for smoking cessation aid and speak to your primary care physician about other forms of cessation aid.         SECONDARY DISCHARGE DIAGNOSES  Diagnosis: SOB (shortness of breath)  Assessment and Plan of Treatment:      PRINCIPAL DISCHARGE DIAGNOSIS  Diagnosis: Heart failure  Assessment and Plan of Treatment: You were admitted to the hospital for shortness of breath and fluid overload due to heart failure. You were given a diuretic medication, lasix, to improve your shortness of breath and lower leg swelling. While you were here your heart was monitored on telemetry and you were found to have an arrhythmia called atrial fibrilliation. you were started on eliquis and metoprolol to control the heart and prevent clot formation. You were also suspected to have obstructive sleep apnea. Upon discharge you are to:  - continue taking the medications prescribed on this discharge unless otherwise indicated by your primary care physician(s)  - follow up with your primary care physician in 1-2 weeks  - follow up with pulmonology for a sleep study to evaluate you for obstructive sleep apnea  - start using the nicotine patches for smoking cessation aid and speak to your primary care physician about other forms of cessation aid.         SECONDARY DISCHARGE DIAGNOSES  Diagnosis: Obstructive sleep apnea  Assessment and Plan of Treatment: You have a condition called Obstructive sleep apnea which is cause by increased body weight make your oxygen drop during sleep and waking up fatigued. it also causes hypertension and heart problems. You are advice to stop smoking, lose weight and do sleep study at [facility] with lung doctor     PRINCIPAL DISCHARGE DIAGNOSIS  Diagnosis: Heart failure  Assessment and Plan of Treatment: You were admitted to the hospital for shortness of breath and fluid overload due to heart failure. You were given a diuretic medication, lasix, to improve your shortness of breath and lower leg swelling. While you were here your heart was monitored on telemetry and you were found to have an arrhythmia called atrial fibrilliation. you were started on eliquis and metoprolol to control the heart and prevent clot formation. You were also suspected to have obstructive sleep apnea. Upon discharge you are to:  - continue taking the medications prescribed on this discharge unless otherwise indicated by your primary care physician(s)  - follow up with your primary care physician in 1-2 weeks  - follow up with pulmonology for a sleep study to evaluate you for obstructive sleep apnea  - start using the nicotine patches for smoking cessation aid and speak to your primary care physician about other forms of cessation aid.         SECONDARY DISCHARGE DIAGNOSES  Diagnosis: Obstructive sleep apnea  Assessment and Plan of Treatment: You have a condition called Obstructive sleep apnea which is cause by increased body weight make your oxygen drop during sleep and waking up fatigued. it also causes hypertension and heart problems. You are advice to stop smoking, lose weight and do sleep study at [facility] with lung doctor

## 2023-12-06 NOTE — DISCHARGE NOTE PROVIDER - ATTENDING ATTESTATION STATEMENT
[FreeTextEntry1] : 61 yo male with CKD 3, who recently had an evaluation for right sided flank pain and was found to have kidney cysts. referred for the same \par \par ckd 3- likely related to episodes of uncontrolled hypertension. stable since 2017 with no proteinuria/ hematuria. \par \par renal cyst- unlikely cause of pain. simple cysts.\par \par repeat labs- [facility]
I have personally seen and examined the patient. I have collaborated with and supervised the

## 2023-12-06 NOTE — PROGRESS NOTE ADULT - ASSESSMENT
59 yo M PMHx HTN, DM II, BPH, and CAD s/p PCI, presented to the ED for evaluation of dyspnea on exertion. Patient endorses URI symptoms in last week. Patient endorsed worsening shortness of breath and persistent cough in the last few days. States he is more dyspneic with ambulation, and also more short of breath in the mornings when he first wakes up. Also endorses worsening LE edema recently.       Dyspnea on exertion  Acute Hypoxic Respiratory Failure   - suspected due to acute on chronic HFpEF  - pneumonia suspected on admission but absence of cough, fever, leukocytosis and procalcitonin 0.06 suggests against PNA  - In the ED, pulse oxygenation was 88%, increased to 97% on 3L NC, after receiving albuterol and IV lasix further suggesting chf exacerbation  - RVP negative  - continue off abx and monitor  - c/w IV furosemide ?  - echo showed moderate AS   - . Likely falsely low due to obesity  - remains net negatrive  - history of smoking, patient is agreeable to cessation aid on d/c with chantix or patches    New murmur  - likely from moderate AS  - outpt follow    Sinus pause  - as per pt he was diagnosed with SARIKA by his PMD. Never had formal sleep study test, does not use CPAP or bipap at home  - hold metoprolol  - appreciatepulm eval for bipap recs  - on 12/4  patient had 30 second arrest? EP initially thought arrest-later suspected to be artifact  - MCOT on DC   - case d/w EP who is evaluating this    New onset afib?  - afib RVR noted today  - Eliuqis started-pt aware of risks of bleeding vs benefits  - metoprolol started as per EP-need to uptitrate as RVR  - was previously on toprol 200 mg but had pauses    Suspected sleep apnea  - as per CM patient cannot get home cpap without sleep study  - ABG CO2 40, does not qualify by this  - needs outpt sleep study    HTN  - C/w enalapril, HCTZ,     DLD  - C/w atorvastatin, and gemfibrozil    BPH  - C/w tamsulosin    CAD s/ PCI  - C/w aspirin, Plavix, and atorvastatin 80     DM II  - c/w insulin  - FS controlled    DVT ppx: Lovenox 40 mg qday   57 yo M PMHx HTN, DM II, BPH, and CAD s/p PCI, presented to the ED for evaluation of dyspnea on exertion. Patient endorses URI symptoms in last week. Patient endorsed worsening shortness of breath and persistent cough in the last few days. States he is more dyspneic with ambulation, and also more short of breath in the mornings when he first wakes up. Also endorses worsening LE edema recently.       Dyspnea on exertion  Acute Hypoxic Respiratory Failure   - suspected due to acute on chronic HFpEF  - pneumonia suspected on admission but absence of cough, fever, leukocytosis and procalcitonin 0.06 suggests against PNA  - In the ED, pulse oxygenation was 88%, increased to 97% on 3L NC, after receiving albuterol and IV lasix further suggesting chf exacerbation  - RVP negative  - continue off abx and monitor  - switch to PO lasix  - echo showed moderate AS   - . Likely falsely low due to obesity  - remains net negatrive  - history of smoking, agreeable to cessation aid on d/c with chantix or patches    New murmur  - likely from moderate AS  - outpt follow    Sinus pause  - as per pt he was diagnosed with SARIKA by his PMD. Never had formal sleep study test, does not use CPAP or bipap at home  - restart metoprolol 25 BID  - Dc hydrochlorothiazide  - appreciate pulm eval for CPAP recs  - on 12/4  patient had 30 second arrest EP initially thought arrest-later suspected to be artifact  - MCOT on DC   - case d/w EP who is evaluating this    New onset afib  - afib RVR noted today  - Eliquis started-pt aware of risks of bleeding vs benefits  - metoprolol started as per EP-need to uptitrate as RVR  - was previously on toprol 200 mg but had pauses    Suspected sleep apnea  - as per CM patient cannot get home cpap without sleep study  - ABG CO2 40, does not qualify by this  - needs outpt sleep study    HTN  - C/w enalapril  - d/c HCTZ    DLD  - C/w atorvastatin, and gemfibrozil    BPH  - C/w tamsulosin    CAD s/ PCI  - C/w aspirin, Plavix, and atorvastatin 80     DM II  - c/w insulin  - FS controlled    DVT ppx: Lovenox 40 mg qday

## 2023-12-06 NOTE — DISCHARGE NOTE PROVIDER - NSDCFUADDAPPT_GEN_ALL_CORE_FT
APPTS ARE READY TO BE MADE: [ x] YES    Best Family or Patient Contact (if needed):    Additional Information about above appointments (if needed):    1: CHF  2:   3:     Other comments or requests:    APPTS ARE READY TO BE MADE: [ x] YES    Best Family or Patient Contact (if needed):    Additional Information about above appointments (if needed):    1: CHF  2:   3:     Other comments or requests:     Patient was previously scheduled to see Dr. Mei at on 12/12 at [address] [place] [id number]    Patient was previously scheduled to see Dr. Fofana at 9:40AM on 1/22 at [address] [place] [id number]  APPTS ARE READY TO BE MADE: [ x] YES    Best Family or Patient Contact (if needed):    Additional Information about above appointments (if needed):    1: CHF  2:   3:     Other comments or requests:     Patient was previously scheduled to see Dr. Mei at on 12/12 at [address] [place] [id number]    Patient was previously scheduled to see Dr. Fofana at 9:40AM on 1/22 at [address] [place] [id number]  APPTS ARE READY TO BE MADE: [ x] YES    Best Family or Patient Contact (if needed):    Additional Information about above appointments (if needed):    1: CHF  2:   3:     Other comments or requests:     Patient was previously scheduled to see Dr. Mei at on 12/12 at [address] [place] [address]    Patient was previously scheduled to see Dr. Fofana at 9:40AM on 1/22 at [address] [place] [id number]     Outreached on 12/8, 12/11 and 12/12 which have been unsuccessful. Will await call back from patient/caregiver to coordinate follow up care.  APPTS ARE READY TO BE MADE: [ x] YES    Best Family or Patient Contact (if needed):    Additional Information about above appointments (if needed):    1: CHF  2:   3:     Other comments or requests:     Patient was previously scheduled to see Dr. Mei at on 12/12 at [address] [place] [address]    Patient was previously scheduled to see Dr. Fofana at 9:40AM on 1/22 at [address] [place] [phone]     Outreached on 12/8, 12/11 and 12/12 which have been unsuccessful. Will await call back from patient/caregiver to coordinate follow up care.

## 2023-12-06 NOTE — DISCHARGE NOTE PROVIDER - NSDCFUSCHEDAPPT_GEN_ALL_CORE_FT
Toby Mei  [facility]  PULMMED [address] [place] [address] Av  Scheduled Appointment: 12/12/2023     Toby Mei  [facility]  PULMMED [address] [place] [address] Av  Scheduled Appointment: 12/12/2023     Toby Mei  [facility]  [address]MMED [address]  Scheduled Appointment: 12/12/2023    Bret Fofana  [facility]  [facility] [address]  Scheduled Appointment: 01/22/2024     Toby Mei  [facility]  [address]ED [address]  Scheduled Appointment: 12/12/2023    Bret Fofana  [facility]  [facility] [address]  Scheduled Appointment: 01/22/2024

## 2023-12-06 NOTE — DISCHARGE NOTE PROVIDER - HOSPITAL COURSE
History of Present Illness:   59 yo M patient with a PMH of HTN, diabetes, BPH and CAD s/p PCI, presented to the ED on 12/1/23 with dyspnea on exertion.    Patient reports that over the past week he had been having upper URI, with persistent cough, productive with scant sputum production.  Patient had been also getting more dyspneic on ambulation, partially relieved on rest.  No chest pain fevers/ chills, abdominal pain, nausea/ vomiting, changes in bowel/ urinary habits.    In the ED, the patient was HD stable, afebrile, satting 88% on RA.  Labs were significant for mild transaminitis, a pro-BNP= 964.  VBG showed: pH= 7.38/51/43/30/63.5%.  EKG showed NSR, with non- specific ST changes in the lateral leads, a RVP was negative.  A CXR showed patchy opacity over the right lower lobe with possible volume overload.    Patient received in the ED IV lasix and albuterol, with symptomatic relief after, and he was admitted to tele for management.    Patient was worked up for possible infectious cause of dyspnea, RVP was negative, afebrile, no leukocytosis and normal procal suggested noninfectious etiology. The patient was continued on IV lasix with improvement of dyspnea, oxygenation, edema and pulmonary congestion. The patient underwent TTE on 12/3 that showed moderate aortic stenosis, mild tricuspid regurg and a dilated ascending aorta, diastolic function unable to be assessed at the time. The patient had several sinus pauses over night for which his home toprol 200 mg was held. Tele monitoring showed new onset afib with RVR and episodes of tachycardia. EP evaluated patient and metoprolol titrate 25mg BID was resumed with eliquis 5 mg. EP and pulmonology evaluated him for sinus pauses <5 seconds overnight 2/2 possible SARIKA. Patient was placed on CPAP machine qhs with PEEP 12 in hospital with improvement of sleep and fatigue but was unable to undergo sleep study in patient. an MCOT was placed by EP on day of discharge. HCTZ was held due to hyponatremia. IV lasix was switched to PO lasix 40 mg.     #Dyspnea on exertion  #Acute Hypoxic Respiratory Failure   - suspected due to acute on chronic HFpEF  - pneumonia suspected on admission but absence of cough, fever, leukocytosis and procalcitonin 0.06 suggests against PNA  - In the ED, pulse oxygenation was 88%, increased to 97% on 3L NC, after receiving albuterol and IV lasix further suggesting chf exacerbation  - RVP negative  - continue off abx and monitor  - switch to PO lasix  - echo showed moderate AS   - . Likely falsely low due to obesity  - remains net negative  - history of smoking, agreeable to cessation aid on d/c with chantix or patches  - incentive spirometer      #New murmur  - likely from moderate AS  - outpt follow    #Sinus pause  - as per pt he was diagnosed with SARIKA by his PMD. Never had formal sleep study test, does not use CPAP or bipap at home  - restart metoprolol 25 BID  - appreciate pulm eval for CPAP recs  - on 12/4  patient had 30 second arrest EP initially thought arrest-later suspected to be artifact  - MCOT on DC   - case d/w EP who is evaluating this    #New onset afib  - afib RVR noted today  - Eliquis started-pt aware of risks of bleeding vs benefits  - metoprolol started as per EP-need to uptitrate as RVR  - was previously on toprol 200 mg but had pauses    #Suspected sleep apnea  - as per CM patient cannot get home cpap without sleep study  - ABG CO2 40, does not qualify by this  - needs outpt sleep study    #HTN  - C/w enalapril  - d/c HCTZ for hyponatremia    #DLD  - C/w atorvastatin, and gemfibrozil    #BPH  - C/w tamsulosin    #CAD s/ PCI  - C/w aspirin, Plavix, and atorvastatin 80     #DM II  - c/w insulin  - FS controlled    DVT ppx: Lovenox 40 mg qday       History of Present Illness:   57 yo M patient with a PMH of HTN, diabetes, BPH and CAD s/p PCI, presented to the ED on 12/1/23 with dyspnea on exertion.    Patient reports that over the past week he had been having upper URI, with persistent cough, productive with scant sputum production.  Patient had been also getting more dyspneic on ambulation, partially relieved on rest.  No chest pain fevers/ chills, abdominal pain, nausea/ vomiting, changes in bowel/ urinary habits.    In the ED, the patient was HD stable, afebrile, satting 88% on RA.  Labs were significant for mild transaminitis, a pro-BNP= 964.  VBG showed: pH= 7.38/51/43/30/63.5%.  EKG showed NSR, with non- specific ST changes in the lateral leads, a RVP was negative.  A CXR showed patchy opacity over the right lower lobe with possible volume overload.    Patient received in the ED IV lasix and albuterol, with symptomatic relief after, and he was admitted to tele for management.    Patient was worked up for possible infectious cause of dyspnea, RVP was negative, afebrile, no leukocytosis and normal procal suggested noninfectious etiology. The patient was continued on IV lasix with improvement of dyspnea, oxygenation, edema and pulmonary congestion. The patient underwent TTE on 12/3 that showed moderate aortic stenosis, mild tricuspid regurg and a dilated ascending aorta, diastolic function unable to be assessed at the time. The patient had several sinus pauses over night for which his home toprol 200 mg was held. Tele monitoring showed new onset afib with RVR and episodes of tachycardia. EP evaluated patient and metoprolol titrate 25mg BID was resumed with eliquis 5 mg. EP and pulmonology evaluated him for sinus pauses <5 seconds overnight 2/2 possible SARIKA. Patient was placed on CPAP machine qhs with PEEP 12 in hospital with improvement of sleep and fatigue but was unable to undergo sleep study in patient. an MCOT was placed by EP on day of discharge. HCTZ was held due to hyponatremia. IV lasix was switched to PO lasix 40 mg.     #Dyspnea on exertion  #Acute Hypoxic Respiratory Failure   - suspected due to acute on chronic HFpEF  - pneumonia suspected on admission but absence of cough, fever, leukocytosis and procalcitonin 0.06 suggests against PNA  - In the ED, pulse oxygenation was 88%, increased to 97% on 3L NC, after receiving albuterol and IV lasix further suggesting chf exacerbation  - RVP negative  - continue off abx and monitor  - switch to PO lasix  - echo showed moderate AS   - . Likely falsely low due to obesity  - remains net negative  - history of smoking, agreeable to cessation aid on d/c with chantix or patches  - incentive spirometer      #New murmur  - likely from moderate AS  - outpt follow    #Sinus pause  - as per pt he was diagnosed with SARIKA by his PMD. Never had formal sleep study test, does not use CPAP or bipap at home  - restart metoprolol 25 BID  - appreciate pulm eval for CPAP recs  - on 12/4  patient had 30 second arrest EP initially thought arrest-later suspected to be artifact  - MCOT on DC   - case d/w EP who is evaluating this    #New onset afib  - afib RVR noted today  - Eliquis started-pt aware of risks of bleeding vs benefits  - metoprolol started as per EP-need to uptitrate as RVR  - was previously on toprol 200 mg but had pauses    #Suspected sleep apnea  - as per CM patient cannot get home cpap without sleep study  - ABG CO2 40, does not qualify by this  - needs outpt sleep study    #HTN  - C/w enalapril  - d/c HCTZ for hyponatremia    #DLD  - C/w atorvastatin, and gemfibrozil    #BPH  - C/w tamsulosin    #CAD s/ PCI  - C/w aspirin, Plavix, and atorvastatin 80     #DM II  - c/w insulin  - FS controlled    DVT ppx: Lovenox 40 mg qday       History of Present Illness:   59 yo M patient with a PMH of HTN, diabetes, BPH and CAD s/p PCI, presented to the ED on 12/1/23 with dyspnea on exertion.    Patient reports that over the past week he had been having upper URI, with persistent cough, productive with scant sputum production.  Patient had been also getting more dyspneic on ambulation, partially relieved on rest.  No chest pain fevers/ chills, abdominal pain, nausea/ vomiting, changes in bowel/ urinary habits.    In the ED, the patient was HD stable, afebrile, satting 88% on RA.  Labs were significant for mild transaminitis, a pro-BNP= 964.  VBG showed: pH= 7.38/51/43/30/63.5%.  EKG showed NSR, with non- specific ST changes in the lateral leads, a RVP was negative.  A CXR showed patchy opacity over the right lower lobe with possible volume overload.    Patient received in the ED IV lasix and albuterol, with symptomatic relief after, and he was admitted to tele for management.    Patient was worked up for possible infectious cause of dyspnea, RVP was negative, afebrile, no leukocytosis and normal procal suggested noninfectious etiology. The patient was continued on IV lasix with improvement of dyspnea, oxygenation, edema and pulmonary congestion. The patient underwent TTE on 12/3 that showed moderate aortic stenosis, mild tricuspid regurg and a dilated ascending aorta, diastolic function unable to be assessed at the time. The patient had several sinus pauses over night for which his home toprol 200 mg was held. Tele monitoring showed new onset afib with RVR and episodes of tachycardia. EP evaluated patient and metoprolol titrate 25mg BID was resumed with eliquis 5 mg. EP and pulmonology evaluated him for sinus pauses <5 seconds overnight 2/2 possible SARIKA. Patient was placed on CPAP machine qhs with PEEP 12 in hospital with improvement of sleep and fatigue but was unable to undergo sleep study in patient. an MCOT was placed by EP on day of discharge. HCTZ was held due to hyponatremia. IV lasix was switched to PO lasix 40 mg.     #Dyspnea on exertion  #Acute Hypoxic Respiratory Failure   - suspected due to acute on chronic HFpEF  - pneumonia suspected on admission but absence of cough, fever, leukocytosis and procalcitonin 0.06 suggests against PNA  - In the ED, pulse oxygenation was 88%, increased to 97% on 3L NC, after receiving albuterol and IV lasix further suggesting chf exacerbation  - RVP negative  - continue off abx and monitor  - switch to PO lasix  - echo showed moderate AS   - . Likely falsely low due to obesity  - remains net negative  - history of smoking, agreeable to cessation aid on d/c with chantix or patches  - incentive spirometer      #New murmur  - likely from moderate AS  - outpt follow    #Sinus pause  - as per pt he was diagnosed with SARIKA by his PMD. Never had formal sleep study test, does not use CPAP or bipap at home  - restart metoprolol 25 BID  - appreciate pulm eval for CPAP recs  - on 12/4  patient had 30 second arrest EP initially thought arrest-later suspected to be artifact  - MCOT on DC   - case d/w EP who is evaluating this    #New onset afib  - afib RVR noted today  - Eliquis started-pt aware of risks of bleeding vs benefits  - metoprolol started as per EP-need to uptitrate as RVR  - was previously on toprol 200 mg but had pauses, will DC on toprol 100 pauses resolved on BPAP asymptomatic    #Suspected sleep apnea  - as per CM patient cannot get home cpap without sleep study  - ABG CO2 40, does not qualify by this  - needs outpt sleep study    #HTN  - C/w enalapril  - d/c HCTZ for hyponatremia    #DLD  - C/w atorvastatin, and gemfibrozil    #BPH  - C/w tamsulosin    #CAD s/ PCI  - C/w aspirin, Plavix, and atorvastatin 80     #DM II  - c/w insulin  - FS controlled    DVT ppx: Lovenox 40 mg qday       History of Present Illness:   59 yo M patient with a PMH of HTN, diabetes, BPH and CAD s/p PCI, presented to the ED on 12/1/23 with dyspnea on exertion.    Patient reports that over the past week he had been having upper URI, with persistent cough, productive with scant sputum production.  Patient had been also getting more dyspneic on ambulation, partially relieved on rest.  No chest pain fevers/ chills, abdominal pain, nausea/ vomiting, changes in bowel/ urinary habits.    In the ED, the patient was HD stable, afebrile, satting 88% on RA.  Labs were significant for mild transaminitis, a pro-BNP= 964.  VBG showed: pH= 7.38/51/43/30/63.5%.  EKG showed NSR, with non- specific ST changes in the lateral leads, a RVP was negative.  A CXR showed patchy opacity over the right lower lobe with possible volume overload.    Patient received in the ED IV lasix and albuterol, with symptomatic relief after, and he was admitted to tele for management.    Patient was worked up for possible infectious cause of dyspnea, RVP was negative, afebrile, no leukocytosis and normal procal suggested noninfectious etiology. The patient was continued on IV lasix with improvement of dyspnea, oxygenation, edema and pulmonary congestion. The patient underwent TTE on 12/3 that showed moderate aortic stenosis, mild tricuspid regurg and a dilated ascending aorta, diastolic function unable to be assessed at the time. The patient had several sinus pauses over night for which his home toprol 200 mg was held. Tele monitoring showed new onset afib with RVR and episodes of tachycardia. EP evaluated patient and metoprolol titrate 25mg BID was resumed with eliquis 5 mg. EP and pulmonology evaluated him for sinus pauses <5 seconds overnight 2/2 possible SARIKA. Patient was placed on CPAP machine qhs with PEEP 12 in hospital with improvement of sleep and fatigue but was unable to undergo sleep study in patient. an MCOT was placed by EP on day of discharge. HCTZ was held due to hyponatremia. IV lasix was switched to PO lasix 40 mg.   Pro-Brain Natriuretic Peptide: 964 pg/mL (12.01.23 @ 04:45)   Troponin T, Serum: <0.01 ng/mL (12.01.23 @ 19:58)   EKG Diagnosis Line Atrial fibrillation with rapid ventricular response  Nonspecific ST and T wave abnormality  Abnormal ECG  Echo: Summary:   1. Normal global left ventricular systolic function.   2. LV Ejection Fraction by Spencer's Method with a biplane EF of 70 %.   3. Mildly increased LV wall thickness.   4. The left ventricular diastolic function could not be assessed in this   study.   5. Normal left atrial size.   6. Normal right atrial size.   7. Trace mitral valve regurgitation.   8. Mild tricuspid regurgitation.   9. Sclerotic aortic valve with decreased opening.  10. Moderate aortic stenosis, peak/mean PG 68/26 mmHg, [place] 1.3 cm^2.  11. Dilatation of the ascending aorta.  12. Endocardial visualization was enhanced with intravenous echo contrast.    #Dyspnea on exertion  #Acute Hypoxic Respiratory Failure   -  acute on chronic HFpEF  - pneumonia suspected on admission but absence of cough, fever, leukocytosis and procalcitonin 0.06 suggests against PNA  - In the ED, pulse oxygenation was 88%, increased to 97% on 3L NC, after receiving albuterol and IV lasix further suggesting chf exacerbation  - RVP negative  - continue off abx and monitor  - switch to PO lasix  - echo showed moderate AS   - . Likely falsely low due to obesity  - remains net negative  - history of smoking, agreeable to cessation aid on d/c with chantix or patches  - incentive spirometer      #New murmur  - likely from moderate AS  - outpt follow    #Sinus pause  - as per pt he was diagnosed with SARIKA by his PMD. Never had formal sleep study test, does not use CPAP or bipap at home  - MCOT on DC or at his office with Dr Lam his cardiologist   - case d/w EP    #New onset afib  - Eliquis started-pt aware of risks of bleeding vs benefits  - metoprolol started as per EP-need to uptitrate as RVR to 50 twice today  - was previously on toprol 200 mg but had pauses, will DC on toprol 100 pauses resolved on BPAP asymptomatic    #Suspected sleep apnea  - Patient needs to do Sleep study at [facility]    #HTN  - C/w enalapril  - d/c HCTZ for hyponatremia    #DLD  - C/w atorvastatin, and gemfibrozil    #BPH  - C/w tamsulosin    #CAD s/ PCI  - C/w aspirin, Plavix, and atorvastatin 80     #DM II  - c/w with home TTT       History of Present Illness:   57 yo M patient with a PMH of HTN, diabetes, BPH and CAD s/p PCI, presented to the ED on 12/1/23 with dyspnea on exertion.    Patient reports that over the past week he had been having upper URI, with persistent cough, productive with scant sputum production.  Patient had been also getting more dyspneic on ambulation, partially relieved on rest.  No chest pain fevers/ chills, abdominal pain, nausea/ vomiting, changes in bowel/ urinary habits.    In the ED, the patient was HD stable, afebrile, satting 88% on RA.  Labs were significant for mild transaminitis, a pro-BNP= 964.  VBG showed: pH= 7.38/51/43/30/63.5%.  EKG showed NSR, with non- specific ST changes in the lateral leads, a RVP was negative.  A CXR showed patchy opacity over the right lower lobe with possible volume overload.    Patient received in the ED IV lasix and albuterol, with symptomatic relief after, and he was admitted to tele for management.    Patient was worked up for possible infectious cause of dyspnea, RVP was negative, afebrile, no leukocytosis and normal procal suggested noninfectious etiology. The patient was continued on IV lasix with improvement of dyspnea, oxygenation, edema and pulmonary congestion. The patient underwent TTE on 12/3 that showed moderate aortic stenosis, mild tricuspid regurg and a dilated ascending aorta, diastolic function unable to be assessed at the time. The patient had several sinus pauses over night for which his home toprol 200 mg was held. Tele monitoring showed new onset afib with RVR and episodes of tachycardia. EP evaluated patient and metoprolol titrate 25mg BID was resumed with eliquis 5 mg. EP and pulmonology evaluated him for sinus pauses <5 seconds overnight 2/2 possible SARIKA. Patient was placed on CPAP machine qhs with PEEP 12 in hospital with improvement of sleep and fatigue but was unable to undergo sleep study in patient. an MCOT was placed by EP on day of discharge. HCTZ was held due to hyponatremia. IV lasix was switched to PO lasix 40 mg.   Pro-Brain Natriuretic Peptide: 964 pg/mL (12.01.23 @ 04:45)   Troponin T, Serum: <0.01 ng/mL (12.01.23 @ 19:58)   EKG Diagnosis Line Atrial fibrillation with rapid ventricular response  Nonspecific ST and T wave abnormality  Abnormal ECG  Echo: Summary:   1. Normal global left ventricular systolic function.   2. LV Ejection Fraction by Spencer's Method with a biplane EF of 70 %.   3. Mildly increased LV wall thickness.   4. The left ventricular diastolic function could not be assessed in this   study.   5. Normal left atrial size.   6. Normal right atrial size.   7. Trace mitral valve regurgitation.   8. Mild tricuspid regurgitation.   9. Sclerotic aortic valve with decreased opening.  10. Moderate aortic stenosis, peak/mean PG 68/26 mmHg, [place] 1.3 cm^2.  11. Dilatation of the ascending aorta.  12. Endocardial visualization was enhanced with intravenous echo contrast.    #Dyspnea on exertion  #Acute Hypoxic Respiratory Failure   -  acute on chronic HFpEF  - pneumonia suspected on admission but absence of cough, fever, leukocytosis and procalcitonin 0.06 suggests against PNA  - In the ED, pulse oxygenation was 88%, increased to 97% on 3L NC, after receiving albuterol and IV lasix further suggesting chf exacerbation  - RVP negative  - continue off abx and monitor  - switch to PO lasix  - echo showed moderate AS   - . Likely falsely low due to obesity  - remains net negative  - history of smoking, agreeable to cessation aid on d/c with chantix or patches  - incentive spirometer      #New murmur  - likely from moderate AS  - outpt follow    #Sinus pause  - as per pt he was diagnosed with SARIKA by his PMD. Never had formal sleep study test, does not use CPAP or bipap at home  - MCOT on DC or at his office with Dr Lam his cardiologist   - case d/w EP    #New onset afib  - Eliquis started-pt aware of risks of bleeding vs benefits  - metoprolol started as per EP-need to uptitrate as RVR to 50 twice today  - was previously on toprol 200 mg but had pauses, will DC on toprol 100 pauses resolved on BPAP asymptomatic    #Suspected sleep apnea  - Patient needs to do Sleep study at [facility]    #HTN  - C/w enalapril  - d/c HCTZ for hyponatremia    #DLD  - C/w atorvastatin, and gemfibrozil    #BPH  - C/w tamsulosin    #CAD s/ PCI  - C/w aspirin, Plavix, and atorvastatin 80     #DM II  - c/w with home TTT       History of Present Illness:   57 yo M patient with a PMH of HTN, diabetes, BPH and CAD s/p PCI, presented to the ED on 12/1/23 with dyspnea on exertion.    Patient reports that over the past week he had been having upper URI, with persistent cough, productive with scant sputum production.  Patient had been also getting more dyspneic on ambulation, partially relieved on rest.  No chest pain fevers/ chills, abdominal pain, nausea/ vomiting, changes in bowel/ urinary habits.    In the ED, the patient was HD stable, afebrile, satting 88% on RA.  Labs were significant for mild transaminitis, a pro-BNP= 964.  VBG showed: pH= 7.38/51/43/30/63.5%.  EKG showed NSR, with non- specific ST changes in the lateral leads, a RVP was negative.  A CXR showed patchy opacity over the right lower lobe with possible volume overload.    Patient received in the ED IV lasix and albuterol, with symptomatic relief after, and he was admitted to tele for management.    Patient was worked up for possible infectious cause of dyspnea, RVP was negative, afebrile, no leukocytosis and normal procal suggested noninfectious etiology. The patient was continued on IV lasix with improvement of dyspnea, oxygenation, edema and pulmonary congestion. The patient underwent TTE on 12/3 that showed moderate aortic stenosis, mild tricuspid regurg and a dilated ascending aorta, diastolic function unable to be assessed at the time. The patient had several sinus pauses over night for which his home toprol 200 mg was held. Tele monitoring showed new onset afib with RVR and episodes of tachycardia. EP evaluated patient and metoprolol titrate 25mg BID was resumed with eliquis 5 mg. EP and pulmonology evaluated him for sinus pauses <5 seconds overnight 2/2 possible SARIKA. Patient was placed on CPAP machine qhs with PEEP 12 in hospital with improvement of sleep and fatigue but was unable to undergo sleep study in patient. an MCOT was placed by EP on day of discharge. HCTZ was held due to hyponatremia. IV lasix was switched to PO lasix 40 mg.   Pro-Brain Natriuretic Peptide: 964 pg/mL (12.01.23 @ 04:45)   Troponin T, Serum: <0.01 ng/mL (12.01.23 @ 19:58)   EKG Diagnosis Line Atrial fibrillation with rapid ventricular response  Nonspecific ST and T wave abnormality  Abnormal ECG  Echo: Summary:   1. Normal global left ventricular systolic function.   2. LV Ejection Fraction by Spencer's Method with a biplane EF of 70 %.   3. Mildly increased LV wall thickness.   4. The left ventricular diastolic function could not be assessed in this   study.   5. Normal left atrial size.   6. Normal right atrial size.   7. Trace mitral valve regurgitation.   8. Mild tricuspid regurgitation.   9. Sclerotic aortic valve with decreased opening.  10. Moderate aortic stenosis, peak/mean PG 68/26 mmHg, [place] 1.3 cm^2.  11. Dilatation of the ascending aorta.  12. Endocardial visualization was enhanced with intravenous echo contrast.    #Dyspnea on exertion  #Acute Hypoxic Respiratory Failure   -  acute on chronic HFpEF  - pneumonia suspected on admission but absence of cough, fever, leukocytosis and procalcitonin 0.06 suggests against PNA  - In the ED, pulse oxygenation was 88%, increased to 97% on 3L NC, after receiving albuterol and IV lasix further suggesting chf exacerbation  - RVP negative  - continue off abx and monitor  - switch to PO lasix  - echo showed moderate AS   - . Likely falsely low due to obesity  - remains net negative  - history of smoking, agreeable to cessation aid on d/c with chantix or patches  - incentive spirometer    w    #Sinus pause  - as per pt he was diagnosed with SARIKA by his PMD. Never had formal sleep study test, does not use CPAP or bipap at home  - MCOT on DC or at his office with Dr Lam his cardiologist   - case d/w EP    #New onset afib  - Eliquis started-pt aware of risks of bleeding vs benefits  - metoprolol started as per EP-need to uptitrate as RVR to 50 twice today  - was previously on toprol 200 mg but had pauses, will DC on toprol 100 pauses resolved on BPAP asymptomatic    #Suspected sleep apnea  - Patient needs to do Sleep study at [facility]    #HTN  - C/w enalapril  - d/c HCTZ for hyponatremia    #DLD  - C/w atorvastatin, and gemfibrozil    #BPH  - C/w tamsulosin    #CAD s/ PCI  - C/w aspirin, Plavix, and atorvastatin 80     #DM II  - c/w with home TTT

## 2023-12-06 NOTE — DISCHARGE NOTE PROVIDER - PROVIDER TOKENS
PROVIDER:[TOKEN:[[id number]],ESTABLISHEDPATIENT:[T]],PROVIDER:[TOKEN:[[id number]],ESTABLISHEDPATIENT:[T]] PROVIDER:[TOKEN:[[id number]],ESTABLISHEDPATIENT:[T]],PROVIDER:[TOKEN:[[id number]],ESTABLISHEDPATIENT:[T]] PROVIDER:[TOKEN:[[id number]],ESTABLISHEDPATIENT:[T]],PROVIDER:[TOKEN:[[id number]],ESTABLISHEDPATIENT:[T]],PROVIDER:[TOKEN:[[id number]],FOLLOWUP:[1 week]],PROVIDER:[TOKEN:[[id number]],FOLLOWUP:[1 week]] PROVIDER:[TOKEN:[[id number]],ESTABLISHEDPATIENT:[T]],PROVIDER:[TOKEN:[[id number]],ESTABLISHEDPATIENT:[T]],PROVIDER:[TOKEN:[[id number]],FOLLOWUP:[1 week]],PROVIDER:[TOKEN:[[id number]],FOLLOWUP:[1 week]]

## 2023-12-07 VITALS — HEART RATE: 102 BPM | DIASTOLIC BLOOD PRESSURE: 63 MMHG | SYSTOLIC BLOOD PRESSURE: 119 MMHG

## 2023-12-07 LAB
ALBUMIN SERPL ELPH-MCNC: 3.9 G/DL — SIGNIFICANT CHANGE UP (ref 3.5–5.2)
ALBUMIN SERPL ELPH-MCNC: 3.9 G/DL — SIGNIFICANT CHANGE UP (ref 3.5–5.2)
ALP SERPL-CCNC: 64 U/L — SIGNIFICANT CHANGE UP (ref 30–115)
ALP SERPL-CCNC: 64 U/L — SIGNIFICANT CHANGE UP (ref 30–115)
ALT FLD-CCNC: 46 U/L — HIGH (ref 0–41)
ALT FLD-CCNC: 46 U/L — HIGH (ref 0–41)
ANION GAP SERPL CALC-SCNC: 12 MMOL/L — SIGNIFICANT CHANGE UP (ref 7–14)
ANION GAP SERPL CALC-SCNC: 12 MMOL/L — SIGNIFICANT CHANGE UP (ref 7–14)
AST SERPL-CCNC: 29 U/L — SIGNIFICANT CHANGE UP (ref 0–41)
AST SERPL-CCNC: 29 U/L — SIGNIFICANT CHANGE UP (ref 0–41)
BASOPHILS # BLD AUTO: 0.02 K/UL — SIGNIFICANT CHANGE UP (ref 0–0.2)
BASOPHILS # BLD AUTO: 0.02 K/UL — SIGNIFICANT CHANGE UP (ref 0–0.2)
BASOPHILS NFR BLD AUTO: 0.4 % — SIGNIFICANT CHANGE UP (ref 0–1)
BASOPHILS NFR BLD AUTO: 0.4 % — SIGNIFICANT CHANGE UP (ref 0–1)
BILIRUB SERPL-MCNC: 0.5 MG/DL — SIGNIFICANT CHANGE UP (ref 0.2–1.2)
BILIRUB SERPL-MCNC: 0.5 MG/DL — SIGNIFICANT CHANGE UP (ref 0.2–1.2)
BUN SERPL-MCNC: 22 MG/DL — HIGH (ref 10–20)
BUN SERPL-MCNC: 22 MG/DL — HIGH (ref 10–20)
CALCIUM SERPL-MCNC: 9.3 MG/DL — SIGNIFICANT CHANGE UP (ref 8.4–10.5)
CALCIUM SERPL-MCNC: 9.3 MG/DL — SIGNIFICANT CHANGE UP (ref 8.4–10.5)
CHLORIDE SERPL-SCNC: 94 MMOL/L — LOW (ref 98–110)
CHLORIDE SERPL-SCNC: 94 MMOL/L — LOW (ref 98–110)
CO2 SERPL-SCNC: 26 MMOL/L — SIGNIFICANT CHANGE UP (ref 17–32)
CO2 SERPL-SCNC: 26 MMOL/L — SIGNIFICANT CHANGE UP (ref 17–32)
CREAT SERPL-MCNC: 1 MG/DL — SIGNIFICANT CHANGE UP (ref 0.7–1.5)
CREAT SERPL-MCNC: 1 MG/DL — SIGNIFICANT CHANGE UP (ref 0.7–1.5)
EGFR: 86 ML/MIN/1.73M2 — SIGNIFICANT CHANGE UP
EGFR: 86 ML/MIN/1.73M2 — SIGNIFICANT CHANGE UP
EOSINOPHIL # BLD AUTO: 0.22 K/UL — SIGNIFICANT CHANGE UP (ref 0–0.7)
EOSINOPHIL # BLD AUTO: 0.22 K/UL — SIGNIFICANT CHANGE UP (ref 0–0.7)
EOSINOPHIL NFR BLD AUTO: 4.9 % — SIGNIFICANT CHANGE UP (ref 0–8)
EOSINOPHIL NFR BLD AUTO: 4.9 % — SIGNIFICANT CHANGE UP (ref 0–8)
GLUCOSE BLDC GLUCOMTR-MCNC: 145 MG/DL — HIGH (ref 70–99)
GLUCOSE BLDC GLUCOMTR-MCNC: 145 MG/DL — HIGH (ref 70–99)
GLUCOSE BLDC GLUCOMTR-MCNC: 196 MG/DL — HIGH (ref 70–99)
GLUCOSE BLDC GLUCOMTR-MCNC: 196 MG/DL — HIGH (ref 70–99)
GLUCOSE BLDC GLUCOMTR-MCNC: 229 MG/DL — HIGH (ref 70–99)
GLUCOSE BLDC GLUCOMTR-MCNC: 229 MG/DL — HIGH (ref 70–99)
GLUCOSE SERPL-MCNC: 204 MG/DL — HIGH (ref 70–99)
GLUCOSE SERPL-MCNC: 204 MG/DL — HIGH (ref 70–99)
HCT VFR BLD CALC: 37.5 % — LOW (ref 42–52)
HCT VFR BLD CALC: 37.5 % — LOW (ref 42–52)
HGB BLD-MCNC: 12.2 G/DL — LOW (ref 14–18)
HGB BLD-MCNC: 12.2 G/DL — LOW (ref 14–18)
IMM GRANULOCYTES NFR BLD AUTO: 0.2 % — SIGNIFICANT CHANGE UP (ref 0.1–0.3)
IMM GRANULOCYTES NFR BLD AUTO: 0.2 % — SIGNIFICANT CHANGE UP (ref 0.1–0.3)
LYMPHOCYTES # BLD AUTO: 1.56 K/UL — SIGNIFICANT CHANGE UP (ref 1.2–3.4)
LYMPHOCYTES # BLD AUTO: 1.56 K/UL — SIGNIFICANT CHANGE UP (ref 1.2–3.4)
LYMPHOCYTES # BLD AUTO: 35 % — SIGNIFICANT CHANGE UP (ref 20.5–51.1)
LYMPHOCYTES # BLD AUTO: 35 % — SIGNIFICANT CHANGE UP (ref 20.5–51.1)
MAGNESIUM SERPL-MCNC: 2.1 MG/DL — SIGNIFICANT CHANGE UP (ref 1.8–2.4)
MAGNESIUM SERPL-MCNC: 2.1 MG/DL — SIGNIFICANT CHANGE UP (ref 1.8–2.4)
MCHC RBC-ENTMCNC: 27.1 PG — SIGNIFICANT CHANGE UP (ref 27–31)
MCHC RBC-ENTMCNC: 27.1 PG — SIGNIFICANT CHANGE UP (ref 27–31)
MCHC RBC-ENTMCNC: 32.5 G/DL — SIGNIFICANT CHANGE UP (ref 32–37)
MCHC RBC-ENTMCNC: 32.5 G/DL — SIGNIFICANT CHANGE UP (ref 32–37)
MCV RBC AUTO: 83.1 FL — SIGNIFICANT CHANGE UP (ref 80–94)
MCV RBC AUTO: 83.1 FL — SIGNIFICANT CHANGE UP (ref 80–94)
MONOCYTES # BLD AUTO: 0.56 K/UL — SIGNIFICANT CHANGE UP (ref 0.1–0.6)
MONOCYTES # BLD AUTO: 0.56 K/UL — SIGNIFICANT CHANGE UP (ref 0.1–0.6)
MONOCYTES NFR BLD AUTO: 12.6 % — HIGH (ref 1.7–9.3)
MONOCYTES NFR BLD AUTO: 12.6 % — HIGH (ref 1.7–9.3)
NEUTROPHILS # BLD AUTO: 2.09 K/UL — SIGNIFICANT CHANGE UP (ref 1.4–6.5)
NEUTROPHILS # BLD AUTO: 2.09 K/UL — SIGNIFICANT CHANGE UP (ref 1.4–6.5)
NEUTROPHILS NFR BLD AUTO: 46.9 % — SIGNIFICANT CHANGE UP (ref 42.2–75.2)
NEUTROPHILS NFR BLD AUTO: 46.9 % — SIGNIFICANT CHANGE UP (ref 42.2–75.2)
NRBC # BLD: 0 /100 WBCS — SIGNIFICANT CHANGE UP (ref 0–0)
NRBC # BLD: 0 /100 WBCS — SIGNIFICANT CHANGE UP (ref 0–0)
PLATELET # BLD AUTO: 217 K/UL — SIGNIFICANT CHANGE UP (ref 130–400)
PLATELET # BLD AUTO: 217 K/UL — SIGNIFICANT CHANGE UP (ref 130–400)
PMV BLD: 9.8 FL — SIGNIFICANT CHANGE UP (ref 7.4–10.4)
PMV BLD: 9.8 FL — SIGNIFICANT CHANGE UP (ref 7.4–10.4)
POTASSIUM SERPL-MCNC: 4.2 MMOL/L — SIGNIFICANT CHANGE UP (ref 3.5–5)
POTASSIUM SERPL-MCNC: 4.2 MMOL/L — SIGNIFICANT CHANGE UP (ref 3.5–5)
POTASSIUM SERPL-SCNC: 4.2 MMOL/L — SIGNIFICANT CHANGE UP (ref 3.5–5)
POTASSIUM SERPL-SCNC: 4.2 MMOL/L — SIGNIFICANT CHANGE UP (ref 3.5–5)
PROT SERPL-MCNC: 6.5 G/DL — SIGNIFICANT CHANGE UP (ref 6–8)
PROT SERPL-MCNC: 6.5 G/DL — SIGNIFICANT CHANGE UP (ref 6–8)
RBC # BLD: 4.51 M/UL — LOW (ref 4.7–6.1)
RBC # BLD: 4.51 M/UL — LOW (ref 4.7–6.1)
RBC # FLD: 14.9 % — HIGH (ref 11.5–14.5)
RBC # FLD: 14.9 % — HIGH (ref 11.5–14.5)
SODIUM SERPL-SCNC: 132 MMOL/L — LOW (ref 135–146)
SODIUM SERPL-SCNC: 132 MMOL/L — LOW (ref 135–146)
WBC # BLD: 4.46 K/UL — LOW (ref 4.8–10.8)
WBC # BLD: 4.46 K/UL — LOW (ref 4.8–10.8)
WBC # FLD AUTO: 4.46 K/UL — LOW (ref 4.8–10.8)
WBC # FLD AUTO: 4.46 K/UL — LOW (ref 4.8–10.8)

## 2023-12-07 PROCEDURE — 99239 HOSP IP/OBS DSCHRG MGMT >30: CPT | Mod: GC

## 2023-12-07 RX ORDER — METOPROLOL TARTRATE 50 MG
1 TABLET ORAL
Qty: 30 | Refills: 1
Start: 2023-12-07 | End: 2024-02-04

## 2023-12-07 RX ORDER — FUROSEMIDE 40 MG
1 TABLET ORAL
Qty: 30 | Refills: 1
Start: 2023-12-07 | End: 2024-02-04

## 2023-12-07 RX ADMIN — FAMOTIDINE 40 MILLIGRAM(S): 10 INJECTION INTRAVENOUS at 17:08

## 2023-12-07 RX ADMIN — Medication 2: at 11:49

## 2023-12-07 RX ADMIN — FAMOTIDINE 40 MILLIGRAM(S): 10 INJECTION INTRAVENOUS at 06:09

## 2023-12-07 RX ADMIN — Medication 7 UNIT(S): at 17:09

## 2023-12-07 RX ADMIN — CLOPIDOGREL BISULFATE 75 MILLIGRAM(S): 75 TABLET, FILM COATED ORAL at 11:32

## 2023-12-07 RX ADMIN — Medication 50 MILLIGRAM(S): at 06:10

## 2023-12-07 RX ADMIN — Medication 50 MILLIGRAM(S): at 17:08

## 2023-12-07 RX ADMIN — Medication 20 MILLIGRAM(S): at 06:11

## 2023-12-07 RX ADMIN — SPIRONOLACTONE 25 MILLIGRAM(S): 25 TABLET, FILM COATED ORAL at 06:13

## 2023-12-07 RX ADMIN — Medication 7 UNIT(S): at 11:49

## 2023-12-07 RX ADMIN — Medication 40 MILLIGRAM(S): at 06:11

## 2023-12-07 RX ADMIN — APIXABAN 5 MILLIGRAM(S): 2.5 TABLET, FILM COATED ORAL at 06:12

## 2023-12-07 RX ADMIN — Medication 7 UNIT(S): at 08:19

## 2023-12-07 RX ADMIN — Medication 600 MILLIGRAM(S): at 06:13

## 2023-12-07 RX ADMIN — Medication 600 MILLIGRAM(S): at 17:06

## 2023-12-07 RX ADMIN — APIXABAN 5 MILLIGRAM(S): 2.5 TABLET, FILM COATED ORAL at 17:07

## 2023-12-07 RX ADMIN — Medication 1: at 08:20

## 2023-12-07 RX ADMIN — Medication 3 MILLILITER(S): at 08:46

## 2023-12-07 NOTE — PROGRESS NOTE ADULT - ASSESSMENT
59 yo M PMHx HTN, DM II, BPH, and CAD s/p PCI, presented to the ED for evaluation of dyspnea on exertion. Patient endorses URI symptoms in last week. Patient endorsed worsening shortness of breath and persistent cough in the last few days. States he is more dyspneic with ambulation, and also more short of breath in the mornings when he first wakes up. Also endorses worsening LE edema recently.     A/P:   Acute Hypoxic Respiratory Failure   Acute on chronic HFpEF  Patient with SOb, leg edema. Pro- (morbidly obese).   CXR showed pulmonary congestion.   Echo showed LVEF 70%, mild TR, mod AS.   s/p Lasix 40mg IV, switch to Lasix 40mg po daily.   Low sodium diet, fluid restriction.     Sinus pause  Episode of sinus pause for 32.5 on 12/2/23  Seen by EP, likely from sleep apnea, recommended sleep study and CPAP.   Metoprolol dose reduced, MCOT on discharge, follow up outpatient       New Paroxysmal Atrial Fibrillation with Rapid Ventricular Response:   Metoprolol resumed, increase the dose to 50mg BID, discharge on Toprol 100mg daily.   He was on Toprol 200mg daily (reduced due to episode of sinus pause).   Started on Eliquis.     Suspected sleep apnea  Morbid Obesity:   Pulmonary follow up outpatient for sleep study, will benefit from CPAP if diagnosis confirmed.   Advised with weight loss, increase activity, low calories diet.     CAD s/ PCI  Continue aspirin, Plavix, and atorvastatin 80     HTN: enalapril, HCTZ,   DLD: atorvastatin, and gemfibrozil  DM II:  BPH: tamsulosin    DVT ppx: Lovenox 40 mg qday    Pt is high risk    #Progress Note Handoff:  Pending (specify):  HR control,   Family discussion: As above with patient  Disposition: Home.         57 yo M PMHx HTN, DM II, BPH, and CAD s/p PCI, presented to the ED for evaluation of dyspnea on exertion. Patient endorses URI symptoms in last week. Patient endorsed worsening shortness of breath and persistent cough in the last few days. States he is more dyspneic with ambulation, and also more short of breath in the mornings when he first wakes up. Also endorses worsening LE edema recently.     A/P:   Acute Hypoxic Respiratory Failure   Acute on chronic HFpEF  Patient with SOb, leg edema. Pro- (morbidly obese).   CXR showed pulmonary congestion.   Echo showed LVEF 70%, mild TR, mod AS.   s/p Lasix 40mg IV, switch to Lasix 40mg po daily.   Low sodium diet, fluid restriction.     Sinus pause  Episode of sinus pause for 32.5 on 12/2/23  Seen by EP, likely from sleep apnea, recommended sleep study and CPAP.   Metoprolol dose reduced, MCOT on discharge, follow up outpatient       New Paroxysmal Atrial Fibrillation with Rapid Ventricular Response:   Metoprolol resumed, increase the dose to 50mg BID, discharge on Toprol 100mg daily.   He was on Toprol 200mg daily (reduced due to episode of sinus pause).   Started on Eliquis.     Suspected sleep apnea  Morbid Obesity:   Pulmonary follow up outpatient for sleep study, will benefit from CPAP if diagnosis confirmed.   Advised with weight loss, increase activity, low calories diet.     CAD s/ PCI  Continue aspirin, Plavix, and atorvastatin 80     HTN: enalapril, HCTZ,   DLD: atorvastatin, and gemfibrozil  DM II:  BPH: tamsulosin    DVT ppx: Lovenox 40 mg qday    Pt is high risk    #Progress Note Handoff:  Pending (specify):  HR control,   Family discussion: As above with patient  Disposition: Home.

## 2023-12-07 NOTE — CHART NOTE - NSCHARTNOTEFT_GEN_A_CORE
Electrophysiology PA Note    Event monitor [employer] placed on the patient for 30 days  Bedside teaching provided to patient   Follow up with Dr Fofana    in 6 weeks  [address] [place] [address], [address]  [phone]       Instructions for use:  - Patch placed on left chest wall  - Take patch off every 7 days and remove the white sensor. Throw the patch away and place the sensor on the  until fully charged.  - After sensor charged, attach to a new patch and place on left side of chest. Repeat this EVERY 7 DAYS  - Event monitor comes with phone monitor to assess for any patient symptoms. It is touch screen and needs to be charged EVERY night. If patient has any symptoms follow the prompts on the phone which will send to the office. If no symptoms, phone does not need to be used.  - Patient is to wear MCOT for 30 days,   - When patch is removed, place everything back in the box and use included the pre-paid UPS envelop to send back to the company.  - All chargers and patches can be found in the second small box inside the big box  - Call company, [employer], for more supplies  - For any questions regarding use can call the office [phone] Electrophysiology PA Note    Event monitor [employer] placed on the patient for 30 days  Bedside teaching provided to patient   Follow up with Dr Fofana    in 6 weeks  [address] [place] [address], [address]  [phone]       Instructions for use:  - Patch placed on left chest wall  - Take patch off every 7 days and remove the white sensor. Throw the patch away and place the sensor on the  until fully charged.  - After sensor charged, attach to a new patch and place on left side of chest. Repeat this EVERY 7 DAYS  - Event monitor comes with phone monitor to assess for any patient symptoms. It is touch screen and needs to be charged EVERY night. If patient has any symptoms follow the prompts on the phone which will send to the office. If no symptoms, phone does not need to be used.  - Patient is to wear MCOT for 30 days,   - When patch is removed, place everything back in the box and use included the pre-paid UPS envelop to send back to the company.  - All chargers and patches can be found in the second small box inside the big box  - Call company, [employer], for more supplies  - For any questions regarding use can call the office [phone]

## 2023-12-07 NOTE — PROGRESS NOTE ADULT - SUBJECTIVE AND OBJECTIVE BOX
24H events:    Patient is a 58y old Male who presents with a chief complaint of Shortness of breath (05 Dec 2023 15:26)    Primary diagnosis of Heart failure      Today is hospital day 5d. This morning patient was seen and examined at bedside, resting comfortably in bed.    No acute or major events overnight. Patient states markedly improved sleep last night with use of CPAP and less fatigue this morning.     Admits to moderate cough producing clear sputum, mild dyspnea on exertion, denies chest pain at rest and on exertion. Voiding normally with appropriate bowel movements.    Code Status: full code    Family communication:  Contact date:  Name of person contacted:  Relationship to patient:  Communication details:  What matters most:    PAST MEDICAL & SURGICAL HISTORY  CAD S/P percutaneous coronary angioplasty    DM (diabetes mellitus)    Hyperlipidemia    HTN (hypertension)      SOCIAL HISTORY:  Social History:      ALLERGIES:  No Known Allergies    MEDICATIONS:  STANDING MEDICATIONS  albuterol/ipratropium for Nebulization 3 milliLiter(s) Nebulizer every 6 hours  apixaban 5 milliGRAM(s) Oral every 12 hours  atorvastatin 80 milliGRAM(s) Oral at bedtime  clopidogrel Tablet 75 milliGRAM(s) Oral daily  dextrose 5%. 1000 milliLiter(s) IV Continuous <Continuous>  dextrose 5%. 1000 milliLiter(s) IV Continuous <Continuous>  dextrose 50% Injectable 25 Gram(s) IV Push once  dextrose 50% Injectable 25 Gram(s) IV Push once  dextrose 50% Injectable 12.5 Gram(s) IV Push once  enalapril 20 milliGRAM(s) Oral daily  famotidine    Tablet 40 milliGRAM(s) Oral two times a day  furosemide   Injectable 40 milliGRAM(s) IV Push daily  gemfibrozil 600 milliGRAM(s) Oral two times a day  glucagon  Injectable 1 milliGRAM(s) IntraMuscular once  hydrochlorothiazide 25 milliGRAM(s) Oral daily  insulin glargine Injectable (LANTUS) 35 Unit(s) SubCutaneous at bedtime  insulin lispro (ADMELOG) corrective regimen sliding scale   SubCutaneous three times a day before meals  insulin lispro Injectable (ADMELOG) 7 Unit(s) SubCutaneous three times a day before meals  metoprolol tartrate 25 milliGRAM(s) Oral two times a day  spironolactone 25 milliGRAM(s) Oral daily  tamsulosin 0.4 milliGRAM(s) Oral at bedtime    PRN MEDICATIONS  dextrose Oral Gel 15 Gram(s) Oral once PRN    VITALS:   T(F): 96.8  HR: 82  BP: 131/63  RR: 18  SpO2: 97%    PHYSICAL EXAM:   GENERAL: NAD, lying in bed comfortably  HEAD:  Atraumatic, Normocephalic  EYES: EOMI, sclera clear  ENT: Moist mucous membranes  NECK: Supple, trachea midline  CHEST/LUNG: Clear to auscultation anteriorly bilaterally; mildly decreased breath sounds b/l Unlabored respirations  HEART: Regular rate and rhythm; No appreciable murmurs, rubs, or gallops  ABDOMEN: (+)BS; Soft, nontender, nondistended  EXTREMITIES:  1+ pitting edema. No clubbing or cyanosis  NERVOUS SYSTEM:  A&Ox3, no noted facial droop. Moving all extremities w/o difficulty.   SKIN: mild b/l LE stasis dermatitis.     (  ) Indwelling Young Catheter:   Date insterted:    Reason (  ) Critical illness     (  ) urinary retention    (  ) Accurate Ins/Outs Monitoring     (  ) CMO patient    (  ) Central Line:   Date inserted:  Location: (  ) Right IJ     (  ) Left IJ     (  ) Right Fem     (  ) Left Fem    (  ) SPC        (  ) pigtail       (  ) PEG tube       (  ) colostomy       (  ) jejunostomy  (  ) U-Dall    LABS:                        12.9   4.47  )-----------( 216      ( 05 Dec 2023 05:43 )             40.4     12-05    129<L>  |  94<L>  |  18  ----------------------------<  206<H>  4.3   |  26  |  1.0    Ca    9.0      05 Dec 2023 05:43  Mg     2.0     12-05    TPro  6.4  /  Alb  3.9  /  TBili  0.5  /  DBili  x   /  AST  17  /  ALT  27  /  AlkPhos  64  12-05      Urinalysis Basic - ( 05 Dec 2023 05:43 )    Color: x / Appearance: x / SG: x / pH: x  Gluc: 206 mg/dL / Ketone: x  / Bili: x / Urobili: x   Blood: x / Protein: x / Nitrite: x   Leuk Esterase: x / RBC: x / WBC x   Sq Epi: x / Non Sq Epi: x / Bacteria: x      ABG - ( 05 Dec 2023 15:05 )  pH, Arterial: 7.47  pH, Blood: x     /  pCO2: 40    /  pO2: 94    / HCO3: 29    / Base Excess: 5.0   /  SaO2: 98.6                      RADIOLOGY:  < from: Xray Chest 1 View- PORTABLE-Routine (Xray Chest 1 View- PORTABLE-Routine .) (12.04.23 @ 15:48) >  Findings/  impression:    Support devices: None    Cardiac/ Mediastinum: unremarkable    Lungs/ Pleura: Diminishing opacities. No pneumothorax    Skeletal/ soft tissues: Stable      --- End of Report ---      CLAIRE HERNANDEZ MD; Attending Radiologist    < end of copied text >          
CHIEF COMPLAINT:    Patient is a 58y old  Male who presents with a chief complaint of Shortness of breath     INTERVAL HPI/OVERNIGHT EVENTS:    Patient seen and examined at bedside. No acute overnight events occurred.    ROS: Denies SOB, chest pain. All other systems are negative.    Medications:  Standing  albuterol/ipratropium for Nebulization 3 milliLiter(s) Nebulizer every 6 hours  apixaban 5 milliGRAM(s) Oral every 12 hours  atorvastatin 80 milliGRAM(s) Oral at bedtime  clopidogrel Tablet 75 milliGRAM(s) Oral daily  dextrose 5%. 1000 milliLiter(s) IV Continuous <Continuous>  dextrose 5%. 1000 milliLiter(s) IV Continuous <Continuous>  dextrose 50% Injectable 25 Gram(s) IV Push once  dextrose 50% Injectable 25 Gram(s) IV Push once  dextrose 50% Injectable 12.5 Gram(s) IV Push once  enalapril 20 milliGRAM(s) Oral daily  famotidine    Tablet 40 milliGRAM(s) Oral two times a day  furosemide   Injectable 40 milliGRAM(s) IV Push daily  gemfibrozil 600 milliGRAM(s) Oral two times a day  glucagon  Injectable 1 milliGRAM(s) IntraMuscular once  hydrochlorothiazide 25 milliGRAM(s) Oral daily  insulin glargine Injectable (LANTUS) 35 Unit(s) SubCutaneous at bedtime  insulin lispro (ADMELOG) corrective regimen sliding scale   SubCutaneous three times a day before meals  insulin lispro Injectable (ADMELOG) 7 Unit(s) SubCutaneous three times a day before meals  metoprolol tartrate 25 milliGRAM(s) Oral two times a day  spironolactone 25 milliGRAM(s) Oral daily  tamsulosin 0.4 milliGRAM(s) Oral at bedtime    PRN Meds  dextrose Oral Gel 15 Gram(s) Oral once PRN        Vital Signs:    T(F): 96 (12-05-23 @ 13:03), Max: 98.5 (12-05-23 @ 04:05)  HR: 75 (12-05-23 @ 13:03) (75 - 99)  BP: 769/82 (12-05-23 @ 13:03) (100/58 - 769/82)  RR: 18 (12-05-23 @ 13:03) (18 - 18)  SpO2: --  I&O's Summary    04 Dec 2023 07:01  -  05 Dec 2023 07:00  --------------------------------------------------------  IN: 985 mL / OUT: 2850 mL / NET: -1865 mL    05 Dec 2023 07:01  -  05 Dec 2023 15:27  --------------------------------------------------------  IN: 690 mL / OUT: 1500 mL / NET: -810 mL      Daily     Daily   CAPILLARY BLOOD GLUCOSE      POCT Blood Glucose.: 250 mg/dL (05 Dec 2023 11:31)  POCT Blood Glucose.: 206 mg/dL (05 Dec 2023 07:31)  POCT Blood Glucose.: 243 mg/dL (04 Dec 2023 21:22)  POCT Blood Glucose.: 222 mg/dL (04 Dec 2023 16:21)      PHYSICAL EXAM:  GENERAL:  NAD, obese  SKIN: No rashes or lesions  HEENT: Atraumatic. Normocephalic. Anicteric  NECK:  No JVD.   PULMONARY: Clear to ausculation bilaterally. No wheezing. No rales  CVS: Normal S1, S2. Regular rate and rhythm. No murmurs.  ABDOMEN/GI: Soft, Nontender, Nondistended; Bowel sounds are present  EXTREMITIES:  No edema B/L LE.  NEUROLOGIC:  No motor deficit.  PSYCH: Alert & oriented x 3, normal affect      LABS:                        12.9   4.47  )-----------( 216      ( 05 Dec 2023 05:43 )             40.4     12-05    129<L>  |  94<L>  |  18  ----------------------------<  206<H>  4.3   |  26  |  1.0    Ca    9.0      05 Dec 2023 05:43  Mg     2.0     12-05    TPro  6.4  /  Alb  3.9  /  TBili  0.5  /  DBili  x   /  AST  17  /  ALT  27  /  AlkPhos  64  12-05        RADIOLOGY & ADDITIONAL TESTS:  Imaging or report Personally Reviewed:  [ ] YES  [ ] NO -->no new images    Telemetry reviewed independently - afib rvr  EKG reviewed independently -->no new EKGs    Consultant(s) Notes Reviewed:  [ ] YES  [ ] NO  Care Discussed with Consultants/Other Providers [ ] YES  [ ] NO    Case discussed with resident  Care discussed with pt        
CHIEF COMPLAINT:    Patient is a 58y old  Male who presents with a chief complaint of Shortness of breath     INTERVAL HPI/OVERNIGHT EVENTS:    Patient seen and examined at bedside. No acute overnight events occurred.    ROS: Denies SOB, chest pain. All other systems are negative.    Medications:  Standing  albuterol/ipratropium for Nebulization 3 milliLiter(s) Nebulizer every 6 hours  aspirin  chewable 81 milliGRAM(s) Oral daily  atorvastatin 80 milliGRAM(s) Oral at bedtime  clopidogrel Tablet 75 milliGRAM(s) Oral daily  dextrose 5%. 1000 milliLiter(s) IV Continuous <Continuous>  dextrose 5%. 1000 milliLiter(s) IV Continuous <Continuous>  dextrose 50% Injectable 25 Gram(s) IV Push once  dextrose 50% Injectable 25 Gram(s) IV Push once  dextrose 50% Injectable 12.5 Gram(s) IV Push once  enalapril 20 milliGRAM(s) Oral daily  enoxaparin Injectable 40 milliGRAM(s) SubCutaneous every 24 hours  famotidine    Tablet 40 milliGRAM(s) Oral two times a day  furosemide   Injectable 40 milliGRAM(s) IV Push daily  gemfibrozil 600 milliGRAM(s) Oral two times a day  glucagon  Injectable 1 milliGRAM(s) IntraMuscular once  hydrochlorothiazide 25 milliGRAM(s) Oral daily  insulin glargine Injectable (LANTUS) 35 Unit(s) SubCutaneous at bedtime  insulin lispro (ADMELOG) corrective regimen sliding scale   SubCutaneous three times a day before meals  insulin lispro Injectable (ADMELOG) 7 Unit(s) SubCutaneous three times a day before meals  spironolactone 25 milliGRAM(s) Oral daily  tamsulosin 0.4 milliGRAM(s) Oral at bedtime    PRN Meds  dextrose Oral Gel 15 Gram(s) Oral once PRN        Vital Signs:    T(F): 96 (12-04-23 @ 13:47), Max: 97.2 (12-03-23 @ 20:59)  HR: 112 (12-04-23 @ 13:47) (81 - 112)  BP: 114/51 (12-04-23 @ 13:47) (114/51 - 165/77)  RR: 18 (12-04-23 @ 13:47) (18 - 18)  SpO2: 99% (12-04-23 @ 08:16) (99% - 99%)  I&O's Summary    03 Dec 2023 07:01  -  04 Dec 2023 07:00  --------------------------------------------------------  IN: 1170 mL / OUT: 5150 mL / NET: -3980 mL    04 Dec 2023 07:01  -  04 Dec 2023 13:56  --------------------------------------------------------  IN: 210 mL / OUT: 0 mL / NET: 210 mL    POCT Blood Glucose.: 224 mg/dL (04 Dec 2023 11:42)  POCT Blood Glucose.: 238 mg/dL (04 Dec 2023 07:26)  POCT Blood Glucose.: 201 mg/dL (03 Dec 2023 21:34)  POCT Blood Glucose.: 171 mg/dL (03 Dec 2023 16:47)      PHYSICAL EXAM:  GENERAL:  NAD, morbidly obese  SKIN: No rashes or lesions  HEENT: Atraumatic. Normocephalic. Anicteric  NECK:  No JVD.   PULMONARY: Clear to ausculation bilaterally. No wheezing. No rales  CVS: Normal S1, S2. Regular rate and rhythm. No murmurs.  ABDOMEN/GI: Soft, Nontender, Nondistended; Bowel sounds are present  EXTREMITIES:  improved edema B/L LE.  NEUROLOGIC:  No motor deficit.  PSYCH: Alert & oriented x 3, normal affect      LABS:                        12.5   4.33  )-----------( 214      ( 04 Dec 2023 05:02 )             38.8     12-04    132<L>  |  97<L>  |  20  ----------------------------<  194<H>  4.3   |  23  |  0.9    Ca    9.2      04 Dec 2023 05:02  Mg     2.0     12-04    TPro  6.9  /  Alb  4.2  /  TBili  0.4  /  DBili  x   /  AST  21  /  ALT  32  /  AlkPhos  68  12-04        Trop <0.01, CKMB --, CK --, 12-01-23 @ 19:58  Trop <0.01, CKMB --, CK --, 12-01-23 @ 16:16        RADIOLOGY & ADDITIONAL TESTS:  Imaging or report Personally Reviewed:  [ ] YES  [ ] NO -->no new images    Telemetry reviewed independently - NSR, no acute events  EKG reviewed independently -->no new EKGs    Consultant(s) Notes Reviewed:  [ ] YES  [ ] NO  Care Discussed with Consultants/Other Providers [ ] YES  [ ] NO    Case discussed with resident  Care discussed with pt        
24H events:    Patient is a 58y old Male who presents with a chief complaint of Shortness of breath (06 Dec 2023 19:03)    Primary diagnosis of Heart failure      Today is hospital day 6d. This morning patient was seen and examined at bedside, resting comfortably in bed.    No acute or major events overnight.    Code Status: full code    Family communication:  Contact date:  Name of person contacted:  Relationship to patient:  Communication details:  What matters most:    PAST MEDICAL & SURGICAL HISTORY  CAD S/P percutaneous coronary angioplasty    DM (diabetes mellitus)    Hyperlipidemia    HTN (hypertension)      SOCIAL HISTORY:  Social History:      ALLERGIES:  No Known Allergies    MEDICATIONS:  STANDING MEDICATIONS  albuterol/ipratropium for Nebulization 3 milliLiter(s) Nebulizer every 6 hours  apixaban 5 milliGRAM(s) Oral every 12 hours  atorvastatin 80 milliGRAM(s) Oral at bedtime  clopidogrel Tablet 75 milliGRAM(s) Oral daily  dextrose 5%. 1000 milliLiter(s) IV Continuous <Continuous>  dextrose 5%. 1000 milliLiter(s) IV Continuous <Continuous>  dextrose 50% Injectable 25 Gram(s) IV Push once  dextrose 50% Injectable 25 Gram(s) IV Push once  dextrose 50% Injectable 12.5 Gram(s) IV Push once  enalapril 20 milliGRAM(s) Oral daily  famotidine    Tablet 40 milliGRAM(s) Oral two times a day  furosemide    Tablet 40 milliGRAM(s) Oral daily  gemfibrozil 600 milliGRAM(s) Oral two times a day  glucagon  Injectable 1 milliGRAM(s) IntraMuscular once  insulin glargine Injectable (LANTUS) 35 Unit(s) SubCutaneous at bedtime  insulin lispro (ADMELOG) corrective regimen sliding scale   SubCutaneous three times a day before meals  insulin lispro Injectable (ADMELOG) 7 Unit(s) SubCutaneous three times a day before meals  metoprolol tartrate 50 milliGRAM(s) Oral two times a day  spironolactone 25 milliGRAM(s) Oral daily  tamsulosin 0.4 milliGRAM(s) Oral at bedtime    PRN MEDICATIONS  dextrose Oral Gel 15 Gram(s) Oral once PRN    VITALS:   T(F): 97.3  HR: 95  BP: 138/81  RR: 18  SpO2: --    PHYSICAL EXAM:   GENERAL: NAD, lying in bed comfortably  HEAD:  Atraumatic, Normocephalic  EYES: EOMI, sclera clear  ENT: Moist mucous membranes  NECK: Supple, trachea midline  CHEST/LUNG: Clear to auscultation anteriorly bilaterally; No rales, rhonchi, wheezing, or rubs. Unlabored respirations  HEART: irregular rhythm ; No appreciable murmurs, rubs, or gallops  ABDOMEN: (+)BS; Soft, nontender, nondistended  EXTREMITIES: No clubbing, cyanosis, or edema  NERVOUS SYSTEM:  A&Ox3, no noted facial droop. Moving all extremities w/o difficulty.   SKIN: No rashes or lesions to b/l forearm, face.     (  ) Indwelling Young Catheter:   Date insterted:    Reason (  ) Critical illness     (  ) urinary retention    (  ) Accurate Ins/Outs Monitoring     (  ) CMO patient    (  ) Central Line:   Date inserted:  Location: (  ) Right IJ     (  ) Left IJ     (  ) Right Fem     (  ) Left Fem    (  ) SPC        (  ) pigtail       (  ) PEG tube       (  ) colostomy       (  ) jejunostomy  (  ) U-Dall    LABS:                        13.1   4.62  )-----------( 223      ( 06 Dec 2023 06:08 )             41.1     12-06    132<L>  |  97<L>  |  23<H>  ----------------------------<  242<H>  4.7   |  25  |  1.1    Ca    9.4      06 Dec 2023 06:08  Mg     2.2     12-06    TPro  6.9  /  Alb  4.1  /  TBili  0.4  /  DBili  x   /  AST  26  /  ALT  41  /  AlkPhos  71  12-06      Urinalysis Basic - ( 06 Dec 2023 06:08 )    Color: x / Appearance: x / SG: x / pH: x  Gluc: 242 mg/dL / Ketone: x  / Bili: x / Urobili: x   Blood: x / Protein: x / Nitrite: x   Leuk Esterase: x / RBC: x / WBC x   Sq Epi: x / Non Sq Epi: x / Bacteria: x      ABG - ( 05 Dec 2023 15:05 )  pH, Arterial: 7.47  pH, Blood: x     /  pCO2: 40    /  pO2: 94    / HCO3: 29    / Base Excess: 5.0   /  SaO2: 98.6                      RADIOLOGY:          
SUBJ: Patient seen and examined.       MEDICATIONS  (STANDING):  albuterol/ipratropium for Nebulization 3 milliLiter(s) Nebulizer every 6 hours  apixaban 5 milliGRAM(s) Oral every 12 hours  atorvastatin 80 milliGRAM(s) Oral at bedtime  clopidogrel Tablet 75 milliGRAM(s) Oral daily  dextrose 5%. 1000 milliLiter(s) (50 mL/Hr) IV Continuous <Continuous>  dextrose 5%. 1000 milliLiter(s) (100 mL/Hr) IV Continuous <Continuous>  dextrose 50% Injectable 25 Gram(s) IV Push once  dextrose 50% Injectable 25 Gram(s) IV Push once  dextrose 50% Injectable 12.5 Gram(s) IV Push once  enalapril 20 milliGRAM(s) Oral daily  famotidine    Tablet 40 milliGRAM(s) Oral two times a day  furosemide   Injectable 40 milliGRAM(s) IV Push daily  gemfibrozil 600 milliGRAM(s) Oral two times a day  glucagon  Injectable 1 milliGRAM(s) IntraMuscular once  hydrochlorothiazide 25 milliGRAM(s) Oral daily  insulin glargine Injectable (LANTUS) 35 Unit(s) SubCutaneous at bedtime  insulin lispro (ADMELOG) corrective regimen sliding scale   SubCutaneous three times a day before meals  insulin lispro Injectable (ADMELOG) 7 Unit(s) SubCutaneous three times a day before meals  metoprolol tartrate 25 milliGRAM(s) Oral two times a day  spironolactone 25 milliGRAM(s) Oral daily  tamsulosin 0.4 milliGRAM(s) Oral at bedtime    MEDICATIONS  (PRN):  dextrose Oral Gel 15 Gram(s) Oral once PRN Blood Glucose LESS THAN 70 milliGRAM(s)/deciliter            Vital Signs Last 24 Hrs  T(C): 35.6 (04 Dec 2023 13:47), Max: 36.2 (03 Dec 2023 20:59)  T(F): 96 (04 Dec 2023 13:47), Max: 97.2 (03 Dec 2023 20:59)  HR: 99 (04 Dec 2023 17:22) (81 - 112)  BP: 119/55 (04 Dec 2023 17:22) (114/51 - 165/77)  BP(mean): 78 (04 Dec 2023 17:22) (78 - 78)  RR: 18 (04 Dec 2023 13:47) (18 - 18)  SpO2: 99% (04 Dec 2023 08:16) (99% - 99%)    Parameters below as of 04 Dec 2023 08:16  Patient On (Oxygen Delivery Method): room air         REVIEW OF SYSTEMS:  CONSTITUTIONAL: No fever  NECK: No pain or stiffness  CARDIOVASCULAR: patient denies chest pain, shortness of breath or palpitations .  Respiratory: No cough or wheezing.  NEUROLOGICAL: No focal deficits to report.  GI: no BRBPR, no N,V,diarrhea.    PSYCHIATRY: normal mood and affect  HEENT: no nasal discharge, no ecchymosis  SKIN: no ecchymosis, no breakdown  MUSCULOSKELETAL: Full range of motion x4.      PHYSICAL EXAM:  GENERAL: NAD  HEAD:  Atraumatic, Normocephalic  NECK: Supple, No JVD  NERVOUS SYSTEM:  Alert & Oriented X3, Good concentration  CHEST/LUNG: Clear to anterior auscultation bilaterally  HEART: Irregular rate and rhythm; KATIE  ABDOMEN: Soft, Nontender, Nondistended;   EXTREMITIES:  No  edema  SKIN: No rashes or lesions  Psych: Appropriate mood and affect     	  TELEMETRY:      LABS:                        12.5   4.33  )-----------( 214      ( 04 Dec 2023 05:02 )             38.8     12-04    132<L>  |  97<L>  |  20  ----------------------------<  194<H>  4.3   |  23  |  0.9    Ca    9.2      04 Dec 2023 05:02  Mg     2.0     12-04    TPro  6.9  /  Alb  4.2  /  TBili  0.4  /  DBili  x   /  AST  21  /  ALT  32  /  AlkPhos  68  12-04            I&O's Summary    03 Dec 2023 07:01  -  04 Dec 2023 07:00  --------------------------------------------------------  IN: 1170 mL / OUT: 5150 mL / NET: -3980 mL    04 Dec 2023 07:01  -  04 Dec 2023 20:40  --------------------------------------------------------  IN: 450 mL / OUT: 500 mL / NET: -50 mL      BNP  RADIOLOGY & ADDITIONAL STUDIES:    IMPRESSION AND PLAN:    New onset Afib  SARIKA  CAP  Moderate AS  HFpEF/ Euvolemic     - Management as per primary team  - Continue Metoprolol and Eliquis   - Will follow         
SUBJ: Patient seen and examined. No events overnight. SOB resolved      MEDICATIONS  (STANDING):  albuterol/ipratropium for Nebulization 3 milliLiter(s) Nebulizer every 6 hours  aspirin  chewable 81 milliGRAM(s) Oral daily  atorvastatin 80 milliGRAM(s) Oral at bedtime  clopidogrel Tablet 75 milliGRAM(s) Oral daily  dextrose 5%. 1000 milliLiter(s) (50 mL/Hr) IV Continuous <Continuous>  dextrose 5%. 1000 milliLiter(s) (100 mL/Hr) IV Continuous <Continuous>  dextrose 50% Injectable 25 Gram(s) IV Push once  dextrose 50% Injectable 25 Gram(s) IV Push once  dextrose 50% Injectable 12.5 Gram(s) IV Push once  enalapril 20 milliGRAM(s) Oral daily  enoxaparin Injectable 40 milliGRAM(s) SubCutaneous every 24 hours  famotidine    Tablet 40 milliGRAM(s) Oral two times a day  furosemide   Injectable 40 milliGRAM(s) IV Push daily  gemfibrozil 600 milliGRAM(s) Oral two times a day  glucagon  Injectable 1 milliGRAM(s) IntraMuscular once  hydrochlorothiazide 25 milliGRAM(s) Oral daily  insulin glargine Injectable (LANTUS) 35 Unit(s) SubCutaneous at bedtime  insulin lispro (ADMELOG) corrective regimen sliding scale   SubCutaneous three times a day before meals  insulin lispro Injectable (ADMELOG) 7 Unit(s) SubCutaneous three times a day before meals  spironolactone 25 milliGRAM(s) Oral daily  tamsulosin 0.4 milliGRAM(s) Oral at bedtime    MEDICATIONS  (PRN):  dextrose Oral Gel 15 Gram(s) Oral once PRN Blood Glucose LESS THAN 70 milliGRAM(s)/deciliter            Vital Signs Last 24 Hrs  T(C): 35.9 (02 Dec 2023 13:07), Max: 36.3 (01 Dec 2023 20:08)  T(F): 96.7 (02 Dec 2023 13:07), Max: 97.3 (01 Dec 2023 20:08)  HR: 76 (02 Dec 2023 13:07) (76 - 92)  BP: 144/70 (02 Dec 2023 13:07) (126/60 - 145/66)  BP(mean): --  RR: 18 (02 Dec 2023 13:45) (18 - 20)  SpO2: 100% (02 Dec 2023 13:45) (98% - 100%)    Parameters below as of 02 Dec 2023 13:45  Patient On (Oxygen Delivery Method): room air         REVIEW OF SYSTEMS:  CONSTITUTIONAL: No fever  NECK: No pain or stiffness  CARDIOVASCULAR: patient denies chest pain, shortness of breath or palpitations .  Respiratory: No cough or wheezing.  NEUROLOGICAL: No focal deficits to report.  GI: no BRBPR, no N,V,diarrhea.    PSYCHIATRY: normal mood and affect  HEENT: no nasal discharge, no ecchymosis  SKIN: no ecchymosis, no breakdown  MUSCULOSKELETAL: Full range of motion x4.      PHYSICAL EXAM:  GENERAL: NAD  HEAD:  Atraumatic, Normocephalic  NECK: Supple, No JVD  NERVOUS SYSTEM:  Alert & Oriented X3, Good concentration  CHEST/LUNG: Clear to anterior auscultation bilaterally  HEART: Regular rate and rhythm; systolic murmur  ABDOMEN: Soft, Nontender, Nondistended;   EXTREMITIES:  No  edema  SKIN: No rashes or lesions  Psych: Appropriate mood and affect     	  TELEMETRY:      LABS:                        12.3   3.52  )-----------( 173      ( 02 Dec 2023 07:25 )             37.7     12-02    136  |  96<L>  |  12  ----------------------------<  175<H>  3.9   |  27  |  0.9    Ca    9.2      02 Dec 2023 07:25  Mg     1.9     12-02    TPro  6.6  /  Alb  4.0  /  TBili  0.6  /  DBili  x   /  AST  22  /  ALT  39  /  AlkPhos  67  12-02    CARDIAC MARKERS ( 01 Dec 2023 19:58 )  x     / <0.01 ng/mL / x     / x     / x      CARDIAC MARKERS ( 01 Dec 2023 16:16 )  x     / <0.01 ng/mL / x     / x     / x      CARDIAC MARKERS ( 01 Dec 2023 04:45 )  x     / <0.01 ng/mL / x     / x     / x              I&O's Summary    01 Dec 2023 07:01  -  02 Dec 2023 07:00  --------------------------------------------------------  IN: 390 mL / OUT: 0 mL / NET: 390 mL    02 Dec 2023 07:01  -  02 Dec 2023 17:24  --------------------------------------------------------  IN: 450 mL / OUT: 3350 mL / NET: -2900 mL      BNP  RADIOLOGY & ADDITIONAL STUDIES:    IMPRESSION AND PLAN:    CHF/ Euvolemic   CAD  Sinus pause  Systolic murmur     - Management as per primary team  - Continue DAPT  - Continue Furosemide on discharge  - Outpt echo   - Sinus pause secondary to SARIKA/ appreciate EP input? sleep study  outpt  - Will follow outpt    
  AROLDO BOSS  58y  Male      Patient is a 58y old  Male who presents with a chief complaint of Shortness of breath (06 Dec 2023 09:18)      INTERVAL HPI/OVERNIGHT EVENTS:  He feels ok, no chest pain, SOB improved  Vital Signs Last 24 Hrs  T(C): 36 (06 Dec 2023 05:03), Max: 36.8 (05 Dec 2023 20:42)  T(F): 96.8 (06 Dec 2023 05:03), Max: 98.3 (05 Dec 2023 20:42)  HR: 82 (06 Dec 2023 05:03) (63 - 82)  BP: 131/63 (06 Dec 2023 05:03) (131/63 - 169/82)  BP(mean): --  RR: 18 (06 Dec 2023 05:03) (18 - 18)  SpO2: 97% (05 Dec 2023 23:01) (96% - 97%)    Parameters below as of 05 Dec 2023 20:48  Patient On (Oxygen Delivery Method): room air          12-05-23 @ 07:01  -  12-06-23 @ 07:00  --------------------------------------------------------  IN: 790 mL / OUT: 3100 mL / NET: -2310 mL    12-06-23 @ 07:01  -  12-06-23 @ 11:47  --------------------------------------------------------  IN: 560 mL / OUT: 1950 mL / NET: -1390 mL            Consultant(s) Notes Reviewed:  [x ] YES  [ ] NO          MEDICATIONS  (STANDING):  albuterol/ipratropium for Nebulization 3 milliLiter(s) Nebulizer every 6 hours  apixaban 5 milliGRAM(s) Oral every 12 hours  atorvastatin 80 milliGRAM(s) Oral at bedtime  clopidogrel Tablet 75 milliGRAM(s) Oral daily  dextrose 5%. 1000 milliLiter(s) (50 mL/Hr) IV Continuous <Continuous>  dextrose 5%. 1000 milliLiter(s) (100 mL/Hr) IV Continuous <Continuous>  dextrose 50% Injectable 25 Gram(s) IV Push once  dextrose 50% Injectable 25 Gram(s) IV Push once  dextrose 50% Injectable 12.5 Gram(s) IV Push once  enalapril 20 milliGRAM(s) Oral daily  famotidine    Tablet 40 milliGRAM(s) Oral two times a day  furosemide    Tablet 40 milliGRAM(s) Oral daily  gemfibrozil 600 milliGRAM(s) Oral two times a day  glucagon  Injectable 1 milliGRAM(s) IntraMuscular once  insulin glargine Injectable (LANTUS) 35 Unit(s) SubCutaneous at bedtime  insulin lispro (ADMELOG) corrective regimen sliding scale   SubCutaneous three times a day before meals  insulin lispro Injectable (ADMELOG) 7 Unit(s) SubCutaneous three times a day before meals  metoprolol tartrate 25 milliGRAM(s) Oral two times a day  spironolactone 25 milliGRAM(s) Oral daily  tamsulosin 0.4 milliGRAM(s) Oral at bedtime    MEDICATIONS  (PRN):  dextrose Oral Gel 15 Gram(s) Oral once PRN Blood Glucose LESS THAN 70 milliGRAM(s)/deciliter      LABS                          13.1   4.62  )-----------( 223      ( 06 Dec 2023 06:08 )             41.1     12-06    132<L>  |  97<L>  |  23<H>  ----------------------------<  242<H>  4.7   |  25  |  1.1    Ca    9.4      06 Dec 2023 06:08  Mg     2.2     12-06    TPro  6.9  /  Alb  4.1  /  TBili  0.4  /  DBili  x   /  AST  26  /  ALT  41  /  AlkPhos  71  12-06    ABG - ( 05 Dec 2023 15:05 )  pH, Arterial: 7.47  pH, Blood: x     /  pCO2: 40    /  pO2: 94    / HCO3: 29    / Base Excess: 5.0   /  SaO2: 98.6              Urinalysis Basic - ( 06 Dec 2023 06:08 )    Color: x / Appearance: x / SG: x / pH: x  Gluc: 242 mg/dL / Ketone: x  / Bili: x / Urobili: x   Blood: x / Protein: x / Nitrite: x   Leuk Esterase: x / RBC: x / WBC x   Sq Epi: x / Non Sq Epi: x / Bacteria: x        Lactate Trend        CAPILLARY BLOOD GLUCOSE      POCT Blood Glucose.: 259 mg/dL (06 Dec 2023 11:33)        RADIOLOGY & ADDITIONAL TESTS:    Imaging Personally Reviewed:  [ ] YES  [ ] NO    HEALTH ISSUES - PROBLEM Dx:          PHYSICAL EXAM:  GENERAL: NAD, well-developed.  HEAD:  Atraumatic, Normocephalic.  EYES: EOMI, PERRLA, conjunctiva and sclera clear.  NECK: Supple, No JVD.  CHEST/LUNG: Clear to auscultation bilaterally; No wheeze.  HEART: Irregular rate and rhythm; S1 S2. SM 2/6 on left sternum  ABDOMEN: Soft, Nontender, Nondistended; Bowel sounds present.  EXTREMITIES:  leg edema 1+  PSYCH: AAOx3.  NEUROLOGY: non-focal.  SKIN: No rashes or lesions.
  AROLDO BOSS  58y  Male      Patient is a 58y old  Male who presents with a chief complaint of Shortness of breath (07 Dec 2023 07:20)      INTERVAL HPI/OVERNIGHT EVENTS:  He feels ok, no chest pain, HR is better controlled.   Vital Signs Last 24 Hrs  T(C): 36.3 (07 Dec 2023 05:14), Max: 36.3 (07 Dec 2023 05:14)  T(F): 97.3 (07 Dec 2023 05:14), Max: 97.3 (07 Dec 2023 05:14)  HR: 95 (07 Dec 2023 05:14) (87 - 100)  BP: 138/81 (07 Dec 2023 05:14) (97/57 - 150/92)  BP(mean): 113 (06 Dec 2023 14:56) (113 - 113)  RR: 18 (07 Dec 2023 05:14) (18 - 18)  SpO2: --          12-06-23 @ 07:01  -  12-07-23 @ 07:00  --------------------------------------------------------  IN: 880 mL / OUT: 5950 mL / NET: -5070 mL    12-07-23 @ 07:01  -  12-07-23 @ 12:05  --------------------------------------------------------  IN: 325 mL / OUT: 300 mL / NET: 25 mL            Consultant(s) Notes Reviewed:  [x ] YES  [ ] NO          MEDICATIONS  (STANDING):  albuterol/ipratropium for Nebulization 3 milliLiter(s) Nebulizer every 6 hours  apixaban 5 milliGRAM(s) Oral every 12 hours  atorvastatin 80 milliGRAM(s) Oral at bedtime  clopidogrel Tablet 75 milliGRAM(s) Oral daily  dextrose 5%. 1000 milliLiter(s) (50 mL/Hr) IV Continuous <Continuous>  dextrose 5%. 1000 milliLiter(s) (100 mL/Hr) IV Continuous <Continuous>  dextrose 50% Injectable 25 Gram(s) IV Push once  dextrose 50% Injectable 12.5 Gram(s) IV Push once  dextrose 50% Injectable 25 Gram(s) IV Push once  enalapril 20 milliGRAM(s) Oral daily  famotidine    Tablet 40 milliGRAM(s) Oral two times a day  furosemide    Tablet 40 milliGRAM(s) Oral daily  gemfibrozil 600 milliGRAM(s) Oral two times a day  glucagon  Injectable 1 milliGRAM(s) IntraMuscular once  insulin glargine Injectable (LANTUS) 35 Unit(s) SubCutaneous at bedtime  insulin lispro (ADMELOG) corrective regimen sliding scale   SubCutaneous three times a day before meals  insulin lispro Injectable (ADMELOG) 7 Unit(s) SubCutaneous three times a day before meals  metoprolol tartrate 50 milliGRAM(s) Oral two times a day  spironolactone 25 milliGRAM(s) Oral daily  tamsulosin 0.4 milliGRAM(s) Oral at bedtime    MEDICATIONS  (PRN):  dextrose Oral Gel 15 Gram(s) Oral once PRN Blood Glucose LESS THAN 70 milliGRAM(s)/deciliter      LABS                          12.2   4.46  )-----------( 217      ( 07 Dec 2023 06:06 )             37.5     12-07    132<L>  |  94<L>  |  22<H>  ----------------------------<  204<H>  4.2   |  26  |  1.0    Ca    9.3      07 Dec 2023 06:06  Mg     2.1     12-07    TPro  6.5  /  Alb  3.9  /  TBili  0.5  /  DBili  x   /  AST  29  /  ALT  46<H>  /  AlkPhos  64  12-07    ABG - ( 05 Dec 2023 15:05 )  pH, Arterial: 7.47  pH, Blood: x     /  pCO2: 40    /  pO2: 94    / HCO3: 29    / Base Excess: 5.0   /  SaO2: 98.6              Urinalysis Basic - ( 07 Dec 2023 06:06 )    Color: x / Appearance: x / SG: x / pH: x  Gluc: 204 mg/dL / Ketone: x  / Bili: x / Urobili: x   Blood: x / Protein: x / Nitrite: x   Leuk Esterase: x / RBC: x / WBC x   Sq Epi: x / Non Sq Epi: x / Bacteria: x        Lactate Trend        CAPILLARY BLOOD GLUCOSE      POCT Blood Glucose.: 229 mg/dL (07 Dec 2023 11:25)        RADIOLOGY & ADDITIONAL TESTS:    Imaging Personally Reviewed:  [ ] YES  [ ] NO    HEALTH ISSUES - PROBLEM Dx:          PHYSICAL EXAM:  GENERAL: NAD, well-developed.  HEAD:  Atraumatic, Normocephalic.  EYES: EOMI, PERRLA, conjunctiva and sclera clear.  NECK: Supple, No JVD.  CHEST/LUNG: Clear to auscultation bilaterally; No wheeze.  HEART: Irregular rate and rhythm; S1 S2. SM 2/6 on left sternum  ABDOMEN: Soft, Nontender, Nondistended; Bowel sounds present.  EXTREMITIES:  leg edema 1+  PSYCH: AAOx3.  NEUROLOGY: non-focal.  SKIN: No rashes or lesions.
CHIEF COMPLAINT:    Patient is a 58y old  Male who presents with a chief complaint of Shortness of breath     INTERVAL HPI/OVERNIGHT EVENTS:    Patient seen and examined at bedside. No acute overnight events occurred.    ROS: Denies SOB, chest pain. All other systems are negative.    Medications:  Standing  albuterol/ipratropium for Nebulization 3 milliLiter(s) Nebulizer every 6 hours  aspirin  chewable 81 milliGRAM(s) Oral daily  atorvastatin 80 milliGRAM(s) Oral at bedtime  clopidogrel Tablet 75 milliGRAM(s) Oral daily  dextrose 5%. 1000 milliLiter(s) IV Continuous <Continuous>  dextrose 5%. 1000 milliLiter(s) IV Continuous <Continuous>  dextrose 50% Injectable 25 Gram(s) IV Push once  dextrose 50% Injectable 25 Gram(s) IV Push once  dextrose 50% Injectable 12.5 Gram(s) IV Push once  enalapril 20 milliGRAM(s) Oral daily  enoxaparin Injectable 40 milliGRAM(s) SubCutaneous every 24 hours  famotidine    Tablet 40 milliGRAM(s) Oral two times a day  furosemide   Injectable 40 milliGRAM(s) IV Push daily  gemfibrozil 600 milliGRAM(s) Oral two times a day  glucagon  Injectable 1 milliGRAM(s) IntraMuscular once  hydrochlorothiazide 25 milliGRAM(s) Oral daily  insulin glargine Injectable (LANTUS) 35 Unit(s) SubCutaneous at bedtime  insulin lispro (ADMELOG) corrective regimen sliding scale   SubCutaneous three times a day before meals  insulin lispro Injectable (ADMELOG) 7 Unit(s) SubCutaneous three times a day before meals  spironolactone 25 milliGRAM(s) Oral daily  tamsulosin 0.4 milliGRAM(s) Oral at bedtime    PRN Meds  dextrose Oral Gel 15 Gram(s) Oral once PRN        Vital Signs:    T(F): 96.7 (12-02-23 @ 13:07), Max: 97.5 (12-01-23 @ 16:03)  HR: 76 (12-02-23 @ 13:07) (76 - 92)  BP: 144/70 (12-02-23 @ 13:07) (126/60 - 145/66)  RR: 18 (12-02-23 @ 13:45) (18 - 20)  SpO2: 100% (12-02-23 @ 13:45) (98% - 100%)  I&O's Summary    01 Dec 2023 07:01  -  02 Dec 2023 07:00  --------------------------------------------------------  IN: 390 mL / OUT: 0 mL / NET: 390 mL    02 Dec 2023 07:01  -  02 Dec 2023 14:15  --------------------------------------------------------  IN: 450 mL / OUT: 3350 mL / NET: -2900 mL    POCT Blood Glucose.: 229 mg/dL (02 Dec 2023 11:05)  POCT Blood Glucose.: 173 mg/dL (02 Dec 2023 07:25)  POCT Blood Glucose.: 186 mg/dL (01 Dec 2023 21:37)  POCT Blood Glucose.: 187 mg/dL (01 Dec 2023 16:34)      PHYSICAL EXAM:  GENERAL:  NAD  SKIN: No rashes or lesions  HEENT: Atraumatic. Normocephalic. Anicteric  NECK:  No JVD.   PULMONARY: Clear to ausculation bilaterally. No wheezing. No rales  CVS: Normal S1, S2. Regular rate and rhythm. No murmurs.  ABDOMEN/GI: Soft, Nontender, Nondistended; Bowel sounds are present  EXTREMITIES:  Non pitting edema B/L LE, tense skin  NEUROLOGIC:  No motor deficit.  PSYCH: Alert & oriented x 3, normal affect      LABS:                        12.3   3.52  )-----------( 173      ( 02 Dec 2023 07:25 )             37.7     12-02    136  |  96<L>  |  12  ----------------------------<  175<H>  3.9   |  27  |  0.9    Ca    9.2      02 Dec 2023 07:25  Mg     1.9     12-02    TPro  6.6  /  Alb  4.0  /  TBili  0.6  /  DBili  x   /  AST  22  /  ALT  39  /  AlkPhos  67  12-02        Trop <0.01, CKMB --, CK --, 12-01-23 @ 19:58  Trop <0.01, CKMB --, CK --, 12-01-23 @ 16:16  Trop <0.01, CKMB --, CK --, 12-01-23 @ 04:45        RADIOLOGY & ADDITIONAL TESTS:  Imaging or report Personally Reviewed:  [ ] YES  [ ] NO -->no new images    Telemetry reviewed independently -3 second pause  EKG reviewed independently -->no new EKGs    Consultant(s) Notes Reviewed:  [ ] YES  [ ] NO  Care Discussed with Consultants/Other Providers [ ] YES  [ ] NO    Case discussed with resident  Care discussed with pt        
SUBJ: Patient seen and examined. No events overnight.       MEDICATIONS  (STANDING):  albuterol/ipratropium for Nebulization 3 milliLiter(s) Nebulizer every 6 hours  apixaban 5 milliGRAM(s) Oral every 12 hours  atorvastatin 80 milliGRAM(s) Oral at bedtime  clopidogrel Tablet 75 milliGRAM(s) Oral daily  dextrose 5%. 1000 milliLiter(s) (100 mL/Hr) IV Continuous <Continuous>  dextrose 5%. 1000 milliLiter(s) (50 mL/Hr) IV Continuous <Continuous>  dextrose 50% Injectable 25 Gram(s) IV Push once  dextrose 50% Injectable 25 Gram(s) IV Push once  dextrose 50% Injectable 12.5 Gram(s) IV Push once  enalapril 20 milliGRAM(s) Oral daily  famotidine    Tablet 40 milliGRAM(s) Oral two times a day  furosemide    Tablet 40 milliGRAM(s) Oral daily  gemfibrozil 600 milliGRAM(s) Oral two times a day  glucagon  Injectable 1 milliGRAM(s) IntraMuscular once  insulin glargine Injectable (LANTUS) 35 Unit(s) SubCutaneous at bedtime  insulin lispro (ADMELOG) corrective regimen sliding scale   SubCutaneous three times a day before meals  insulin lispro Injectable (ADMELOG) 7 Unit(s) SubCutaneous three times a day before meals  metoprolol tartrate 50 milliGRAM(s) Oral two times a day  spironolactone 25 milliGRAM(s) Oral daily  tamsulosin 0.4 milliGRAM(s) Oral at bedtime    MEDICATIONS  (PRN):  dextrose Oral Gel 15 Gram(s) Oral once PRN Blood Glucose LESS THAN 70 milliGRAM(s)/deciliter            Vital Signs Last 24 Hrs  T(C): 36.1 (07 Dec 2023 13:42), Max: 36.3 (07 Dec 2023 05:14)  T(F): 97 (07 Dec 2023 13:42), Max: 97.3 (07 Dec 2023 05:14)  HR: 102 (07 Dec 2023 16:52) (95 - 102)  BP: 119/63 (07 Dec 2023 16:52) (107/62 - 138/81)  BP(mean): --  RR: 18 (07 Dec 2023 13:42) (18 - 18)  SpO2: --         REVIEW OF SYSTEMS:  CONSTITUTIONAL: No fever  NECK: No pain or stiffness  CARDIOVASCULAR: patient denies chest pain, shortness of breath or palpitations .  Respiratory: No cough or wheezing.  NEUROLOGICAL: No focal deficits to report.  GI: no BRBPR, no N,V,diarrhea.    PSYCHIATRY: normal mood and affect  HEENT: no nasal discharge, no ecchymosis  SKIN: no ecchymosis, no breakdown  MUSCULOSKELETAL: Full range of motion x4.      PHYSICAL EXAM:  GENERAL: NAD  HEAD:  Atraumatic, Normocephalic  NECK: Supple, No JVD  NERVOUS SYSTEM:  Alert & Oriented X3, Good concentration  CHEST/LUNG: Clear to anterior auscultation bilaterally  HEART: Regular rate and rhythm; KATIE  ABDOMEN: Soft, Nontender, Nondistended;   EXTREMITIES:  trace  edema  SKIN: No rashes or lesions  Psych: Appropriate mood and affect     	  TELEMETRY:      LABS:                        12.2   4.46  )-----------( 217      ( 07 Dec 2023 06:06 )             37.5     12-07    132<L>  |  94<L>  |  22<H>  ----------------------------<  204<H>  4.2   |  26  |  1.0    Ca    9.3      07 Dec 2023 06:06  Mg     2.1     12-07    TPro  6.5  /  Alb  3.9  /  TBili  0.5  /  DBili  x   /  AST  29  /  ALT  46<H>  /  AlkPhos  64  12-07            I&O's Summary    06 Dec 2023 07:01  -  07 Dec 2023 07:00  --------------------------------------------------------  IN: 880 mL / OUT: 5950 mL / NET: -5070 mL    07 Dec 2023 07:01  -  07 Dec 2023 18:44  --------------------------------------------------------  IN: 325 mL / OUT: 800 mL / NET: -475 mL      BNP  RADIOLOGY & ADDITIONAL STUDIES:    IMPRESSION AND PLAN:  AFib RVR  HFpEF  Moderate AS  CAD s/p PCI to LCX  SARIKA  Sinus pause at night secondary to sleep apnea    - Management as per primary team  - Continue Metoprolol  - Continue Eliquis and Plavix  - Will follow out patient       
24H events:    Patient is a 58y old Male who presents with a chief complaint of Shortness of breath (04 Dec 2023 08:40)    Primary diagnosis of Heart failure    Today is hospital day 3d. This morning patient was seen and examined at bedside, resting comfortably in bed.    No acute or major events overnight.    PAST MEDICAL & SURGICAL HISTORY  CAD S/P percutaneous coronary angioplasty  DM (diabetes mellitus)  Hyperlipidemia  HTN (hypertension)    SOCIAL HISTORY:  Social History:    ALLERGIES:  No Known Allergies    MEDICATIONS:  STANDING MEDICATIONS  albuterol/ipratropium for Nebulization 3 milliLiter(s) Nebulizer every 6 hours  aspirin  chewable 81 milliGRAM(s) Oral daily  atorvastatin 80 milliGRAM(s) Oral at bedtime  clopidogrel Tablet 75 milliGRAM(s) Oral daily  dextrose 5%. 1000 milliLiter(s) IV Continuous <Continuous>  dextrose 5%. 1000 milliLiter(s) IV Continuous <Continuous>  dextrose 50% Injectable 25 Gram(s) IV Push once  dextrose 50% Injectable 12.5 Gram(s) IV Push once  dextrose 50% Injectable 25 Gram(s) IV Push once  enalapril 20 milliGRAM(s) Oral daily  enoxaparin Injectable 40 milliGRAM(s) SubCutaneous every 24 hours  famotidine    Tablet 40 milliGRAM(s) Oral two times a day  furosemide   Injectable 40 milliGRAM(s) IV Push daily  gemfibrozil 600 milliGRAM(s) Oral two times a day  glucagon  Injectable 1 milliGRAM(s) IntraMuscular once  hydrochlorothiazide 25 milliGRAM(s) Oral daily  insulin glargine Injectable (LANTUS) 35 Unit(s) SubCutaneous at bedtime  insulin lispro (ADMELOG) corrective regimen sliding scale   SubCutaneous three times a day before meals  insulin lispro Injectable (ADMELOG) 7 Unit(s) SubCutaneous three times a day before meals  spironolactone 25 milliGRAM(s) Oral daily  tamsulosin 0.4 milliGRAM(s) Oral at bedtime    PRN MEDICATIONS  dextrose Oral Gel 15 Gram(s) Oral once PRN    VITALS:   T(F): 97.2  HR: 84  BP: 165/77  RR: 18  SpO2: 99%    PHYSICAL EXAM:  GENERAL:   ( x ) NAD, lying in bed comfortably     (  ) obtunded     (  ) lethargic     (  ) somnolent    HEAD:   ( x ) Atraumatic     (  ) hematoma     (  ) laceration (specify location:       )     NECK:  (  ) Supple     (  ) neck stiffness     (  ) nuchal rigidity     (  )  no JVD     (  ) JVD present ( -- cm)    HEART:  Rate -->     ( x ) normal rate     (  ) bradycardic     (  ) tachycardic  Rhythm -->     ( x ) regular     (  ) regularly irregular     (  ) irregularly irregular  Murmurs -->     (  ) normal s1s2     (  ) systolic murmur     (  ) diastolic murmur     (  ) continuous murmur      (  ) S3 present     (  ) S4 present    LUNGS:   ( x )Unlabored respirations     (  ) tachypnea  (  ) B/L air entry     (  ) decreased breath sounds in:  (location     )    (  ) no adventitious sound     (  ) crackles     (  ) wheezing      (  ) rhonchi      (specify location:       )  (  ) chest wall tenderness (specify location:       )    ABDOMEN:   ( x ) Soft     (  ) tense   |   (  ) nondistended     (  ) distended   |   (  ) +BS     (  ) hypoactive bowel sounds     (  ) hyperactive bowel sounds  (  ) nontender     (  ) RUQ tenderness     (  ) RLQ tenderness     (  ) LLQ tenderness     (  ) epigastric tenderness     (  ) diffuse tenderness  (  ) Splenomegaly      (  ) Hepatomegaly      (  ) Jaundice     (  ) ecchymosis     EXTREMITIES:  ( x ) Normal     (  ) Rash     (  ) ecchymosis     (  ) varicose veins      (  ) pitting edema     (  ) non-pitting edema   (  ) ulceration     (  ) gangrene:     (location:     )    NERVOUS SYSTEM:    ( x ) A&Ox3     (  ) confused     (  ) lethargic  CN II-XII:     (  ) Intact     (  ) deficits found     (Specify:     )   Upper extremities:     (  ) no sensorimotor deficits     (  ) weakness     (  ) loss of proprioception/vibration     (  ) loss of touch/temperature (specify:    )  Lower extremities:     (  ) no sensorimotor deficits     (  ) weakness     (  ) loss of proprioception/vibration     (  ) loss of touch/temperature (specify:    )    SKIN:   (  ) No rashes or lesions     (  ) maculopapular rash     (  ) pustules     (  ) vesicles     (  ) ulcer     (  ) ecchymosis     (specify location:     )    AMPAC score:    (  ) Indwelling Young Catheter:   Date insterted:    Reason (  ) Critical illness     (  ) urinary retention    (  ) Accurate Ins/Outs Monitoring     (  ) CMO patient    (  ) Central Line:   Date inserted:  Location: (  ) Right IJ     (  ) Left IJ     (  ) Right Fem     (  ) Left Fem    (  ) SPC        (  ) pigtail       (  ) PEG tube       (  ) colostomy       (  ) jejunostomy  (  ) U-Dall    LABS:                        12.5   4.33  )-----------( 214      ( 04 Dec 2023 05:02 )             38.8     12-04    132<L>  |  97<L>  |  20  ----------------------------<  194<H>  4.3   |  23  |  0.9    Ca    9.2      04 Dec 2023 05:02  Mg     2.0     12-04    TPro  6.9  /  Alb  4.2  /  TBili  0.4  /  DBili  x   /  AST  21  /  ALT  32  /  AlkPhos  68  12-04      Urinalysis Basic - ( 04 Dec 2023 05:02 )    Color: x / Appearance: x / SG: x / pH: x  Gluc: 194 mg/dL / Ketone: x  / Bili: x / Urobili: x   Blood: x / Protein: x / Nitrite: x   Leuk Esterase: x / RBC: x / WBC x   Sq Epi: x / Non Sq Epi: x / Bacteria: x      RADIOLOGY:    < from: TTE Echo Complete w/ Contrast w/ Doppler (12.03.23 @ 16:35) >  Summary:   1. Normal global left ventricular systolic function.   2. LV Ejection Fraction by Spencer's Method with a biplane EF of 70 %.   3. Mildly increased LV wall thickness.   4. The left ventricular diastolic function could not be assessed in this   study.   5. Normal left atrial size.   6. Normal right atrial size.   7. Trace mitral valve regurgitation.   8. Mild tricuspid regurgitation.   9. Sclerotic aortic valve with decreased opening.  10. Moderate aortic stenosis, peak/mean PG 68/26 mmHg, [place] 1.3 cm^2.  11. Dilatation of the ascending aorta.  12. Endocardial visualization was enhanced with intravenous echo contrast.    < end of copied text >  
24H events:    Patient is a 58y old Male who presents with a chief complaint of Shortness of breath (04 Dec 2023 20:34)    Primary diagnosis of Heart failure    Today is hospital day 4d. This morning patient was seen and examined at bedside, resting comfortably in bed.    No acute or major events overnight. Denies chest pain SOB n/v, voiding normally and having normal bowel movements.     Code Status: full code    Family communication:  Contact date:  Name of person contacted:  Relationship to patient:  Communication details:  What matters most:    PAST MEDICAL & SURGICAL HISTORY  CAD S/P percutaneous coronary angioplasty    DM (diabetes mellitus)    Hyperlipidemia    HTN (hypertension)      SOCIAL HISTORY:  Social History:      ALLERGIES:  No Known Allergies    MEDICATIONS:  STANDING MEDICATIONS  albuterol/ipratropium for Nebulization 3 milliLiter(s) Nebulizer every 6 hours  apixaban 5 milliGRAM(s) Oral every 12 hours  atorvastatin 80 milliGRAM(s) Oral at bedtime  clopidogrel Tablet 75 milliGRAM(s) Oral daily  dextrose 5%. 1000 milliLiter(s) IV Continuous <Continuous>  dextrose 5%. 1000 milliLiter(s) IV Continuous <Continuous>  dextrose 50% Injectable 25 Gram(s) IV Push once  dextrose 50% Injectable 12.5 Gram(s) IV Push once  dextrose 50% Injectable 25 Gram(s) IV Push once  enalapril 20 milliGRAM(s) Oral daily  famotidine    Tablet 40 milliGRAM(s) Oral two times a day  furosemide   Injectable 40 milliGRAM(s) IV Push daily  gemfibrozil 600 milliGRAM(s) Oral two times a day  glucagon  Injectable 1 milliGRAM(s) IntraMuscular once  hydrochlorothiazide 25 milliGRAM(s) Oral daily  insulin glargine Injectable (LANTUS) 35 Unit(s) SubCutaneous at bedtime  insulin lispro (ADMELOG) corrective regimen sliding scale   SubCutaneous three times a day before meals  insulin lispro Injectable (ADMELOG) 7 Unit(s) SubCutaneous three times a day before meals  metoprolol tartrate 25 milliGRAM(s) Oral two times a day  spironolactone 25 milliGRAM(s) Oral daily  tamsulosin 0.4 milliGRAM(s) Oral at bedtime    PRN MEDICATIONS  dextrose Oral Gel 15 Gram(s) Oral once PRN    VITALS:   T(F): 98.5  HR: 79  BP: 135/59  RR: 18  SpO2: --    PHYSICAL EXAM:   GENERAL: NAD, lying in bed comfortably  HEAD:  Atraumatic, Normocephalic  EYES: EOMI, sclera clear  ENT: Moist mucous membranes  NECK: Supple, trachea midline  CHEST/LUNG: Clear to auscultation anteriorly bilaterally; No rales, rhonchi, wheezing, or rubs. Unlabored respirations  HEART: Regular rate and rhythm; No appreciable murmurs, rubs, or gallops  ABDOMEN: (+)BS; Soft, nontender, nondistended  EXTREMITIES: No clubbing, cyanosis, or edema  NERVOUS SYSTEM:  A&Ox3, no noted facial droop. Moving all extremities w/o difficulty.   SKIN: No rashes or lesions to b/l forearm, face.     (  ) Indwelling Young Catheter:   Date insterted:    Reason (  ) Critical illness     (  ) urinary retention    (  ) Accurate Ins/Outs Monitoring     (  ) CMO patient    (  ) Central Line:   Date inserted:  Location: (  ) Right IJ     (  ) Left IJ     (  ) Right Fem     (  ) Left Fem    (  ) SPC        (  ) pigtail       (  ) PEG tube       (  ) colostomy       (  ) jejunostomy  (  ) U-Dall    LABS:                        12.9   4.47  )-----------( 216      ( 05 Dec 2023 05:43 )             40.4     12-05    129<L>  |  94<L>  |  18  ----------------------------<  206<H>  4.3   |  26  |  1.0    Ca    9.0      05 Dec 2023 05:43  Mg     2.0     12-05    TPro  6.4  /  Alb  3.9  /  TBili  0.5  /  DBili  x   /  AST  17  /  ALT  27  /  AlkPhos  64  12-05      Urinalysis Basic - ( 05 Dec 2023 05:43 )    Color: x / Appearance: x / SG: x / pH: x  Gluc: 206 mg/dL / Ketone: x  / Bili: x / Urobili: x   Blood: x / Protein: x / Nitrite: x   Leuk Esterase: x / RBC: x / WBC x   Sq Epi: x / Non Sq Epi: x / Bacteria: x                RADIOLOGY:  < from: Xray Chest 1 View- PORTABLE-Routine (Xray Chest 1 View- PORTABLE-Routine .) (12.04.23 @ 15:48) >  Findings/  impression:    Support devices: None    Cardiac/ Mediastinum: unremarkable    Lungs/ Pleura: Diminishing opacities. No pneumothorax    Skeletal/ soft tissues: Stable    --- End of Report ---      CLAIRE HERNANDEZ MD; Attending Radiologist  This document has been electronically signed. Dec  5 2023  8:35AM    < end of copied text >          
INTERVAL HPI/OVERNIGHT EVENTS:  patient was originally seen 12/2 for pause while asleep  EP signed off rec SARIKA work up  EP recalled  for tachycardia  Metoprolol XL 200mg was stopped after 12/2 night pause  had 2.7se pause last night    MEDICATIONS  (STANDING):  albuterol/ipratropium for Nebulization 3 milliLiter(s) Nebulizer every 6 hours  aspirin  chewable 81 milliGRAM(s) Oral daily  atorvastatin 80 milliGRAM(s) Oral at bedtime  clopidogrel Tablet 75 milliGRAM(s) Oral daily  dextrose 5%. 1000 milliLiter(s) (100 mL/Hr) IV Continuous <Continuous>  dextrose 5%. 1000 milliLiter(s) (50 mL/Hr) IV Continuous <Continuous>  dextrose 50% Injectable 25 Gram(s) IV Push once  dextrose 50% Injectable 25 Gram(s) IV Push once  dextrose 50% Injectable 12.5 Gram(s) IV Push once  enalapril 20 milliGRAM(s) Oral daily  enoxaparin Injectable 40 milliGRAM(s) SubCutaneous every 24 hours  famotidine    Tablet 40 milliGRAM(s) Oral two times a day  furosemide   Injectable 40 milliGRAM(s) IV Push daily  gemfibrozil 600 milliGRAM(s) Oral two times a day  glucagon  Injectable 1 milliGRAM(s) IntraMuscular once  hydrochlorothiazide 25 milliGRAM(s) Oral daily  insulin glargine Injectable (LANTUS) 35 Unit(s) SubCutaneous at bedtime  insulin lispro (ADMELOG) corrective regimen sliding scale   SubCutaneous three times a day before meals  insulin lispro Injectable (ADMELOG) 7 Unit(s) SubCutaneous three times a day before meals  spironolactone 25 milliGRAM(s) Oral daily  tamsulosin 0.4 milliGRAM(s) Oral at bedtime    MEDICATIONS  (PRN):  dextrose Oral Gel 15 Gram(s) Oral once PRN Blood Glucose LESS THAN 70 milliGRAM(s)/deciliter      Allergies    No Known Allergies    Intolerances        REVIEW OF SYSTEMS    [ ] A ten-point review of systems was otherwise negative except as noted.  [ ] Due to altered mental status/intubation, subjective information were not able to be obtained from the patient. History was obtained, to the extent possible, from review of the chart and collateral sources of information.      Vital Signs Last 24 Hrs  T(C): 36.2 (03 Dec 2023 20:59), Max: 36.2 (03 Dec 2023 20:59)  T(F): 97.2 (03 Dec 2023 20:59), Max: 97.2 (03 Dec 2023 20:59)  HR: 84 (04 Dec 2023 05:25) (81 - 85)  BP: 165/77 (04 Dec 2023 05:25) (136/61 - 165/77)  BP(mean): 100 (03 Dec 2023 16:37) (100 - 100)  RR: 18 (04 Dec 2023 08:16) (18 - 18)  SpO2: 99% (04 Dec 2023 08:16) (99% - 99%)    Parameters below as of 04 Dec 2023 08:16  Patient On (Oxygen Delivery Method): room air        12-03-23 @ 07:01  -  12-04-23 @ 07:00  --------------------------------------------------------  IN: 1170 mL / OUT: 5150 mL / NET: -3980 mL    12-04-23 @ 07:01  -  12-04-23 @ 13:15  --------------------------------------------------------  IN: 210 mL / OUT: 0 mL / NET: 210 mL        PHYSICAL EXAM:    GENERAL: In no apparent distress, well nourished, and hydrated.  HEART: Regular rate and rhythm; No murmur; NO rubs, or gallops.  PULMONARY: Clear to auscultation and percussion.  Normal expansion/effort. No rales, wheezing, or rhonchi bilaterally.  ABDOMEN: obese, Soft, Nontender, Nondistended; Bowel sounds present  EXTREMITIES:  Extremities warm, pink, well-perfused, 2+ Peripheral Pulses, No clubbing, cyanosis, or edema  NEUROLOGICAL: alert & oriented x 3, no focal deficits, PERRLA, EOMI    LABS:                        12.5   4.33  )-----------( 214      ( 04 Dec 2023 05:02 )             38.8     12-04    132<L>  |  97<L>  |  20  ----------------------------<  194<H>  4.3   |  23  |  0.9    Ca    9.2      04 Dec 2023 05:02  Mg     2.0     12-04    TPro  6.9  /  Alb  4.2  /  TBili  0.4  /  DBili  x   /  AST  21  /  ALT  32  /  AlkPhos  68  12-04                RADIOLOGY & ADDITIONAL TESTS:    < from: TTE Echo Complete w/ Contrast w/ Doppler (12.03.23 @ 16:35) >  Summary:   1. Normal global left ventricular systolic function.   2. LV Ejection Fraction by Spencer's Method with a biplane EF of 70 %.   3. Mildly increased LV wall thickness.   4. The left ventricular diastolic function could not be assessed in this   study.   5. Normal left atrial size.   6. Normal right atrial size.   7. Trace mitral valve regurgitation.   8. Mild tricuspid regurgitation.   9. Sclerotic aortic valve with decreased opening.  10. Moderate aortic stenosis, peak/mean PG 68/26 mmHg, [place] 1.3 cm^2.  11. Dilatation of the ascending aorta.  12. Endocardial visualization was enhanced with intravenous echo contrast.    < end of copied text >    
SUBJ: Patient seen and examined.      MEDICATIONS  (STANDING):  albuterol/ipratropium for Nebulization 3 milliLiter(s) Nebulizer every 6 hours  apixaban 5 milliGRAM(s) Oral every 12 hours  atorvastatin 80 milliGRAM(s) Oral at bedtime  clopidogrel Tablet 75 milliGRAM(s) Oral daily  dextrose 5%. 1000 milliLiter(s) (50 mL/Hr) IV Continuous <Continuous>  dextrose 5%. 1000 milliLiter(s) (100 mL/Hr) IV Continuous <Continuous>  dextrose 50% Injectable 25 Gram(s) IV Push once  dextrose 50% Injectable 25 Gram(s) IV Push once  dextrose 50% Injectable 12.5 Gram(s) IV Push once  enalapril 20 milliGRAM(s) Oral daily  famotidine    Tablet 40 milliGRAM(s) Oral two times a day  furosemide    Tablet 40 milliGRAM(s) Oral daily  gemfibrozil 600 milliGRAM(s) Oral two times a day  glucagon  Injectable 1 milliGRAM(s) IntraMuscular once  insulin glargine Injectable (LANTUS) 35 Unit(s) SubCutaneous at bedtime  insulin lispro (ADMELOG) corrective regimen sliding scale   SubCutaneous three times a day before meals  insulin lispro Injectable (ADMELOG) 7 Unit(s) SubCutaneous three times a day before meals  metoprolol tartrate 50 milliGRAM(s) Oral two times a day  spironolactone 25 milliGRAM(s) Oral daily  tamsulosin 0.4 milliGRAM(s) Oral at bedtime    MEDICATIONS  (PRN):  dextrose Oral Gel 15 Gram(s) Oral once PRN Blood Glucose LESS THAN 70 milliGRAM(s)/deciliter            Vital Signs Last 24 Hrs  T(C): 35.9 (06 Dec 2023 20:06), Max: 36 (06 Dec 2023 05:03)  T(F): 96.7 (06 Dec 2023 20:06), Max: 96.8 (06 Dec 2023 05:03)  HR: 99 (06 Dec 2023 20:06) (82 - 100)  BP: 107/62 (06 Dec 2023 20:06) (97/57 - 150/92)  BP(mean): 113 (06 Dec 2023 14:56) (113 - 113)  RR: 18 (06 Dec 2023 20:06) (18 - 18)  SpO2: --         REVIEW OF SYSTEMS:  CONSTITUTIONAL: No fever  NECK: No pain or stiffness  CARDIOVASCULAR: patient denies chest pain, shortness of breath or palpitations .  Respiratory: No cough or wheezing.  NEUROLOGICAL: No focal deficits to report.  GI: no BRBPR, no N,V,diarrhea.    PSYCHIATRY: normal mood and affect  HEENT: no nasal discharge, no ecchymosis  SKIN: no ecchymosis, no breakdown  MUSCULOSKELETAL: Full range of motion x4.      PHYSICAL EXAM:  GENERAL: NAD  HEAD:  Atraumatic, Normocephalic  NECK: Supple, No JVD  NERVOUS SYSTEM:  Alert & Oriented X3, Good concentration  CHEST/LUNG: Clear to anterior auscultation bilaterally  HEART: Regular rate and rhythm; KATIE  ABDOMEN: Soft, Nontender, Nondistended;   EXTREMITIES:  No  edema  SKIN: No rashes or lesions  Psych: Appropriate mood and affect     	  TELEMETRY:      LABS:                        13.1   4.62  )-----------( 223      ( 06 Dec 2023 06:08 )             41.1     12-06    132<L>  |  97<L>  |  23<H>  ----------------------------<  242<H>  4.7   |  25  |  1.1    Ca    9.4      06 Dec 2023 06:08  Mg     2.2     12-06    TPro  6.9  /  Alb  4.1  /  TBili  0.4  /  DBili  x   /  AST  26  /  ALT  41  /  AlkPhos  71  12-06            I&O's Summary    05 Dec 2023 07:01  -  06 Dec 2023 07:00  --------------------------------------------------------  IN: 790 mL / OUT: 3100 mL / NET: -2310 mL    06 Dec 2023 07:01  -  06 Dec 2023 23:04  --------------------------------------------------------  IN: 880 mL / OUT: 5450 mL / NET: -4570 mL      BNP  RADIOLOGY & ADDITIONAL STUDIES:    IMPRESSION AND PLAN:    AFib RVR  HFpEF  Moderate AS  CAD s/p PCI to LCX  SARIKA  Sinus pause at night secondary to sleep apnea    - Management as per primary team  - Patient appears hypovolumic / BUN and Crt rising / Please consider holding lasix  - Please titrate Metoprolol dose up for rate control  - Patient will need CPAP at home on discharge   - Continue Eliquis and Plavix  - Will follow  
CHIEF COMPLAINT:    Patient is a 58y old  Male who presents with a chief complaint of Shortness of breath    INTERVAL HPI/OVERNIGHT EVENTS:    Patient seen and examined at bedside. No acute overnight events occurred.    ROS: Denies SOB. All other systems are negative.    Medications:  Standing  albuterol/ipratropium for Nebulization 3 milliLiter(s) Nebulizer every 6 hours  aspirin  chewable 81 milliGRAM(s) Oral daily  atorvastatin 80 milliGRAM(s) Oral at bedtime  clopidogrel Tablet 75 milliGRAM(s) Oral daily  dextrose 5%. 1000 milliLiter(s) IV Continuous <Continuous>  dextrose 5%. 1000 milliLiter(s) IV Continuous <Continuous>  dextrose 50% Injectable 25 Gram(s) IV Push once  dextrose 50% Injectable 12.5 Gram(s) IV Push once  dextrose 50% Injectable 25 Gram(s) IV Push once  enalapril 20 milliGRAM(s) Oral daily  enoxaparin Injectable 40 milliGRAM(s) SubCutaneous every 24 hours  famotidine    Tablet 40 milliGRAM(s) Oral two times a day  furosemide   Injectable 40 milliGRAM(s) IV Push daily  gemfibrozil 600 milliGRAM(s) Oral two times a day  glucagon  Injectable 1 milliGRAM(s) IntraMuscular once  hydrochlorothiazide 25 milliGRAM(s) Oral daily  insulin glargine Injectable (LANTUS) 35 Unit(s) SubCutaneous at bedtime  insulin lispro (ADMELOG) corrective regimen sliding scale   SubCutaneous three times a day before meals  insulin lispro Injectable (ADMELOG) 7 Unit(s) SubCutaneous three times a day before meals  spironolactone 25 milliGRAM(s) Oral daily  tamsulosin 0.4 milliGRAM(s) Oral at bedtime    PRN Meds  dextrose Oral Gel 15 Gram(s) Oral once PRN        Vital Signs:    T(F): 98.5 (12-03-23 @ 04:41), Max: 98.8 (12-02-23 @ 20:00)  HR: 73 (12-03-23 @ 11:45) (73 - 89)  BP: 144/67 (12-03-23 @ 11:45) (131/59 - 144/67)  RR: 18 (12-03-23 @ 07:51) (17 - 18)  SpO2: 98% (12-03-23 @ 07:51) (95% - 100%)  I&O's Summary    02 Dec 2023 07:01  -  03 Dec 2023 07:00  --------------------------------------------------------  IN: 1391 mL / OUT: 5020 mL / NET: -3629 mL    03 Dec 2023 07:01  -  03 Dec 2023 16:20  --------------------------------------------------------  IN: 450 mL / OUT: 3650 mL / NET: -3200 mL      Daily     Daily   CAPILLARY BLOOD GLUCOSE      POCT Blood Glucose.: 210 mg/dL (03 Dec 2023 11:20)  POCT Blood Glucose.: 175 mg/dL (03 Dec 2023 07:33)  POCT Blood Glucose.: 196 mg/dL (02 Dec 2023 21:05)  POCT Blood Glucose.: 257 mg/dL (02 Dec 2023 16:23)      PHYSICAL EXAM:  GENERAL:  NAD  SKIN: No rashes or lesions  HEENT: Atraumatic. Normocephalic. Anicteric  NECK:  No JVD.   PULMONARY: Clear to ausculation bilaterally. No wheezing. No rales  CVS: Normal S1, S2. Regular rate and rhythm. + murmurs.  ABDOMEN/GI: Soft, Nontender, Nondistended; Bowel sounds are present  EXTREMITIES:  Less tense skin  NEUROLOGIC:  No motor deficit.  PSYCH: Alert & oriented x 3, normal affect      LABS:                        12.6   3.55  )-----------( 209      ( 03 Dec 2023 07:20 )             38.5     12-03    132<L>  |  94<L>  |  15  ----------------------------<  185<H>  4.2   |  24  |  0.9    Ca    9.3      03 Dec 2023 07:20  Mg     1.8     12-03    TPro  6.9  /  Alb  4.2  /  TBili  0.6  /  DBili  x   /  AST  21  /  ALT  36  /  AlkPhos  68  12-03        Trop <0.01, CKMB --, CK --, 12-01-23 @ 19:58  Trop <0.01, CKMB --, CK --, 12-01-23 @ 16:16  Trop <0.01, CKMB --, CK --, 12-01-23 @ 04:45        RADIOLOGY & ADDITIONAL TESTS:  Imaging or report Personally Reviewed:  [ ] YES  [ ] NO -->no new images    Telemetry reviewed independently - NSR, no acute events  EKG reviewed independently -->no new EKGs    Consultant(s) Notes Reviewed:  [ ] YES  [ ] NO  Care Discussed with Consultants/Other Providers [ ] YES  [ ] NO    Case discussed with resident  Care discussed with pt

## 2023-12-07 NOTE — PROGRESS NOTE ADULT - PROVIDER SPECIALTY LIST ADULT
Cardiology
Electrophysiology
Internal Medicine
Patient: Yon Carney Date: 2019   : 2009 MR#: [id number]   10 year old male Start time/Stop time: 6:00-6:30P     Chief complaint: \"He is doing very well. No call from school\"  Risk assessment: Low  ALLERGIES:   Allergen Reactions   • Bee Sting Other (See Comments)     Never stung as of 2019; allergy PRESUMED due to mom and MGM having severe (?anaphylactic) reactions to bee stings.    • Bee Venom Other (See Comments)     Never stung as of 2019; allergy PRESUMED due to mom and MGM having severe (?anaphylactic) reactions to bee stings.    • Penicillins DIARRHEA     Current Outpatient Medications   Medication Sig Dispense Refill   • guanFACINE 4 MG extended-release tablet Take 1 tablet by mouth daily. 30 tablet 5   • ARIPiprazole (ABILIFY) 5 MG tablet Take 0.5 tablet by mouth daily in the morning and 1 tablet at bedtime 45 tablet 5   • lamoTRIgine (LAMICTAL) 100 MG tablet Take 1 tablet by mouth daily. 30 tablet 5   • albuterol (PROAIR HFA) 108 (90 Base) MCG/ACT inhaler Inhale 2 puffs into the lungs every 6 hours as needed for Shortness of Breath or Wheezing. 8.5 g 1   • fluticasone (FLONASE) 50 MCG/ACT nasal spray Spray 1 spray in each nostril daily. 16 g 6   • fluticasone (FLOVENT HFA) 44 MCG/ACT inhaler Inhale 1 puff into the lungs 2 times daily. 10.6 g 3   • EPINEPHrine 0.3 MG/0.3ML auto-injector Inject 0.3 mLs into the muscle 1 time for 1 dose. 2 each 1   • loratadine (CLARITIN) 10 MG tablet Take 1 tablet by mouth daily. 30 tablet 3   • polyethylene glycol (MIRALAX) powder Give 1 cap (17gms) of powder mixed in 8 oz of clear liquid once daily as needed for constipation. Cut dose by 1/2 if stools become too soft. 255 g 1   • triamcinolone (ARISTOCORT) 0.1 % cream APPLY SPARINGLY TO AFFECTED AREAS 3 TIMES DAILY 30 g 1     No current facility-administered medications for this visit.      Update on School/Work/home:   Yon, 10 year old male who was seen for outpatient medication management with 
Cardiology
parent.   Parent and patient report medications have been effective. No calls from school no behavior concerns at home. Patient is less angry and mood is stable. No negative side effects reported with medications. No adjustments desired at this visit.    Mental Status Exam:  General Appearance: Unremarkable.   Behavior: calm  Orientation: Fully oriented.  Affect: Appropriate  Mood: Neutral  Motor Activity: Neutral  Gait/Posture: Normal at baseline.  Thought Processes/content: Appropriate  Psychotic: None  Speech: normal amount, volume, rate, rhythm, prosody, fluency, and latency  Suicidal Ideations:denied  Homicidal Ideations: Absent  Memory: Intact.           Attention Span/Concentration: good  Judgement:fair-good, based on recent decisions.  Insight: good  Motivation: good    Current Diagnosis:   F90.2 ADHD (attention deficit hyperactivity disorder), combined type  (primary encounter diagnosis)  F39 Mood disorder (CMS/[facility])  F91.3 Oppositional defiant disorder  F91.3 Oppositional defiant behavior    Treatment plan: We discussed treatment options and collaboratively developed a plan to:  - continue Abilify 2.5 mg in AM and 5 mg at Hs, Lamictal 100 mg and Guanfacine 4 mg extended release.   - Follow up in 4-6 months.    Teaching was reinforced on medication, schedule, potential side effects and also the possibility of tapering medications up or down based on progress. Parent and patient were receptive to medication therapy.     Patient was advised to contact office in case of urgent matters, after hours, on holidays, and weekends at [phone].   In case of life-threatening emergency call 911.    Pati Land, MSN, PMHNP-BC.  
Cardiology
Cardiology
Internal Medicine
Internal Medicine
Hospitalist
Hospitalist
Internal Medicine
Internal Medicine

## 2023-12-07 NOTE — PROGRESS NOTE ADULT - ASSESSMENT
59 yo M PMHx HTN, DM II, BPH, and CAD s/p PCI, presented to the ED for evaluation of dyspnea on exertion. Patient endorses URI symptoms in last week. Patient endorsed worsening shortness of breath and persistent cough in the last few days. States he is more dyspneic with ambulation, and also more short of breath in the mornings when he first wakes up. Also endorses worsening LE edema recently.       Dyspnea on exertion  Acute Hypoxic Respiratory Failure   - suspected due to acute on chronic HFpEF  - pneumonia suspected on admission but absence of cough, fever, leukocytosis and procalcitonin 0.06 suggests against PNA  - In the ED, pulse oxygenation was 88%, increased to 97% on 3L NC, after receiving albuterol and IV lasix further suggesting chf exacerbation  - RVP negative  - continue off abx and monitor  - switch to PO lasix  - echo showed moderate AS   - . Likely falsely low due to obesity  - remains net negatrive  - history of smoking, agreeable to cessation aid on d/c with chantix or patches    New murmur  - likely from moderate AS  - outpt follow    Sinus pause  - as per pt he was diagnosed with SARIKA by his PMD. Never had formal sleep study test, does not use CPAP or bipap at home  - increase metoprolol to 50 BID  - Dc hydrochlorothiazide  - appreciate pulm eval for CPAP recs  - on 12/4  patient had 30 second arrest EP initially thought arrest-later suspected to be artifact  - MCOT on DC   - case d/w EP who is evaluating this    New onset afib  - afib RVR noted today  - Eliquis started-pt aware of risks of bleeding vs benefits  - metoprolol started as per EP-need to uptitrate as RVR  - was previously on toprol 200 mg but had pauses    Suspected sleep apnea  - as per CM patient cannot get home cpap without sleep study  - ABG CO2 40, does not qualify by this  - needs outpt sleep study    HTN  - C/w enalapril  - d/c HCTZ    DLD  - C/w atorvastatin, and gemfibrozil    BPH  - C/w tamsulosin    CAD s/ PCI  - C/w aspirin, Plavix, and atorvastatin 80     DM II  - c/w insulin  - FS controlled    DVT ppx: Lovenox 40 mg qday 57 yo M PMHx HTN, DM II, BPH, and CAD s/p PCI, presented to the ED for evaluation of dyspnea on exertion. Patient endorses URI symptoms in last week. Patient endorsed worsening shortness of breath and persistent cough in the last few days. States he is more dyspneic with ambulation, and also more short of breath in the mornings when he first wakes up. Also endorses worsening LE edema recently.       Dyspnea on exertion  Acute Hypoxic Respiratory Failure   - suspected due to acute on chronic HFpEF  - pneumonia suspected on admission but absence of cough, fever, leukocytosis and procalcitonin 0.06 suggests against PNA  - In the ED, pulse oxygenation was 88%, increased to 97% on 3L NC, after receiving albuterol and IV lasix further suggesting chf exacerbation  - RVP negative  - continue off abx and monitor  - switch to PO lasix  - echo showed moderate AS   - . Likely falsely low due to obesity  - remains net negatrive  - history of smoking, agreeable to cessation aid on d/c with chantix or patches    New murmur  - likely from moderate AS  - outpt follow    Sinus pause  - as per pt he was diagnosed with SARIKA by his PMD. Never had formal sleep study test, does not use CPAP or bipap at home  - increase metoprolol to 50 BID  - Dc hydrochlorothiazide  - appreciate pulm eval for CPAP recs  - on 12/4  patient had 30 second arrest EP initially thought arrest-later suspected to be artifact  - possible MCOT before DC, if not pt agreeable to outpt placement  - case d/w EP who is evaluating this    New onset afib  - afib RVR noted today  - Eliquis started-pt aware of risks of bleeding vs benefits  - metoprolol started as per EP-need to uptitrate as RVR  - was previously on toprol 200 mg but had pauses    Suspected sleep apnea  - as per CM patient cannot get home cpap without sleep study  - ABG CO2 40, does not qualify by this  - needs outpt sleep study    HTN  - C/w enalapril  - d/c HCTZ    DLD  - C/w atorvastatin, and gemfibrozil    BPH  - C/w tamsulosin    CAD s/ PCI  - C/w aspirin, Plavix, and atorvastatin 80     DM II  - c/w insulin  - FS controlled    DVT ppx: Lovenox 40 mg qday 59 yo M PMHx HTN, DM II, BPH, and CAD s/p PCI, presented to the ED for evaluation of dyspnea on exertion. Patient endorses URI symptoms in last week. Patient endorsed worsening shortness of breath and persistent cough in the last few days. States he is more dyspneic with ambulation, and also more short of breath in the mornings when he first wakes up. Also endorses worsening LE edema recently.       Dyspnea on exertion  Acute Hypoxic Respiratory Failure   - suspected due to acute on chronic HFpEF  - pneumonia suspected on admission but absence of cough, fever, leukocytosis and procalcitonin 0.06 suggests against PNA  - In the ED, pulse oxygenation was 88%, increased to 97% on 3L NC, after receiving albuterol and IV lasix further suggesting chf exacerbation  - RVP negative  - continue off abx and monitor  - switch to PO lasix  - echo showed moderate AS   - . Likely falsely low due to obesity  - remains net negatrive  - history of smoking, agreeable to cessation aid on d/c with chantix or patches    New murmur  - likely from moderate AS  - outpt follow    Sinus pause  - as per pt he was diagnosed with SARIKA by his PMD. Never had formal sleep study test, does not use CPAP or bipap at home  - increase metoprolol to 50 BID  - Dc hydrochlorothiazide  - appreciate pulm eval for CPAP recs  - on 12/4  patient had 30 second arrest EP initially thought arrest-later suspected to be artifact  - possible MCOT before DC, if not pt agreeable to outpt placement  - case d/w EP who is evaluating this    New onset afib  - afib RVR noted today  - Eliquis started-pt aware of risks of bleeding vs benefits  - metoprolol started as per EP-need to uptitrate as RVR  - was previously on toprol 200 mg but had pauses    Suspected sleep apnea  - as per CM patient cannot get home cpap without sleep study  - ABG CO2 40, does not qualify by this  - needs outpt sleep study    HTN  - C/w enalapril  - d/c HCTZ    DLD  - C/w atorvastatin, and gemfibrozil    BPH  - C/w tamsulosin    CAD s/ PCI  - C/w aspirin, Plavix, and atorvastatin 80     DM II  - c/w insulin  - FS controlled    DVT ppx: Lovenox 40 mg qday

## 2023-12-12 ENCOUNTER — APPOINTMENT (OUTPATIENT)
Dept: PULMONOLOGY | Facility: CLINIC | Age: 58
End: 2023-12-12
Payer: MEDICAID

## 2023-12-12 VITALS
SYSTOLIC BLOOD PRESSURE: 144 MMHG | BODY MASS INDEX: 44.43 KG/M2 | HEIGHT: 69 IN | HEART RATE: 131 BPM | RESPIRATION RATE: 16 BRPM | DIASTOLIC BLOOD PRESSURE: 84 MMHG | WEIGHT: 300 LBS | OXYGEN SATURATION: 96 %

## 2023-12-12 DIAGNOSIS — J96.01 ACUTE RESPIRATORY FAILURE WITH HYPOXIA: ICD-10-CM

## 2023-12-12 DIAGNOSIS — Z79.02 LONG TERM (CURRENT) USE OF ANTITHROMBOTICS/ANTIPLATELETS: ICD-10-CM

## 2023-12-12 DIAGNOSIS — Z79.4 LONG TERM (CURRENT) USE OF INSULIN: ICD-10-CM

## 2023-12-12 DIAGNOSIS — F17.200 NICOTINE DEPENDENCE, UNSPECIFIED, UNCOMPLICATED: ICD-10-CM

## 2023-12-12 DIAGNOSIS — R00.1 BRADYCARDIA, UNSPECIFIED: ICD-10-CM

## 2023-12-12 DIAGNOSIS — I25.10 ATHEROSCLEROTIC HEART DISEASE OF NATIVE CORONARY ARTERY WITHOUT ANGINA PECTORIS: ICD-10-CM

## 2023-12-12 DIAGNOSIS — Z79.82 LONG TERM (CURRENT) USE OF ASPIRIN: ICD-10-CM

## 2023-12-12 DIAGNOSIS — J81.1 CHRONIC PULMONARY EDEMA: ICD-10-CM

## 2023-12-12 DIAGNOSIS — N40.0 BENIGN PROSTATIC HYPERPLASIA WITHOUT LOWER URINARY TRACT SYMPTOMS: ICD-10-CM

## 2023-12-12 DIAGNOSIS — I50.33 ACUTE ON CHRONIC DIASTOLIC (CONGESTIVE) HEART FAILURE: ICD-10-CM

## 2023-12-12 DIAGNOSIS — J44.9 CHRONIC OBSTRUCTIVE PULMONARY DISEASE, UNSPECIFIED: ICD-10-CM

## 2023-12-12 DIAGNOSIS — Z79.899 OTHER LONG TERM (CURRENT) DRUG THERAPY: ICD-10-CM

## 2023-12-12 DIAGNOSIS — E78.5 HYPERLIPIDEMIA, UNSPECIFIED: ICD-10-CM

## 2023-12-12 DIAGNOSIS — R00.0 TACHYCARDIA, UNSPECIFIED: ICD-10-CM

## 2023-12-12 DIAGNOSIS — Z11.52 ENCOUNTER FOR SCREENING FOR COVID-19: ICD-10-CM

## 2023-12-12 DIAGNOSIS — E87.1 HYPO-OSMOLALITY AND HYPONATREMIA: ICD-10-CM

## 2023-12-12 DIAGNOSIS — Z86.79 PERSONAL HISTORY OF OTHER DISEASES OF THE CIRCULATORY SYSTEM: ICD-10-CM

## 2023-12-12 DIAGNOSIS — R29.818 OTHER SYMPTOMS AND SIGNS INVOLVING THE NERVOUS SYSTEM: ICD-10-CM

## 2023-12-12 DIAGNOSIS — E66.01 MORBID (SEVERE) OBESITY DUE TO EXCESS CALORIES: ICD-10-CM

## 2023-12-12 DIAGNOSIS — E11.9 TYPE 2 DIABETES MELLITUS WITHOUT COMPLICATIONS: ICD-10-CM

## 2023-12-12 DIAGNOSIS — I11.0 HYPERTENSIVE HEART DISEASE WITH HEART FAILURE: ICD-10-CM

## 2023-12-12 DIAGNOSIS — I48.0 PAROXYSMAL ATRIAL FIBRILLATION: ICD-10-CM

## 2023-12-12 DIAGNOSIS — Z95.5 PRESENCE OF CORONARY ANGIOPLASTY IMPLANT AND GRAFT: ICD-10-CM

## 2023-12-12 DIAGNOSIS — I77.89 OTHER SPECIFIED DISORDERS OF ARTERIES AND ARTERIOLES: ICD-10-CM

## 2023-12-12 DIAGNOSIS — Z79.84 LONG TERM (CURRENT) USE OF ORAL HYPOGLYCEMIC DRUGS: ICD-10-CM

## 2023-12-12 DIAGNOSIS — Z86.39 PERSONAL HISTORY OF OTHER ENDOCRINE, NUTRITIONAL AND METABOLIC DISEASE: ICD-10-CM

## 2023-12-12 DIAGNOSIS — I35.0 NONRHEUMATIC AORTIC (VALVE) STENOSIS: ICD-10-CM

## 2023-12-12 DIAGNOSIS — I45.5 OTHER SPECIFIED HEART BLOCK: ICD-10-CM

## 2023-12-12 DIAGNOSIS — G47.33 OBSTRUCTIVE SLEEP APNEA (ADULT) (PEDIATRIC): ICD-10-CM

## 2023-12-12 PROCEDURE — 99204 OFFICE O/P NEW MOD 45 MIN: CPT

## 2023-12-12 RX ORDER — INSULIN ASPART 100 [IU]/ML
INJECTION, SOLUTION INTRAVENOUS; SUBCUTANEOUS
Refills: 0 | Status: ACTIVE | COMMUNITY

## 2023-12-12 RX ORDER — METOPROLOL TARTRATE 75 MG/1
TABLET, FILM COATED ORAL
Refills: 0 | Status: ACTIVE | COMMUNITY

## 2023-12-12 RX ORDER — DAPAGLIFLOZIN 10 MG/1
TABLET, FILM COATED ORAL
Refills: 0 | Status: ACTIVE | COMMUNITY

## 2023-12-12 RX ORDER — METFORMIN HYDROCHLORIDE 625 MG/1
TABLET ORAL
Refills: 0 | Status: ACTIVE | COMMUNITY

## 2023-12-18 ENCOUNTER — APPOINTMENT (OUTPATIENT)
Dept: PULMONOLOGY | Facility: HOSPITAL | Age: 58
End: 2023-12-18
Payer: MEDICAID

## 2023-12-18 ENCOUNTER — OUTPATIENT (OUTPATIENT)
Dept: OUTPATIENT SERVICES | Facility: HOSPITAL | Age: 58
LOS: 1 days | End: 2023-12-18
Payer: MEDICAID

## 2023-12-18 DIAGNOSIS — R06.02 SHORTNESS OF BREATH: ICD-10-CM

## 2023-12-18 PROCEDURE — 94729 DIFFUSING CAPACITY: CPT

## 2023-12-18 PROCEDURE — 94726 PLETHYSMOGRAPHY LUNG VOLUMES: CPT

## 2023-12-18 PROCEDURE — 94060 EVALUATION OF WHEEZING: CPT | Mod: 26

## 2023-12-18 PROCEDURE — 94729 DIFFUSING CAPACITY: CPT | Mod: 26

## 2023-12-18 PROCEDURE — 94664 DEMO&/EVAL PT USE INHALER: CPT

## 2023-12-18 PROCEDURE — 94727 GAS DIL/WSHOT DETER LNG VOL: CPT | Mod: 26

## 2023-12-18 PROCEDURE — 94070 EVALUATION OF WHEEZING: CPT

## 2023-12-19 DIAGNOSIS — R06.02 SHORTNESS OF BREATH: ICD-10-CM

## 2023-12-26 ENCOUNTER — OUTPATIENT (OUTPATIENT)
Dept: OUTPATIENT SERVICES | Facility: HOSPITAL | Age: 58
LOS: 1 days | Discharge: HOME | End: 2023-12-26
Payer: MEDICAID

## 2023-12-26 ENCOUNTER — APPOINTMENT (OUTPATIENT)
Dept: SLEEP CENTER | Facility: HOSPITAL | Age: 58
End: 2023-12-26
Payer: MEDICAID

## 2023-12-26 DIAGNOSIS — G47.33 OBSTRUCTIVE SLEEP APNEA (ADULT) (PEDIATRIC): ICD-10-CM

## 2023-12-26 PROCEDURE — 95800 SLP STDY UNATTENDED: CPT | Mod: 26

## 2023-12-26 PROCEDURE — 95800 SLP STDY UNATTENDED: CPT

## 2023-12-29 DIAGNOSIS — G47.33 OBSTRUCTIVE SLEEP APNEA (ADULT) (PEDIATRIC): ICD-10-CM

## 2024-01-18 ENCOUNTER — APPOINTMENT (OUTPATIENT)
Dept: PULMONOLOGY | Facility: CLINIC | Age: 59
End: 2024-01-18
Payer: MEDICAID

## 2024-01-18 DIAGNOSIS — J44.9 CHRONIC OBSTRUCTIVE PULMONARY DISEASE, UNSPECIFIED: ICD-10-CM

## 2024-01-18 DIAGNOSIS — G47.33 OBSTRUCTIVE SLEEP APNEA (ADULT) (PEDIATRIC): ICD-10-CM

## 2024-01-18 PROCEDURE — 99441: CPT

## 2024-01-18 NOTE — REASON FOR VISIT
[Home] : at home, [unfilled] , at the time of the visit. [Medical Office: ([facility])___] : at the medical office located in  [Follow-Up] : a follow-up visit [Sleep Apnea] : sleep apnea [Cough] : cough [Shortness of Breath] : shortness of breath

## 2024-01-22 ENCOUNTER — APPOINTMENT (OUTPATIENT)
Dept: ELECTROPHYSIOLOGY | Facility: CLINIC | Age: 59
End: 2024-01-22

## 2024-03-17 ENCOUNTER — INPATIENT (INPATIENT)
Facility: HOSPITAL | Age: 59
LOS: 1 days | Discharge: HOME | DRG: 194 | End: 2024-03-19
Attending: INTERNAL MEDICINE | Admitting: INTERNAL MEDICINE
Payer: MEDICAID

## 2024-03-17 VITALS
RESPIRATION RATE: 26 BRPM | TEMPERATURE: 97 F | DIASTOLIC BLOOD PRESSURE: 89 MMHG | WEIGHT: 300.05 LBS | HEART RATE: 115 BPM | SYSTOLIC BLOOD PRESSURE: 169 MMHG | OXYGEN SATURATION: 88 %

## 2024-03-17 DIAGNOSIS — I50.9 HEART FAILURE, UNSPECIFIED: ICD-10-CM

## 2024-03-17 LAB
ALBUMIN SERPL ELPH-MCNC: 3.9 G/DL — SIGNIFICANT CHANGE UP (ref 3.5–5.2)
ALP SERPL-CCNC: 87 U/L — SIGNIFICANT CHANGE UP (ref 30–115)
ALT FLD-CCNC: 23 U/L — SIGNIFICANT CHANGE UP (ref 0–41)
ANION GAP SERPL CALC-SCNC: 14 MMOL/L — SIGNIFICANT CHANGE UP (ref 7–14)
AST SERPL-CCNC: 27 U/L — SIGNIFICANT CHANGE UP (ref 0–41)
BASE EXCESS BLDV CALC-SCNC: 3.6 MMOL/L — HIGH (ref -2–3)
BASOPHILS # BLD AUTO: 0.04 K/UL — SIGNIFICANT CHANGE UP (ref 0–0.2)
BASOPHILS NFR BLD AUTO: 0.7 % — SIGNIFICANT CHANGE UP (ref 0–1)
BILIRUB SERPL-MCNC: 0.5 MG/DL — SIGNIFICANT CHANGE UP (ref 0.2–1.2)
BUN SERPL-MCNC: 16 MG/DL — SIGNIFICANT CHANGE UP (ref 10–20)
CA-I SERPL-SCNC: 1.15 MMOL/L — SIGNIFICANT CHANGE UP (ref 1.15–1.33)
CALCIUM SERPL-MCNC: 8.8 MG/DL — SIGNIFICANT CHANGE UP (ref 8.4–10.5)
CHLORIDE SERPL-SCNC: 101 MMOL/L — SIGNIFICANT CHANGE UP (ref 98–110)
CO2 SERPL-SCNC: 21 MMOL/L — SIGNIFICANT CHANGE UP (ref 17–32)
CREAT SERPL-MCNC: 0.9 MG/DL — SIGNIFICANT CHANGE UP (ref 0.7–1.5)
EGFR: 98 ML/MIN/1.73M2 — SIGNIFICANT CHANGE UP
EOSINOPHIL # BLD AUTO: 0.12 K/UL — SIGNIFICANT CHANGE UP (ref 0–0.7)
EOSINOPHIL NFR BLD AUTO: 2.2 % — SIGNIFICANT CHANGE UP (ref 0–8)
FLUAV AG NPH QL: SIGNIFICANT CHANGE UP
FLUBV AG NPH QL: SIGNIFICANT CHANGE UP
GAS PNL BLDV: 136 MMOL/L — SIGNIFICANT CHANGE UP (ref 136–145)
GAS PNL BLDV: SIGNIFICANT CHANGE UP
GLUCOSE BLDC GLUCOMTR-MCNC: 201 MG/DL — HIGH (ref 70–99)
GLUCOSE BLDC GLUCOMTR-MCNC: 84 MG/DL — SIGNIFICANT CHANGE UP (ref 70–99)
GLUCOSE SERPL-MCNC: 142 MG/DL — HIGH (ref 70–99)
HCO3 BLDV-SCNC: 30 MMOL/L — HIGH (ref 22–29)
HCT VFR BLD CALC: 39.4 % — LOW (ref 42–52)
HCT VFR BLDA CALC: 34 % — LOW (ref 39–51)
HGB BLD CALC-MCNC: 11.2 G/DL — LOW (ref 12.6–17.4)
HGB BLD-MCNC: 12.1 G/DL — LOW (ref 14–18)
IMM GRANULOCYTES NFR BLD AUTO: 0.4 % — HIGH (ref 0.1–0.3)
LACTATE BLDV-MCNC: 1.6 MMOL/L — SIGNIFICANT CHANGE UP (ref 0.5–2)
LYMPHOCYTES # BLD AUTO: 1.85 K/UL — SIGNIFICANT CHANGE UP (ref 1.2–3.4)
LYMPHOCYTES # BLD AUTO: 33.3 % — SIGNIFICANT CHANGE UP (ref 20.5–51.1)
MCHC RBC-ENTMCNC: 24.6 PG — LOW (ref 27–31)
MCHC RBC-ENTMCNC: 30.7 G/DL — LOW (ref 32–37)
MCV RBC AUTO: 80.1 FL — SIGNIFICANT CHANGE UP (ref 80–94)
MONOCYTES # BLD AUTO: 0.57 K/UL — SIGNIFICANT CHANGE UP (ref 0.1–0.6)
MONOCYTES NFR BLD AUTO: 10.3 % — HIGH (ref 1.7–9.3)
NEUTROPHILS # BLD AUTO: 2.95 K/UL — SIGNIFICANT CHANGE UP (ref 1.4–6.5)
NEUTROPHILS NFR BLD AUTO: 53.1 % — SIGNIFICANT CHANGE UP (ref 42.2–75.2)
NRBC # BLD: 0 /100 WBCS — SIGNIFICANT CHANGE UP (ref 0–0)
NT-PROBNP SERPL-SCNC: 1743 PG/ML — HIGH (ref 0–300)
PCO2 BLDV: 50 MMHG — SIGNIFICANT CHANGE UP (ref 42–55)
PH BLDV: 7.38 — SIGNIFICANT CHANGE UP (ref 7.32–7.43)
PLATELET # BLD AUTO: 264 K/UL — SIGNIFICANT CHANGE UP (ref 130–400)
PMV BLD: 10 FL — SIGNIFICANT CHANGE UP (ref 7.4–10.4)
PO2 BLDV: 44 MMHG — SIGNIFICANT CHANGE UP (ref 25–45)
POTASSIUM BLDV-SCNC: 4.2 MMOL/L — SIGNIFICANT CHANGE UP (ref 3.5–5.1)
POTASSIUM SERPL-MCNC: 4.6 MMOL/L — SIGNIFICANT CHANGE UP (ref 3.5–5)
POTASSIUM SERPL-SCNC: 4.6 MMOL/L — SIGNIFICANT CHANGE UP (ref 3.5–5)
PROT SERPL-MCNC: 7.2 G/DL — SIGNIFICANT CHANGE UP (ref 6–8)
RBC # BLD: 4.92 M/UL — SIGNIFICANT CHANGE UP (ref 4.7–6.1)
RBC # FLD: 17.1 % — HIGH (ref 11.5–14.5)
RSV RNA NPH QL NAA+NON-PROBE: SIGNIFICANT CHANGE UP
SAO2 % BLDV: 70.1 % — SIGNIFICANT CHANGE UP (ref 67–88)
SARS-COV-2 RNA SPEC QL NAA+PROBE: SIGNIFICANT CHANGE UP
SODIUM SERPL-SCNC: 136 MMOL/L — SIGNIFICANT CHANGE UP (ref 135–146)
TROPONIN T, HIGH SENSITIVITY RESULT: 23 NG/L — HIGH (ref 6–21)
WBC # BLD: 5.55 K/UL — SIGNIFICANT CHANGE UP (ref 4.8–10.8)
WBC # FLD AUTO: 5.55 K/UL — SIGNIFICANT CHANGE UP (ref 4.8–10.8)

## 2024-03-17 PROCEDURE — 71045 X-RAY EXAM CHEST 1 VIEW: CPT

## 2024-03-17 PROCEDURE — 85027 COMPLETE CBC AUTOMATED: CPT

## 2024-03-17 PROCEDURE — 36415 COLL VENOUS BLD VENIPUNCTURE: CPT

## 2024-03-17 PROCEDURE — 82962 GLUCOSE BLOOD TEST: CPT

## 2024-03-17 PROCEDURE — 83735 ASSAY OF MAGNESIUM: CPT

## 2024-03-17 PROCEDURE — 97161 PT EVAL LOW COMPLEX 20 MIN: CPT | Mod: GP

## 2024-03-17 PROCEDURE — 93005 ELECTROCARDIOGRAM TRACING: CPT

## 2024-03-17 PROCEDURE — 76604 US EXAM CHEST: CPT | Mod: 26

## 2024-03-17 PROCEDURE — 93306 TTE W/DOPPLER COMPLETE: CPT

## 2024-03-17 PROCEDURE — 84484 ASSAY OF TROPONIN QUANT: CPT

## 2024-03-17 PROCEDURE — 99291 CRITICAL CARE FIRST HOUR: CPT

## 2024-03-17 PROCEDURE — 93010 ELECTROCARDIOGRAM REPORT: CPT

## 2024-03-17 PROCEDURE — 99223 1ST HOSP IP/OBS HIGH 75: CPT

## 2024-03-17 PROCEDURE — 80048 BASIC METABOLIC PNL TOTAL CA: CPT

## 2024-03-17 PROCEDURE — 83036 HEMOGLOBIN GLYCOSYLATED A1C: CPT

## 2024-03-17 PROCEDURE — 85025 COMPLETE CBC W/AUTO DIFF WBC: CPT

## 2024-03-17 PROCEDURE — 80053 COMPREHEN METABOLIC PANEL: CPT

## 2024-03-17 PROCEDURE — 71045 X-RAY EXAM CHEST 1 VIEW: CPT | Mod: 26

## 2024-03-17 RX ORDER — SPIRONOLACTONE 25 MG/1
1 TABLET, FILM COATED ORAL
Refills: 0 | DISCHARGE

## 2024-03-17 RX ORDER — DEXTROSE 50 % IN WATER 50 %
25 SYRINGE (ML) INTRAVENOUS ONCE
Refills: 0 | Status: DISCONTINUED | OUTPATIENT
Start: 2024-03-17 | End: 2024-03-19

## 2024-03-17 RX ORDER — INFLUENZA VIRUS VACCINE 15; 15; 15; 15 UG/.5ML; UG/.5ML; UG/.5ML; UG/.5ML
0.5 SUSPENSION INTRAMUSCULAR ONCE
Refills: 0 | Status: DISCONTINUED | OUTPATIENT
Start: 2024-03-17 | End: 2024-03-19

## 2024-03-17 RX ORDER — SODIUM CHLORIDE 9 MG/ML
1000 INJECTION, SOLUTION INTRAVENOUS
Refills: 0 | Status: DISCONTINUED | OUTPATIENT
Start: 2024-03-17 | End: 2024-03-19

## 2024-03-17 RX ORDER — SPIRONOLACTONE 25 MG/1
25 TABLET, FILM COATED ORAL
Refills: 0 | Status: DISCONTINUED | OUTPATIENT
Start: 2024-03-17 | End: 2024-03-17

## 2024-03-17 RX ORDER — FAMOTIDINE 10 MG/ML
40 INJECTION INTRAVENOUS
Refills: 0 | Status: DISCONTINUED | OUTPATIENT
Start: 2024-03-17 | End: 2024-03-19

## 2024-03-17 RX ORDER — GEMFIBROZIL 600 MG
1 TABLET ORAL
Refills: 0 | DISCHARGE

## 2024-03-17 RX ORDER — GLUCAGON INJECTION, SOLUTION 0.5 MG/.1ML
1 INJECTION, SOLUTION SUBCUTANEOUS ONCE
Refills: 0 | Status: DISCONTINUED | OUTPATIENT
Start: 2024-03-17 | End: 2024-03-19

## 2024-03-17 RX ORDER — ASPIRIN/CALCIUM CARB/MAGNESIUM 324 MG
81 TABLET ORAL
Refills: 0 | DISCHARGE

## 2024-03-17 RX ORDER — SEMAGLUTIDE 0.68 MG/ML
0.25 INJECTION, SOLUTION SUBCUTANEOUS
Refills: 0 | DISCHARGE

## 2024-03-17 RX ORDER — INSULIN ASPART 100 [IU]/ML
22 INJECTION, SUSPENSION SUBCUTANEOUS
Refills: 0 | DISCHARGE

## 2024-03-17 RX ORDER — INSULIN LISPRO 100/ML
5 VIAL (ML) SUBCUTANEOUS
Refills: 0 | Status: DISCONTINUED | OUTPATIENT
Start: 2024-03-17 | End: 2024-03-19

## 2024-03-17 RX ORDER — CLOPIDOGREL BISULFATE 75 MG/1
75 TABLET, FILM COATED ORAL DAILY
Refills: 0 | Status: DISCONTINUED | OUTPATIENT
Start: 2024-03-17 | End: 2024-03-19

## 2024-03-17 RX ORDER — TAMSULOSIN HYDROCHLORIDE 0.4 MG/1
0.4 CAPSULE ORAL AT BEDTIME
Refills: 0 | Status: DISCONTINUED | OUTPATIENT
Start: 2024-03-17 | End: 2024-03-19

## 2024-03-17 RX ORDER — SPIRONOLACTONE 25 MG/1
25 TABLET, FILM COATED ORAL DAILY
Refills: 0 | Status: DISCONTINUED | OUTPATIENT
Start: 2024-03-17 | End: 2024-03-19

## 2024-03-17 RX ORDER — FUROSEMIDE 40 MG
40 TABLET ORAL EVERY 12 HOURS
Refills: 0 | Status: DISCONTINUED | OUTPATIENT
Start: 2024-03-17 | End: 2024-03-19

## 2024-03-17 RX ORDER — INSULIN LISPRO 100/ML
VIAL (ML) SUBCUTANEOUS
Refills: 0 | Status: DISCONTINUED | OUTPATIENT
Start: 2024-03-17 | End: 2024-03-19

## 2024-03-17 RX ORDER — TAMSULOSIN HYDROCHLORIDE 0.4 MG/1
1 CAPSULE ORAL
Refills: 0 | DISCHARGE

## 2024-03-17 RX ORDER — DEXTROSE 50 % IN WATER 50 %
12.5 SYRINGE (ML) INTRAVENOUS ONCE
Refills: 0 | Status: DISCONTINUED | OUTPATIENT
Start: 2024-03-17 | End: 2024-03-19

## 2024-03-17 RX ORDER — INSULIN ASPART 100 [IU]/ML
35 INJECTION, SUSPENSION SUBCUTANEOUS
Refills: 0 | DISCHARGE

## 2024-03-17 RX ORDER — HYDROCHLOROTHIAZIDE 25 MG
1 TABLET ORAL
Refills: 0 | DISCHARGE

## 2024-03-17 RX ORDER — FUROSEMIDE 40 MG
40 TABLET ORAL ONCE
Refills: 0 | Status: COMPLETED | OUTPATIENT
Start: 2024-03-17 | End: 2024-03-17

## 2024-03-17 RX ORDER — METOPROLOL TARTRATE 50 MG
100 TABLET ORAL DAILY
Refills: 0 | Status: DISCONTINUED | OUTPATIENT
Start: 2024-03-17 | End: 2024-03-19

## 2024-03-17 RX ORDER — APIXABAN 2.5 MG/1
5 TABLET, FILM COATED ORAL EVERY 12 HOURS
Refills: 0 | Status: DISCONTINUED | OUTPATIENT
Start: 2024-03-17 | End: 2024-03-19

## 2024-03-17 RX ORDER — ATORVASTATIN CALCIUM 80 MG/1
80 TABLET, FILM COATED ORAL AT BEDTIME
Refills: 0 | Status: DISCONTINUED | OUTPATIENT
Start: 2024-03-17 | End: 2024-03-19

## 2024-03-17 RX ORDER — DEXTROSE 50 % IN WATER 50 %
15 SYRINGE (ML) INTRAVENOUS ONCE
Refills: 0 | Status: DISCONTINUED | OUTPATIENT
Start: 2024-03-17 | End: 2024-03-19

## 2024-03-17 RX ORDER — PIOGLITAZONE HYDROCHLORIDE 15 MG/1
1 TABLET ORAL
Refills: 0 | DISCHARGE

## 2024-03-17 RX ORDER — FAMOTIDINE 10 MG/ML
1 INJECTION INTRAVENOUS
Refills: 0 | DISCHARGE

## 2024-03-17 RX ORDER — METFORMIN HYDROCHLORIDE 850 MG/1
1 TABLET ORAL
Refills: 0 | DISCHARGE

## 2024-03-17 RX ORDER — ATORVASTATIN CALCIUM 80 MG/1
1 TABLET, FILM COATED ORAL
Refills: 0 | DISCHARGE

## 2024-03-17 RX ORDER — INSULIN GLARGINE 100 [IU]/ML
10 INJECTION, SOLUTION SUBCUTANEOUS AT BEDTIME
Refills: 0 | Status: DISCONTINUED | OUTPATIENT
Start: 2024-03-17 | End: 2024-03-19

## 2024-03-17 RX ADMIN — Medication 40 MILLIGRAM(S): at 13:16

## 2024-03-17 RX ADMIN — Medication 100 MILLIGRAM(S): at 21:48

## 2024-03-17 RX ADMIN — Medication 40 MILLIGRAM(S): at 21:28

## 2024-03-17 RX ADMIN — TAMSULOSIN HYDROCHLORIDE 0.4 MILLIGRAM(S): 0.4 CAPSULE ORAL at 21:29

## 2024-03-17 RX ADMIN — INSULIN GLARGINE 10 UNIT(S): 100 INJECTION, SOLUTION SUBCUTANEOUS at 21:29

## 2024-03-17 RX ADMIN — ATORVASTATIN CALCIUM 80 MILLIGRAM(S): 80 TABLET, FILM COATED ORAL at 21:29

## 2024-03-17 NOTE — PATIENT PROFILE ADULT - FUNCTIONAL ASSESSMENT - DAILY ACTIVITY 3.
Progress Notes by Alysia Ching at 06/23/17 10:16 AM     Author:  Alysia Ching Service:  (none) Author Type:  Patient      Filed:  09/07/17 07:36 AM Encounter Date:  6/23/2017 Status:  Signed     :  Alysia Ching (Patient )            John Jones [id number] has been scheduled for advanced imaging exam on[ST1.1T] 9- for ct abd[ST1.1M]. When Precert completed, please reply to P [id number] (Rad Sched Pool).[ST1.1T]        Revision History        User Key Date/Time User Provider Type Action    > ST1.1 09/07/17 07:36 AM Alysia Ching Patient  Sign    M - Manual, T - Template             4 = No assist / stand by assistance

## 2024-03-17 NOTE — PATIENT PROFILE ADULT - BILL PAYMENT
no loss of consciousness, no gait abnormality, no headache, no sensory deficits, and no weakness.
Negative
no

## 2024-03-17 NOTE — PATIENT PROFILE ADULT - FALL HARM RISK - HARM RISK INTERVENTIONS

## 2024-03-17 NOTE — ED PROVIDER NOTE - CLINICAL SUMMARY MEDICAL DECISION MAKING FREE TEXT BOX
57 yo M patient with a PMH of HTN, diabetes, a-fib on Eliquis, BPH and CAD s/p PCI, HFpEF on lasix presented to the ED for SOB, with fluid overload. likely insetting of poor diet. much improved on BIPAP, lasix given, appropriate output. cxr images independently interpreted by me Dr. Rebolledo showing vascular congestion. ekg independently interpreted by angelica Rebolledo showing a-fib, no stemi. Patient significantly improved on BIPAP. admitted for further management.

## 2024-03-17 NOTE — ED PROVIDER NOTE - OBJECTIVE STATEMENT
Patient is a 58-year-old male history of hypertension high cholesterol diabetes CAD with stent and CHF on 40 of Lasix daily followed by Dr. Saunders presenting to ED for evaluation of acute onset shortness of breath that is gotten progressively worse since yesterday.  He walks worse when he lies flat. States he has been fasting for Ramadan and eating more salty foods when not fasting. Otherwise denies any fever, chills, headache, changes in vision, cough, congestion, cp, palpitations, sob, n/v/d, abd pain, constipation, urinary complaints, lower extremity pain/swelling.

## 2024-03-17 NOTE — H&P ADULT - NSICDXPASTMEDICALHX_GEN_ALL_CORE_FT
PAST MEDICAL HISTORY:  CAD S/P percutaneous coronary angioplasty     DM (diabetes mellitus)     HTN (hypertension)     Hyperlipidemia

## 2024-03-17 NOTE — ED PROVIDER NOTE - PROGRESS NOTE DETAILS
pk: pt dramatically improved on bipap, resting comfortably in no acute distress, vitals WNL, admitted to medicine for further evaluation and management of chf exacerbation.

## 2024-03-17 NOTE — ED PROVIDER NOTE - PHYSICAL EXAMINATION
CONSTITUTIONAL: well-appearing, in NAD  SKIN: Warm dry, normal skin turgor  HEAD: NCAT  EYES: EOMI, PERRLA, no scleral icterus, conjunctiva pink  ENT: normal pharynx with no erythema or exudates  NECK: Supple; non tender. Full ROM.  CARD: RRR, no murmurs.  RESP: crackles at the bases bilaterally  ABD: soft, non-tender, non-distended, no rebound or guarding.  EXT: Full ROM, no bony tenderness, no pedal edema, no calf tenderness  NEURO: normal motor. normal sensory.   PSYCH: Cooperative, appropriate.

## 2024-03-17 NOTE — ED ADULT NURSE REASSESSMENT NOTE - NS ED NURSE REASSESS COMMENT FT1
Pt is a&o x3 in no acute distress. VSS. Pt currently on 2 L NC saturating above 95%. Pt awaiting transport to room on 3B

## 2024-03-17 NOTE — ED ADULT TRIAGE NOTE - CHIEF COMPLAINT QUOTE
pt c.o. shortness of breath worsening on exertion associated with LE edema  pt hypoxic with increased work of breathing in triage

## 2024-03-17 NOTE — H&P ADULT - NSHPPHYSICALEXAM_GEN_ALL_CORE
LOS:     VITALS:   T(C): 35.9 (03-17-24 @ 13:20), Max: 36.1 (03-17-24 @ 12:49)  HR: 107 (03-17-24 @ 14:02) (94 - 115)  BP: 169/89 (03-17-24 @ 12:49) (169/89 - 169/89)  RR: 24 (03-17-24 @ 13:20) (24 - 26)  SpO2: 99% (03-17-24 @ 14:02) (88% - 99%)    GENERAL: NAD, lying in bed comfortably  HEAD:  Atraumatic, Normocephalic  EYES: EOMI, PERRLA, conjunctiva and sclera clear  ENT: Moist mucous membranes  NECK: Supple, No JVD  CHEST/LUNG: Clear to auscultation bilaterally; No rales, rhonchi, wheezing, or rubs. Unlabored respirations  HEART: Regular rate and rhythm; No murmurs, rubs, or gallops  ABDOMEN: BSx4; Soft, nontender, nondistended  EXTREMITIES:  2+ Peripheral Pulses, brisk capillary refill. No clubbing, cyanosis, or edema  NERVOUS SYSTEM:  A&Ox3, 5/5 motor bilateral upper and lower extremity, no sensory deficit   SKIN: No rashes or lesions LOS:     VITALS:   T(C): 35.9 (03-17-24 @ 13:20), Max: 36.1 (03-17-24 @ 12:49)  HR: 107 (03-17-24 @ 14:02) (94 - 115)  BP: 169/89 (03-17-24 @ 12:49) (169/89 - 169/89)  RR: 24 (03-17-24 @ 13:20) (24 - 26)  SpO2: 99% (03-17-24 @ 14:02) (88% - 99%)    GENERAL: NAD, lying in bed comfortably  HEAD:  Atraumatic, Normocephalic  EYES: EOMI, PERRLA, conjunctiva and sclera clear  ENT: Moist mucous membranes  NECK: Supple, No JVD  CHEST/LUNG: diffuse bilateral crackles   HEART: Regular rate and rhythm; No murmurs, rubs, or gallops  ABDOMEN: BSx4; Soft, nontender, nondistended  EXTREMITIES:  2+ Peripheral Pulses,+2 bilateral LE edema   NERVOUS SYSTEM:  A&Ox3, 5/5 motor bilateral upper and lower extremity, no sensory deficit   SKIN: No rashes or lesions

## 2024-03-17 NOTE — H&P ADULT - ASSESSMENT
57 yo M patient with a PMH of HTN, diabetes, BPH and CAD s/p PCI ( cardiologist Dr Genaro Thomas, s/p stress test and cardiac cath in March 2023 ), afib on eliquis, HFpEF on lasix presented to the ED for SOB. Patient has had exertional shortness of breath and increased lower extremity edema for the last week, has also been fasting and has had a poor diet control during Ramadan.  States that he had mild chest pain yesterday however at this time has no chest pain. No orthopnea. No fevers, chills, cough, vomiting, diarrhea, dysuria.    #Acute hypoxic respiratory failure 2/2 to HfpEF exacerbation   - Patient tachypneic, having difficulty completing sentences on admission   - Hypoxic on room air requiring nonrebreather initially, placed on BiPAP with significant improvement in work of breathing.  - Vitals: T 97, , /89, spo2 88% on 4L NC--> 97% on Bipap   -  trop 23, pro-BNP 1743 ( was 964 in Dec )  - CXR: Increasing, diffuse bilateral interstitial pulmonary opacities.  - EKG: afib   - last TTE Dec 2023: EF 70%   - s/p lasix 40 mg IV once in ED     #CAD s/p PCI     #Afib     #DM     #HTN     #BPH     #DVT ppx:   #GI ppx:   #Diet:   #Activity:   #Dispo:  59 yo M patient with a PMH of HTN, diabetes, BPH and CAD s/p PCI ( cardiologist Dr Genaro Thomas, s/p stress test and cardiac cath in March 2023 ), afib on eliquis, HFpEF on lasix presented to the ED for SOB. Patient has had exertional shortness of breath and increased lower extremity edema for the last week, has also been fasting and has had a poor diet control during Ramadan.  States that he had mild chest pain yesterday however at this time has no chest pain. No orthopnea. No fevers, chills, cough, vomiting, diarrhea, dysuria.    **Daughter will bring list on medications. Pharmacy closed today. Please confirm. Will resume meds that pt remembers.      #Acute hypoxic respiratory failure 2/2 to acute on chronic HfpEF exacerbation   - Patient tachypneic, having difficulty completing sentences on admission   - Hypoxic on room air requiring nonrebreather initially, placed on BiPAP with significant improvement in work of breathing.  - Vitals: T 97, , /89, spo2 88% on 4L NC--> 97% on Bipap   -  trop 23, pro-BNP 1743 ( was 964 in Dec )  - CXR: Increasing, diffuse bilateral interstitial pulmonary opacities.  - EKG: afib   - last TTE Dec 2023: EF 70%   - s/p lasix 40 mg IV once in ED   - start lasix 40 mg IV BID, switch to PO once euvolemic   - strict I&Os  - repeat CXR in 24-48 hrs   - wean off bipap as tolerated   - trend trop     #CAD s/p PCI   #HTN  #HLD   - c/w plavix , BB, statin   - c/w enalapril and spironolactone     #Afib   - c/w eliquis and metoprolol     #DM   - on home novolog and oral anti-diabetics   - pt fasting today   - monitor FS, target 140-180   - ISS for now, adjust as needed     #BPH   - c/w tamsulosin     #DVT ppx: eliquis   #GI ppx: famotidine   #Diet: DASH  #Activity: as tolerated   #Dispo: floor  59 yo M patient with a PMH of HTN, diabetes, BPH and CAD s/p PCI ( cardiologist Dr Genaro Thomas, s/p stress test and cardiac cath in March 2023 ), afib on eliquis, HFpEF on lasix presented to the ED for SOB. Patient has had exertional shortness of breath and increased lower extremity edema for the last week, has also been fasting and has had a poor diet control during Ramadan.  States that he had mild chest pain yesterday however at this time has no chest pain. No orthopnea. No fevers, chills, cough, vomiting, diarrhea, dysuria.    **Daughter will bring list on medications. Pharmacy closed today. Please confirm. Will resume meds that pt remembers.      #Acute hypoxic respiratory failure 2/2 to acute on chronic HfpEF exacerbation   - Patient tachypneic, having difficulty completing sentences on admission   - Hypoxic on room air requiring nonrebreather initially, placed on BiPAP with significant improvement in work of breathing.  - Vitals: T 97, , /89, spo2 88% on 4L NC--> 97% on Bipap   -  trop 23, pro-BNP 1743 ( was 964 in Dec )  - CXR: Increasing, diffuse bilateral interstitial pulmonary opacities.  - EKG: afib   - last TTE Dec 2023: EF 70%   - s/p lasix 40 mg IV once in ED   - start lasix 40 mg IV BID, switch to PO once euvolemic   - strict I&Os  - repeat CXR in 24-48 hrs   - wean off bipap as tolerated   - trend trop     #CAD s/p PCI   #HTN  #HLD   - c/w plavix , lopressor 100 mg OD , statin   - c/w enalapril 40 mg OD,  spironolactone 25 mg OD, HCTZ 25 mg OD     #Afib   - c/w eliquis and metoprolol     #DM   - on home novolog 35 units ( OD and sometimes BID ), ozempic and oral anti-diabetics   - start lantus 10U and lisop 5 TID   - monitor FS, target 140-180   - ISS     #BPH   - c/w tamsulosin     #DVT ppx: eliquis   #GI ppx: famotidine   #Diet: DASH  #Activity: as tolerated   #Dispo: floor

## 2024-03-17 NOTE — H&P ADULT - HISTORY OF PRESENT ILLNESS
57 yo M patient with a PMH of HTN, diabetes, BPH and CAD s/p PCI ( cardiologist Dr Genaro Thomas, s/p stress test and cardiac cath in March 2023 ), afib on eliquis, HFpEF on lasix presented to the ED for SOB. Patient has had exertional shortness of breath and increased lower extremity edema for the last week, has also been fasting and has had a poor diet control during Ramadan.  States that he had mild chest pain yesterday however at this time has no chest pain.  No fevers, mild cough with no significant phlegm.  No vomiting or diarrhea.  No dysuria.    Patient tachypneic, having difficulty completing sentences.  Hypoxic on room air requiring nonrebreather initially, placed on BiPAP with significant improvement in work of breathing. POCUS with B-lines diffusely as well as small right pleural effusion.  On exam patient with evidence of fluid overload, crackles noted.  Pitting edema to lower extremities bilaterally.     Vitals: T 97, , /89, spo2 88% on 4L NC--> 97% on Bipap   Labs: WBC 5.5K, Hb 12.1, trop 23, pro-BNP 1743 ( was 964 in Dec )  CXR: Increasing, diffuse bilateral interstitial pulmonary opacities.  EKG: afib   s/p lasix 40 mg IV once in ED  59 yo M patient with a PMH of HTN, diabetes, BPH and CAD s/p PCI ( cardiologist Dr Genaro Thomas, s/p stress test and cardiac cath in March 2023 ), afib on eliquis, HFpEF on lasix presented to the ED for SOB. Patient has had exertional shortness of breath and increased lower extremity edema for the last week, has also been fasting and has had a poor diet control during Ramadan.  States that he had mild chest pain yesterday however at this time has no chest pain. No orthopnea. No fevers, chills, cough, vomiting, diarrhea, dysuria.    Patient tachypneic, having difficulty completing sentences.  Hypoxic on room air requiring nonrebreather initially, placed on BiPAP with significant improvement in work of breathing. POCUS with B-lines diffusely as well as small right pleural effusion.  On exam patient with evidence of fluid overload, crackles noted.  Pitting edema to lower extremities bilaterally.     Vitals: T 97, , /89, spo2 88% on 4L NC--> 97% on Bipap   Labs: WBC 5.5K, Hb 12.1, trop 23, pro-BNP 1743 ( was 964 in Dec )  CXR: Increasing, diffuse bilateral interstitial pulmonary opacities.  EKG: afib   s/p lasix 40 mg IV once in ED  59 yo M patient with a PMH of HTN, diabetes, BPH and CAD s/p PCI ( cardiologist Dr Genaro Thomas, s/p stress test and cardiac cath in March 2023 ), afib on eliquis, HFpEF on lasix presented to the ED for SOB. Patient has had exertional shortness of breath and increased lower extremity edema for the last week, has also been fasting and has had a poor diet control during Ramadan.  States that he had mild chest pain yesterday however at this time has no chest pain. No orthopnea. No fevers, chills, cough, vomiting, diarrhea, dysuria.    Patient tachypneic, having difficulty completing sentences.  Hypoxic on room air requiring nonrebreather initially, placed on BiPAP with significant improvement in work of breathing. POCUS with B-lines diffusely as well as small right pleural effusion.  On exam patient with evidence of fluid overload, crackles noted.  Pitting edema to lower extremities bilaterally.     Vitals: T 97, , /89, spo2 88% on 4L NC--> 97% on Bipap   Labs: WBC 5.5K, Hb 12.1, trop 23, pro-BNP 1743 ( was 964 in Dec )  VBG: lactate 1.6, pH 7.38, pCO2 50  CXR: Increasing, diffuse bilateral interstitial pulmonary opacities.  EKG: afib   s/p lasix 40 mg IV once in ED  59 yo M patient with a PMH of HTN, diabetes, BPH and CAD s/p PCI ( cardiologist Dr Genaro Thomas, s/p stress test and cardiac cath in March 2023 ), afib on eliquis, HFpEF on lasix presented to the ED for SOB. Patient has had exertional shortness of breath and increased lower extremity edema for the last week, has also been fasting and has had a poor diet control during Ramadan.  States that he had mild chest pain yesterday however at this time has no chest pain. No orthopnea. No fevers, chills, cough, vomiting, diarrhea, dysuria. Pt also reported that he stopped taking HCTZ recently because he thought it's a lot of diuretics and stopped the spironolactone as pharmacy forgot to send it.     Patient tachypneic, having difficulty completing sentences.  Hypoxic on room air requiring nonrebreather initially, placed on BiPAP with significant improvement in work of breathing. POCUS with B-lines diffusely as well as small right pleural effusion.  On exam patient with evidence of fluid overload, crackles noted.  Pitting edema to lower extremities bilaterally.     Vitals: T 97, , /89, spo2 88% on 4L NC--> 97% on Bipap   Labs: WBC 5.5K, Hb 12.1, trop 23, pro-BNP 1743 ( was 964 in Dec )  VBG: lactate 1.6, pH 7.38, pCO2 50  CXR: Increasing, diffuse bilateral interstitial pulmonary opacities.  EKG: afib   s/p lasix 40 mg IV once in ED

## 2024-03-17 NOTE — ED PROVIDER NOTE - ATTENDING CONTRIBUTION TO CARE
59 yo M patient with a PMH of HTN, diabetes, BPH and CAD s/p PCI, HFpEF on lasix presented to the ED for SOB. Patient has had exertional shortness of breath and increased lower extremity edema for the last week, has also been fasting and has had a poor diet control during Ramadan.  States that he had mild chest pain yesterday however at this time has no chest pain.  No fevers, mild cough with no significant phlegm.  No vomiting or diarrhea.  No dysuria.    Patient tachypneic, having difficulty completing sentences.  Hypoxic on room air requiring nonrebreather initially, placed on BiPAP with significant improvement in work of breathing. POCUS with B-lines diffusely as well as small right pleural effusion.  On exam patient with evidence of fluid overload, crackles noted.  Pitting edema to lower extremities bilaterally.  Nontender abdomen.

## 2024-03-17 NOTE — H&P ADULT - ATTENDING COMMENTS
59 yo M patient with a PMH of HTN, diabetes, BPH and CAD s/p PCI ( cardiologist Dr Genaro Thomas, s/p stress test and cardiac cath in March 2023 ), afib on eliquis, HFpEF on lasix presented to the ED for SOB. Patient has had exertional shortness of breath and increased lower extremity edema for the last week, has also been fasting and has had a poor diet control during Ramadan.  States that he had mild chest pain yesterday however at this time has no chest pain.    Agree  with assessment  except for changes below.   Vital Signs (24 Hrs):  T(C): 35.9 (03-17-24 @ 13:20), Max: 36.1 (03-17-24 @ 12:49)  HR: 99 (03-17-24 @ 19:35) (94 - 115)  BP: 158/83 (03-17-24 @ 19:35) (158/83 - 169/89)  RR: 18 (03-17-24 @ 19:35) (18 - 26)  SpO2: 100% (03-17-24 @ 19:35) (88% - 100%)    CXR: Increasing, diffuse bilateral interstitial pulmonary opacities.    IMPRESSION  Acute  Hypoxic Respiratory Failure Secondary to HF Exacerbation  Pro-BNP 1743  Hx CAD s/p PCI  Hx HTN   Hx HLD   ECG Afib  c/w furosemide 40mg IV BID  c/w Plavix , lopressor 100 mg OD , Statin   c/w enalapril 40 mg OD,  spironolactone 25 mg OD, HCTZ 25 mg OD   Titrate supplemental O2 as tolerated   Admit to telemetry  Daily standing weights; strict I's & O's; 1.5L fluid restrict  NIV  PRN   Monitor 'lytes and replete accordingly (K>4; Mg>2)   Repeat EKG in AM  check lipid panel, A1c, and TSH  Obtain TTE  Cardiology consulted    Hx Afib   - c/w Eliquis and metoprolol     Hx DM   Hold oral meds;  check fs  Start insulin  regimen if  serum Glucose IF FS >180,   Adjust insulin  Lantus/Lispro,  for  Goal 140-180  Hold for Hypoglycemia     Hx BPH   - c/w tamsulosin 57 yo M patient with a PMH of HTN, diabetes, BPH and CAD s/p PCI ( cardiologist Dr Genaro Thomas, s/p stress test and cardiac cath in March 2023 ), afib on eliquis, HFpEF on lasix presented to the ED for SOB. Patient has had exertional shortness of breath and increased lower extremity edema for the last week, has also been fasting and has had a poor diet control during Ramadan.  States that he had mild chest pain yesterday however at this time has no chest pain.    Agree  with assessment  except for changes below.   Vital Signs (24 Hrs):  T(C): 35.9 (03-17-24 @ 13:20), Max: 36.1 (03-17-24 @ 12:49)  HR: 99 (03-17-24 @ 19:35) (94 - 115)  BP: 158/83 (03-17-24 @ 19:35) (158/83 - 169/89)  RR: 18 (03-17-24 @ 19:35) (18 - 26)  SpO2: 100% (03-17-24 @ 19:35) (88% - 100%)    CXR: Increasing, diffuse bilateral interstitial pulmonary opacities.    IMPRESSION  Acute  Hypoxic Respiratory Failure Secondary to HF Exacerbation  Pro-BNP 1743  Hx CAD s/p PCI  Hx HTN   Hx HLD   ECG Afib  c/w furosemide 40mg IV BID  c/w Plavix , lopressor 100 mg OD , Statin   c/w enalapril 40 mg OD,  spironolactone 25 mg OD, HCTZ 25 mg OD   Titrate supplemental O2 as tolerated   Admit to telemetry  Daily standing weights; strict I's & O's; 1.5L fluid restrict  NIV  PRN   Monitor 'lytes and replete accordingly (K>4; Mg>2)   Repeat EKG in AM  check lipid panel, A1c, and TSH  Obtain TTE  Cardiology consulted    Hx Afib   - c/w Eliquis and metoprolol     Hx DM   Hold oral meds;  check fs  Start insulin  regimen if  serum Glucose IF FS >180,   Adjust insulin  Lantus/Lispro,  for  Goal 140-180  Hold for Hypoglycemia     Hx BPH   - c/w tamsulosin    Seen on 03/17/24

## 2024-03-18 LAB
A1C WITH ESTIMATED AVERAGE GLUCOSE RESULT: 8.2 % — HIGH (ref 4–5.6)
ALBUMIN SERPL ELPH-MCNC: 3.7 G/DL — SIGNIFICANT CHANGE UP (ref 3.5–5.2)
ALP SERPL-CCNC: 82 U/L — SIGNIFICANT CHANGE UP (ref 30–115)
ALT FLD-CCNC: 23 U/L — SIGNIFICANT CHANGE UP (ref 0–41)
ANION GAP SERPL CALC-SCNC: 12 MMOL/L — SIGNIFICANT CHANGE UP (ref 7–14)
AST SERPL-CCNC: 19 U/L — SIGNIFICANT CHANGE UP (ref 0–41)
BASOPHILS # BLD AUTO: 0.02 K/UL — SIGNIFICANT CHANGE UP (ref 0–0.2)
BASOPHILS NFR BLD AUTO: 0.4 % — SIGNIFICANT CHANGE UP (ref 0–1)
BILIRUB SERPL-MCNC: 0.5 MG/DL — SIGNIFICANT CHANGE UP (ref 0.2–1.2)
BUN SERPL-MCNC: 21 MG/DL — HIGH (ref 10–20)
CALCIUM SERPL-MCNC: 8.5 MG/DL — SIGNIFICANT CHANGE UP (ref 8.4–10.5)
CHLORIDE SERPL-SCNC: 99 MMOL/L — SIGNIFICANT CHANGE UP (ref 98–110)
CO2 SERPL-SCNC: 27 MMOL/L — SIGNIFICANT CHANGE UP (ref 17–32)
CREAT SERPL-MCNC: 1.2 MG/DL — SIGNIFICANT CHANGE UP (ref 0.7–1.5)
EGFR: 69 ML/MIN/1.73M2 — SIGNIFICANT CHANGE UP
EOSINOPHIL # BLD AUTO: 0.16 K/UL — SIGNIFICANT CHANGE UP (ref 0–0.7)
EOSINOPHIL NFR BLD AUTO: 3.2 % — SIGNIFICANT CHANGE UP (ref 0–8)
ESTIMATED AVERAGE GLUCOSE: 189 MG/DL — HIGH (ref 68–114)
GLUCOSE BLDC GLUCOMTR-MCNC: 120 MG/DL — HIGH (ref 70–99)
GLUCOSE BLDC GLUCOMTR-MCNC: 144 MG/DL — HIGH (ref 70–99)
GLUCOSE BLDC GLUCOMTR-MCNC: 224 MG/DL — HIGH (ref 70–99)
GLUCOSE BLDC GLUCOMTR-MCNC: 277 MG/DL — HIGH (ref 70–99)
GLUCOSE SERPL-MCNC: 215 MG/DL — HIGH (ref 70–99)
HCT VFR BLD CALC: 34.8 % — LOW (ref 42–52)
HGB BLD-MCNC: 10.8 G/DL — LOW (ref 14–18)
IMM GRANULOCYTES NFR BLD AUTO: 0.2 % — SIGNIFICANT CHANGE UP (ref 0.1–0.3)
LYMPHOCYTES # BLD AUTO: 1 K/UL — LOW (ref 1.2–3.4)
LYMPHOCYTES # BLD AUTO: 20.1 % — LOW (ref 20.5–51.1)
MCHC RBC-ENTMCNC: 24.7 PG — LOW (ref 27–31)
MCHC RBC-ENTMCNC: 31 G/DL — LOW (ref 32–37)
MCV RBC AUTO: 79.5 FL — LOW (ref 80–94)
MONOCYTES # BLD AUTO: 0.38 K/UL — SIGNIFICANT CHANGE UP (ref 0.1–0.6)
MONOCYTES NFR BLD AUTO: 7.6 % — SIGNIFICANT CHANGE UP (ref 1.7–9.3)
NEUTROPHILS # BLD AUTO: 3.41 K/UL — SIGNIFICANT CHANGE UP (ref 1.4–6.5)
NEUTROPHILS NFR BLD AUTO: 68.5 % — SIGNIFICANT CHANGE UP (ref 42.2–75.2)
NRBC # BLD: 0 /100 WBCS — SIGNIFICANT CHANGE UP (ref 0–0)
PLATELET # BLD AUTO: 278 K/UL — SIGNIFICANT CHANGE UP (ref 130–400)
PMV BLD: 10.5 FL — HIGH (ref 7.4–10.4)
POTASSIUM SERPL-MCNC: 4.1 MMOL/L — SIGNIFICANT CHANGE UP (ref 3.5–5)
POTASSIUM SERPL-SCNC: 4.1 MMOL/L — SIGNIFICANT CHANGE UP (ref 3.5–5)
PROT SERPL-MCNC: 6.6 G/DL — SIGNIFICANT CHANGE UP (ref 6–8)
RBC # BLD: 4.38 M/UL — LOW (ref 4.7–6.1)
RBC # FLD: 16.4 % — HIGH (ref 11.5–14.5)
SODIUM SERPL-SCNC: 138 MMOL/L — SIGNIFICANT CHANGE UP (ref 135–146)
TROPONIN T, HIGH SENSITIVITY RESULT: 27 NG/L — HIGH (ref 6–21)
TROPONIN T, HIGH SENSITIVITY RESULT: 28 NG/L — HIGH (ref 6–21)
WBC # BLD: 4.98 K/UL — SIGNIFICANT CHANGE UP (ref 4.8–10.8)
WBC # FLD AUTO: 4.98 K/UL — SIGNIFICANT CHANGE UP (ref 4.8–10.8)

## 2024-03-18 PROCEDURE — 71045 X-RAY EXAM CHEST 1 VIEW: CPT | Mod: 26

## 2024-03-18 PROCEDURE — 99232 SBSQ HOSP IP/OBS MODERATE 35: CPT

## 2024-03-18 RX ORDER — POLYETHYLENE GLYCOL 3350 17 G/17G
17 POWDER, FOR SOLUTION ORAL DAILY
Refills: 0 | Status: DISCONTINUED | OUTPATIENT
Start: 2024-03-18 | End: 2024-03-19

## 2024-03-18 RX ADMIN — Medication 100 MILLIGRAM(S): at 05:27

## 2024-03-18 RX ADMIN — Medication 40 MILLIGRAM(S): at 05:28

## 2024-03-18 RX ADMIN — CLOPIDOGREL BISULFATE 75 MILLIGRAM(S): 75 TABLET, FILM COATED ORAL at 11:40

## 2024-03-18 RX ADMIN — APIXABAN 5 MILLIGRAM(S): 2.5 TABLET, FILM COATED ORAL at 05:28

## 2024-03-18 RX ADMIN — Medication 3: at 17:54

## 2024-03-18 RX ADMIN — TAMSULOSIN HYDROCHLORIDE 0.4 MILLIGRAM(S): 0.4 CAPSULE ORAL at 21:25

## 2024-03-18 RX ADMIN — SPIRONOLACTONE 25 MILLIGRAM(S): 25 TABLET, FILM COATED ORAL at 05:28

## 2024-03-18 RX ADMIN — ATORVASTATIN CALCIUM 80 MILLIGRAM(S): 80 TABLET, FILM COATED ORAL at 21:25

## 2024-03-18 RX ADMIN — Medication 40 MILLIGRAM(S): at 18:06

## 2024-03-18 RX ADMIN — APIXABAN 5 MILLIGRAM(S): 2.5 TABLET, FILM COATED ORAL at 18:06

## 2024-03-18 RX ADMIN — FAMOTIDINE 40 MILLIGRAM(S): 10 INJECTION INTRAVENOUS at 18:06

## 2024-03-18 RX ADMIN — INSULIN GLARGINE 10 UNIT(S): 100 INJECTION, SOLUTION SUBCUTANEOUS at 22:49

## 2024-03-18 RX ADMIN — POLYETHYLENE GLYCOL 3350 17 GRAM(S): 17 POWDER, FOR SOLUTION ORAL at 19:31

## 2024-03-18 RX ADMIN — Medication 5 UNIT(S): at 08:38

## 2024-03-18 RX ADMIN — Medication 5 UNIT(S): at 17:55

## 2024-03-18 RX ADMIN — Medication 2: at 08:38

## 2024-03-18 RX ADMIN — FAMOTIDINE 40 MILLIGRAM(S): 10 INJECTION INTRAVENOUS at 05:28

## 2024-03-18 RX ADMIN — Medication 5 UNIT(S): at 11:33

## 2024-03-18 NOTE — PROGRESS NOTE ADULT - ASSESSMENT
Patient is a 59 yo M w/PMH of HTN, diabetes, BPH and CAD s/p PCI ( cardiologist Dr Genaro Thomas, s/p stress test and cardiac cath in March 2023 ), afib on eliquis, HFpEF on lasix (last TTE showing LVEF 70%) who presented to the ED w/dyspnea on exertion and increased lower extremity edema for a week w/o orthopnea w/mild chest pain for 1 day that has resolved. Of note, patient has stopped taking HCTZ on his own and stopped taking spironolactone because the pharmacy forgot to send it. In the ED, his vitals were notable for hypertension of 169/89, tachycardia of 115, and hypoxia of 88% on 4L NC and 97% on bipap, w/labs notable for pro-BNP 1743 (964 in Dec 2023), trop 23, and VBG showing pH 7.38, pCO2 50, bicarb 30, and lactate 1.6, w/EKG showing afib w/RVR, w/CXR showing Increasing, diffuse bilateral interstitial pulmonary opacities and w/POCUS showing B-lines and small right pleural effusion. He is now s/p 40 mg of lasix in the ED and admitted for management of acute hypoxic respiratory failure 2/2 HFpEF exacerbation.    #Acute hypoxic respiratory failure 2/2 to acute on chronic HfpEF exacerbation   - Patient tachypneic, having difficulty completing sentences on admission   - Hypoxic on room air requiring nonrebreather initially, placed on BiPAP with significant improvement in work of breathing.  - Vitals: T 97, , /89, spo2 88% on 4L NC--> 97% on Bipap   - trop 23, pro-BNP 1743 ( was 964 in Dec )  - CXR: Increasing, diffuse bilateral interstitial pulmonary opacities.  - EKG: afib w/RVR  - last TTE Dec 2023: EF 70%   - s/p lasix 40 mg IV once in ED  - c/w lasix 40 mg IV BID  - strict I&Os, daily standing weight, fluid restrict 1.5 L  - repeat CXR in 24-48 hrs   - Pending TTE  - wean off bipap as tolerated   - trend trop     #CAD s/p PCI   #HTN  #HLD   - c/w plavix , lopressor 100 mg OD , statin   - c/w enalapril 40 mg OD,  spironolactone 25 mg OD, HCTZ 25 mg OD     #Afib   - c/w eliquis and metoprolol     #DM   - on home novolog 35 units ( OD and sometimes BID ), ozempic and oral anti-diabetics   - start lantus 10U and lisop 5 TID   - monitor FS, target 140-180   - ISS     #BPH   - c/w tamsulosin     #DVT ppx: eliquis   #GI ppx: famotidine   #Diet: DASH  #Activity: as tolerated   #Dispo: floor    Patient is a 59 yo M w/PMH of HTN, diabetes, BPH and CAD s/p PCI ( cardiologist Dr Genaro Thomas, s/p stress test and cardiac cath in March 2023 ), afib on eliquis, HFpEF on lasix (last TTE showing LVEF 70%) who presented to the ED w/dyspnea on exertion and increased lower extremity edema for a week w/o orthopnea w/mild chest pain for 1 day that has resolved. Of note, patient has stopped taking HCTZ on his own and stopped taking spironolactone because the pharmacy forgot to send it. In the ED, his vitals were notable for hypertension of 169/89, tachycardia of 115, and hypoxia of 88% on 4L NC and 97% on bipap, w/labs notable for pro-BNP 1743 (964 in Dec 2023), trop 23, and VBG showing pH 7.38, pCO2 50, bicarb 30, and lactate 1.6, w/EKG showing afib w/RVR, w/CXR showing Increasing, diffuse bilateral interstitial pulmonary opacities and w/POCUS showing B-lines and small right pleural effusion. He is now s/p 40 mg of lasix in the ED and admitted for management of acute hypoxic respiratory failure 2/2 HFpEF exacerbation.    *Obtain med rec from daughter    #Acute hypoxic respiratory failure 2/2 to acute on chronic HfpEF exacerbation   - Patient tachypneic, having difficulty completing sentences on admission   - Hypoxic on room air requiring nonrebreather initially, placed on BiPAP with significant improvement in work of breathing.  - Vitals: T 97, , /89, spo2 88% on 4L NC--> 97% on Bipap   - trop 23, pro-BNP 1743 ( was 964 in Dec )  - CXR: Increasing, diffuse bilateral interstitial pulmonary opacities.  - EKG: afib w/RVR  - last TTE Dec 2023: EF 70%   - s/p lasix 40 mg IV once in ED  - c/w lasix 40 mg IV BID  - strict I&Os, daily standing weight, fluid restrict 1.5 L  - repeat CXR in 24-48 hrs   - Pending TTE  - wean off bipap as tolerated   - trend trop    #Afib w/RVR  - c/w eliquis and metoprolol  - Telemetry?    #CAD s/p PCI   #HTN  #HLD   - c/w plavix, lopressor 100 mg OD, statin   - c/w enalapril 40 mg OD, spironolactone 25 mg OD, HCTZ 25 mg OD     #DM   - on home novolog 35 units ( OD and sometimes BID ), ozempic and oral anti-diabetics   - start lantus 10U and lisop 5 TID   - monitor FS, target 140-180   - ISS     #BPH   - c/w tamsulosin     #MISC  -DVT ppx: eliquis   -GI ppx: famotidine   -Diet: DASH/CC  -Activity: as tolerated   -Dispo: floor    Patient is a 59 yo M w/PMH of HTN, diabetes, BPH and CAD s/p PCI ( cardiologist Dr Genaro Thomas, s/p stress test and cardiac cath in March 2023 ), afib on eliquis, HFpEF on lasix (last TTE showing LVEF 70%) who presented to the ED w/dyspnea on exertion and increased lower extremity edema for a week w/o orthopnea w/mild chest pain for 1 day that has resolved. Of note, patient has stopped taking HCTZ on his own and stopped taking spironolactone because the pharmacy forgot to send it. In the ED, his vitals were notable for hypertension of 169/89, tachycardia of 115, and hypoxia of 88% on 4L NC and 97% on bipap, w/labs notable for pro-BNP 1743 (964 in Dec 2023), trop 23, and VBG showing pH 7.38, pCO2 50, bicarb 30, and lactate 1.6, w/EKG showing afib w/RVR, w/CXR showing Increasing, diffuse bilateral interstitial pulmonary opacities and w/POCUS showing B-lines and small right pleural effusion. He is now s/p 40 mg of lasix in the ED and admitted for management of acute hypoxic respiratory failure 2/2 HFpEF exacerbation.    *Obtain med rec from daughter    #Acute hypoxic respiratory failure 2/2 to acute on chronic HfpEF exacerbation   - Patient tachypneic, having difficulty completing sentences on admission   - Hypoxic on room air requiring nonrebreather initially, placed on BiPAP with significant improvement in work of breathing.  - Vitals: T 97, , /89, spo2 88% on 4L NC--> 97% on Bipap   - trop 23, pro-BNP 1743 ( was 964 in Dec )  - CXR: Increasing, diffuse bilateral interstitial pulmonary opacities.  - EKG: afib w/RVR  - last TTE Dec 2023: EF 70%   - s/p lasix 40 mg IV once in ED  - c/w lasix 40 mg IV BID  - strict I&Os, daily standing weight, fluid restrict 1.5 L  - repeat CXR in 24-48 hrs   - Pending TTE  - wean off bipap as tolerated   - trend trop    #Afib w/RVR  - c/w eliquis and metoprolol    #CAD s/p PCI   #HTN  #HLD   - c/w plavix, lopressor 100 mg OD, statin   - c/w enalapril 40 mg OD, spironolactone 25 mg OD, HCTZ 25 mg OD     #DM   - on home novolog 35 units ( OD and sometimes BID ), ozempic and oral anti-diabetics   - c/w lantus 10U and lisop 5 TID   - monitor FS, target 140-180   - ISS     #BPH   - c/w tamsulosin     #MISC  -DVT ppx: eliquis   -GI ppx: famotidine   -Diet: DASH/CC  -Activity: as tolerated   -Dispo: floor

## 2024-03-18 NOTE — PROGRESS NOTE ADULT - ATTENDING COMMENTS
Patient seen at bedside. Changes made within note as well. Discussed with medical team that includes resident(s), medical student(s), nursing staff, case management.     Patient is a 59 yo M w/PMH of HTN, diabetes, BPH and CAD s/p PCI ( cardiologist Dr Genaro Thomas, s/p stress test and cardiac cath in March 2023 ), afib on eliquis, HFpEF on lasix (last TTE showing LVEF 70%) who presented to the ED w/dyspnea on exertion and increased lower extremity edema for a week w/o orthopnea w/mild chest pain for 1 day that has resolved. Of note, patient has stopped taking HCTZ on his own and stopped taking spironolactone because the pharmacy forgot to send it. In the ED, his vitals were notable for hypertension of 169/89, tachycardia of 115, and hypoxia of 88% on 4L NC and 97% on bipap, w/labs notable for pro-BNP 1743 (964 in Dec 2023), trop 23, and VBG showing pH 7.38, pCO2 50, bicarb 30, and lactate 1.6, w/EKG showing afib w/RVR, w/CXR showing Increasing, diffuse bilateral interstitial pulmonary opacities and w/POCUS showing B-lines and small right pleural effusion. He is now s/p 40 mg of lasix in the ED and admitted for management of acute hypoxic respiratory failure 2/2 HFpEF exacerbation.    *Obtain med rec from daughter. she is going to come today     #Acute hypoxic respiratory failure 2/2 to acute on chronic HfpEF exacerbation , improving   - Patient tachypneic, having difficulty completing sentences on admission   - Hypoxic on room air requiring nonrebreather initially, placed on BiPAP with significant improvement in work of breathing.  - trop 23, pro-BNP 1743 ( was 964 in Dec ) repeat trop pending   - CXR: Increasing, diffuse bilateral interstitial pulmonary opacities.  - EKG: afib w/RVR now rate controlled   - last TTE Dec 2023: EF 70%   - s/p lasix 40 mg IV once in ED  - c/w lasix 40 mg IV BID  - strict I&Os, daily standing weight, fluid restrict 1.5 L  - repeat CXR in 24-48 hrs   - Pending TTE  - wean off bipap as tolerated   - trend trop    #Afib w/RVR  - c/w eliquis and metoprolol  - rate controlled     #CAD s/p PCI   #HTN  #HLD   - c/w plavix, lopressor 100 mg OD, statin   - c/w enalapril 40 mg OD, spironolactone 25 mg OD, HCTZ 25 mg OD     #DM   - on home novolog 35 units ( OD and sometimes BID ), ozempic and oral anti-diabetics   - start lantus 10U and lisop 5 TID   - monitor FS, target 140-180   - ISS     #BPH   - c/w tamsulosin       follow up: wean off oxygen, iv lasix, need ambulatory oxygen before discharge.  follow troponin (no chest pain during my interview and wanted to leave today however was able to convince him to stay for one more day). if worsening symptoms may need to consult cardiology

## 2024-03-18 NOTE — PROGRESS NOTE ADULT - SUBJECTIVE AND OBJECTIVE BOX
----------Daily Progress Note----------    HISTORY OF PRESENT ILLNESS:  Patient is a 59 yo M w/PMH of HTN, diabetes, BPH and CAD s/p PCI ( cardiologist Dr Genaro Thomas, s/p stress test and cardiac cath in March 2023 ), afib on eliquis, HFpEF on lasix (last TTE showing LVEF 70%) who presented to the ED w/dyspnea on exertion and increased lower extremity edema for a week w/o orthopnea w/mild chest pain for 1 day that has resolved. Of note, patient has stopped taking HCTZ on his own and stopped taking spironolactone because the pharmacy forgot to send it. In the ED, his vitals were notable for hypertension of 169/89, tachycardia of 115, and hypoxia of 88% on 4L NC and 97% on bipap, w/labs notable for pro-BNP 1743 (964 in Dec 2023), trop 23, and VBG showing pH 7.38, pCO2 50, bicarb 30, and lactate 1.6, w/EKG showing afib w/RVR, w/CXR showing Increasing, diffuse bilateral interstitial pulmonary opacities and w/POCUS showing B-lines and small right pleural effusion. He is now s/p 40 mg of lasix in the ED and admitted for management of acute hypoxic respiratory failure 2/2 HFpEF exacerbation.     Today is hospital day 1d.     INTERVAL HOSPITAL COURSE / OVERNIGHT EVENTS:  O/N events:      24 hr events:       Patient was examined and seen at bedside. This morning he is resting comfortably in bed and reports no new issues or overnight events.     Review of Systems: Otherwise unremarkable     <<<<<PAST MEDICAL & SURGICAL HISTORY>>>>>  CAD S/P percutaneous coronary angioplasty    DM (diabetes mellitus)    Hyperlipidemia    HTN (hypertension)      ALLERGIES  No Known Allergies      Home Medications:  atorvastatin 80 mg oral tablet: 1 tab(s) orally once a day (17 Mar 2024 17:35)  enalapril 20 mg oral tablet: 2 tab(s) orally once a day (17 Mar 2024 17:43)  famotidine 40 mg oral tablet: 1 tab(s) orally 2 times a day (17 Mar 2024 17:35)  glipiZIDE 10 mg oral tablet: 1 tab(s) orally once a day (17 Mar 2024 17:35)  hydroCHLOROthiazide 25 mg oral tablet: 1 tab(s) orally once a day (17 Mar 2024 17:35)  metFORMIN 1000 mg oral tablet: 1 tab(s) orally 2 times a day (17 Mar 2024 17:35)  NovoLOG Mix 70/30 FlexPen subcutaneous suspension: 35 unit(s) subcutaneous once a day (17 Mar 2024 17:33)  pioglitazone 30 mg oral tablet: 1 tab(s) orally once a day (17 Mar 2024 17:35)  Plavix 75 mg oral tablet: 1 tab(s) orally once a day (17 Mar 2024 17:35)  semaglutide 0.25 mg/0.5 mL (0.25 mg dose) subcutaneous solution: 0.25 milligram(s) subcutaneously once a week (17 Mar 2024 17:35)  spironolactone 25 mg oral tablet: 1 tab(s) orally once a day (17 Mar 2024 17:35)  tamsulosin 0.4 mg oral capsule: 1 orally once a day (17 Mar 2024 17:35)        MEDICATIONS  STANDING MEDICATIONS  apixaban 5 milliGRAM(s) Oral every 12 hours  atorvastatin 80 milliGRAM(s) Oral at bedtime  clopidogrel Tablet 75 milliGRAM(s) Oral daily  dextrose 5%. 1000 milliLiter(s) IV Continuous <Continuous>  dextrose 5%. 1000 milliLiter(s) IV Continuous <Continuous>  dextrose 50% Injectable 25 Gram(s) IV Push once  dextrose 50% Injectable 12.5 Gram(s) IV Push once  dextrose 50% Injectable 25 Gram(s) IV Push once  enalapril 40 milliGRAM(s) Oral daily  famotidine    Tablet 40 milliGRAM(s) Oral two times a day  furosemide   Injectable 40 milliGRAM(s) IV Push every 12 hours  glucagon  Injectable 1 milliGRAM(s) IntraMuscular once  hydrochlorothiazide 25 milliGRAM(s) Oral daily  influenza   Vaccine 0.5 milliLiter(s) IntraMuscular once  insulin glargine Injectable (LANTUS) 10 Unit(s) SubCutaneous at bedtime  insulin lispro (ADMELOG) corrective regimen sliding scale   SubCutaneous three times a day before meals  insulin lispro Injectable (ADMELOG) 5 Unit(s) SubCutaneous three times a day before meals  metoprolol tartrate 100 milliGRAM(s) Oral daily  spironolactone 25 milliGRAM(s) Oral daily  tamsulosin 0.4 milliGRAM(s) Oral at bedtime    PRN MEDICATIONS  dextrose Oral Gel 15 Gram(s) Oral once PRN    VITALS:  T(F): 98.6  HR: 97  BP: 129/82  RR: 20  SpO2: 98%    <<<<<PHYSICAL EXAM>>>>>  GENERAL: Well developed, well nourished and in no acute distress. Resting comfortably in bed.  HEENT: Normocephalic, atraumatic, mucous membranes moist, EOMI, PERRLA, bilateral sclera anicteric, no conjunctival injection  Neck: Supple, non-tender, no lymphadenopathy.  PULMONARY: Clear to auscultation bilaterally. No rales, rhonchi, or wheezing.  CARDIOVASCULAR: Regular rate and rhythm, S1-S2, no murmurs  GASTROINTESTINAL: Soft, non-tender, non-distended, no guarding.  RENAL: No CVA tenderness.  SKIN/EXTREMITIES: No clubbing or edema  NEUROLOGIC/MUSCULOSKELETAL: AOx4, grossly moving all extremities, no focal deficits.    <<<<<LABS>>>>>                        12.1   5.55  )-----------( 264      ( 17 Mar 2024 13:43 )             39.4     03-17    136  |  101  |  16  ----------------------------<  142<H>  4.6   |  21  |  0.9    Ca    8.8      17 Mar 2024 13:43    TPro  7.2  /  Alb  3.9  /  TBili  0.5  /  DBili  x   /  AST  27  /  ALT  23  /  AlkPhos  87  03-17      Urinalysis Basic - ( 17 Mar 2024 13:43 )    Color: x / Appearance: x / SG: x / pH: x  Gluc: 142 mg/dL / Ketone: x  / Bili: x / Urobili: x   Blood: x / Protein: x / Nitrite: x   Leuk Esterase: x / RBC: x / WBC x   Sq Epi: x / Non Sq Epi: x / Bacteria: x          [id number]        <<<<<RADIOLOGY>>>>>  CXR (3/18): There are increasing diffuse bilateral interstitial pulmonary opacities.  No pleural effusion or pneumothorax is seen.    ----------------------------------------------------------------------------------------------------------------------------------------------------------------------------------------------- ----------Daily Progress Note----------    HISTORY OF PRESENT ILLNESS:  Patient is a 57 yo M w/PMH of HTN, diabetes, BPH and CAD s/p PCI ( cardiologist Dr Genaro Thomas, s/p stress test and cardiac cath in March 2023 ), afib on eliquis, HFpEF on lasix (last TTE showing LVEF 70%) who presented to the ED w/dyspnea on exertion and increased lower extremity edema for a week w/o orthopnea w/mild chest pain for 1 day that has resolved. Of note, patient has stopped taking HCTZ on his own and stopped taking spironolactone because the pharmacy forgot to send it. In the ED, his vitals were notable for hypertension of 169/89, tachycardia of 115, and hypoxia of 88% on 4L NC and 97% on bipap, w/labs notable for pro-BNP 1743 (964 in Dec 2023), trop 23, and VBG showing pH 7.38, pCO2 50, bicarb 30, and lactate 1.6, w/EKG showing afib w/RVR, w/CXR showing Increasing, diffuse bilateral interstitial pulmonary opacities and w/POCUS showing B-lines and small right pleural effusion. He is now s/p 40 mg of lasix in the ED and admitted for management of acute hypoxic respiratory failure 2/2 HFpEF exacerbation.     Today is hospital day 1d.     INTERVAL HOSPITAL COURSE / OVERNIGHT EVENTS:  O/N events:  None    24 hr events:   None    Patient was examined and seen at bedside. Patient continues to report dyspnea and LE swelling, endorses good appetite, endorses 1 BM yesterday, reports wanting to work with PT, denies any other issues    Review of Systems: Otherwise unremarkable     <<<<<PAST MEDICAL & SURGICAL HISTORY>>>>>  CAD S/P percutaneous coronary angioplasty    DM (diabetes mellitus)    Hyperlipidemia    HTN (hypertension)      ALLERGIES  No Known Allergies      Home Medications:  atorvastatin 80 mg oral tablet: 1 tab(s) orally once a day (17 Mar 2024 17:35)  enalapril 20 mg oral tablet: 2 tab(s) orally once a day (17 Mar 2024 17:43)  famotidine 40 mg oral tablet: 1 tab(s) orally 2 times a day (17 Mar 2024 17:35)  glipiZIDE 10 mg oral tablet: 1 tab(s) orally once a day (17 Mar 2024 17:35)  hydroCHLOROthiazide 25 mg oral tablet: 1 tab(s) orally once a day (17 Mar 2024 17:35)  metFORMIN 1000 mg oral tablet: 1 tab(s) orally 2 times a day (17 Mar 2024 17:35)  NovoLOG Mix 70/30 FlexPen subcutaneous suspension: 35 unit(s) subcutaneous once a day (17 Mar 2024 17:33)  pioglitazone 30 mg oral tablet: 1 tab(s) orally once a day (17 Mar 2024 17:35)  Plavix 75 mg oral tablet: 1 tab(s) orally once a day (17 Mar 2024 17:35)  semaglutide 0.25 mg/0.5 mL (0.25 mg dose) subcutaneous solution: 0.25 milligram(s) subcutaneously once a week (17 Mar 2024 17:35)  spironolactone 25 mg oral tablet: 1 tab(s) orally once a day (17 Mar 2024 17:35)  tamsulosin 0.4 mg oral capsule: 1 orally once a day (17 Mar 2024 17:35)        MEDICATIONS  STANDING MEDICATIONS  apixaban 5 milliGRAM(s) Oral every 12 hours  atorvastatin 80 milliGRAM(s) Oral at bedtime  clopidogrel Tablet 75 milliGRAM(s) Oral daily  dextrose 5%. 1000 milliLiter(s) IV Continuous <Continuous>  dextrose 5%. 1000 milliLiter(s) IV Continuous <Continuous>  dextrose 50% Injectable 25 Gram(s) IV Push once  dextrose 50% Injectable 12.5 Gram(s) IV Push once  dextrose 50% Injectable 25 Gram(s) IV Push once  enalapril 40 milliGRAM(s) Oral daily  famotidine    Tablet 40 milliGRAM(s) Oral two times a day  furosemide   Injectable 40 milliGRAM(s) IV Push every 12 hours  glucagon  Injectable 1 milliGRAM(s) IntraMuscular once  hydrochlorothiazide 25 milliGRAM(s) Oral daily  influenza   Vaccine 0.5 milliLiter(s) IntraMuscular once  insulin glargine Injectable (LANTUS) 10 Unit(s) SubCutaneous at bedtime  insulin lispro (ADMELOG) corrective regimen sliding scale   SubCutaneous three times a day before meals  insulin lispro Injectable (ADMELOG) 5 Unit(s) SubCutaneous three times a day before meals  metoprolol tartrate 100 milliGRAM(s) Oral daily  spironolactone 25 milliGRAM(s) Oral daily  tamsulosin 0.4 milliGRAM(s) Oral at bedtime    PRN MEDICATIONS  dextrose Oral Gel 15 Gram(s) Oral once PRN    VITALS:  T(F): 98.6  HR: 97  BP: 129/82  RR: 20  SpO2: 98%    <<<<<PHYSICAL EXAM>>>>>  GENERAL: NAD  PULMONARY: BL middle lobe crackles, decreased breath sounds at bases  CARDIOVASCULAR: Irregular rhythm, tachycardic, S1-S2, no murmurs, rubs, or gallops  GASTROINTESTINAL: Soft, non-tender, non-distended, no guarding  SKIN/EXTREMITIES: Warm, 2+ tibialis posterior pulses, BL LE 3+ pitting edema  NEUROLOGIC/MUSCULOSKELETAL: AOx4, grossly moving all extremities, no focal deficits    <<<<<LABS>>>>>                        12.1   5.55  )-----------( 264      ( 17 Mar 2024 13:43 )             39.4     03-17    136  |  101  |  16  ----------------------------<  142<H>  4.6   |  21  |  0.9    Ca    8.8      17 Mar 2024 13:43    TPro  7.2  /  Alb  3.9  /  TBili  0.5  /  DBili  x   /  AST  27  /  ALT  23  /  AlkPhos  87  03-17      Urinalysis Basic - ( 17 Mar 2024 13:43 )    Color: x / Appearance: x / SG: x / pH: x  Gluc: 142 mg/dL / Ketone: x  / Bili: x / Urobili: x   Blood: x / Protein: x / Nitrite: x   Leuk Esterase: x / RBC: x / WBC x   Sq Epi: x / Non Sq Epi: x / Bacteria: x          [id number]        <<<<<RADIOLOGY>>>>>  CXR (3/18): There are increasing diffuse bilateral interstitial pulmonary opacities.  No pleural effusion or pneumothorax is seen.    -----------------------------------------------------------------------------------------------------------------------------------------------------------------------------------------------

## 2024-03-19 ENCOUNTER — TRANSCRIPTION ENCOUNTER (OUTPATIENT)
Age: 59
End: 2024-03-19

## 2024-03-19 ENCOUNTER — RESULT REVIEW (OUTPATIENT)
Age: 59
End: 2024-03-19

## 2024-03-19 VITALS — HEART RATE: 92 BPM | OXYGEN SATURATION: 100 % | RESPIRATION RATE: 18 BRPM

## 2024-03-19 LAB
ANION GAP SERPL CALC-SCNC: 11 MMOL/L — SIGNIFICANT CHANGE UP (ref 7–14)
BUN SERPL-MCNC: 20 MG/DL — SIGNIFICANT CHANGE UP (ref 10–20)
CALCIUM SERPL-MCNC: 9.1 MG/DL — SIGNIFICANT CHANGE UP (ref 8.4–10.5)
CHLORIDE SERPL-SCNC: 93 MMOL/L — LOW (ref 98–110)
CO2 SERPL-SCNC: 28 MMOL/L — SIGNIFICANT CHANGE UP (ref 17–32)
CREAT SERPL-MCNC: 1.1 MG/DL — SIGNIFICANT CHANGE UP (ref 0.7–1.5)
EGFR: 77 ML/MIN/1.73M2 — SIGNIFICANT CHANGE UP
GLUCOSE BLDC GLUCOMTR-MCNC: 195 MG/DL — HIGH (ref 70–99)
GLUCOSE BLDC GLUCOMTR-MCNC: 262 MG/DL — HIGH (ref 70–99)
GLUCOSE SERPL-MCNC: 253 MG/DL — HIGH (ref 70–99)
HCT VFR BLD CALC: 36.2 % — LOW (ref 42–52)
HCT VFR BLD CALC: 38.9 % — LOW (ref 42–52)
HGB BLD-MCNC: 11.1 G/DL — LOW (ref 14–18)
HGB BLD-MCNC: 11.7 G/DL — LOW (ref 14–18)
MAGNESIUM SERPL-MCNC: 2 MG/DL — SIGNIFICANT CHANGE UP (ref 1.8–2.4)
MCHC RBC-ENTMCNC: 24 PG — LOW (ref 27–31)
MCHC RBC-ENTMCNC: 24.1 PG — LOW (ref 27–31)
MCHC RBC-ENTMCNC: 30.1 G/DL — LOW (ref 32–37)
MCHC RBC-ENTMCNC: 30.7 G/DL — LOW (ref 32–37)
MCV RBC AUTO: 78.5 FL — LOW (ref 80–94)
MCV RBC AUTO: 79.9 FL — LOW (ref 80–94)
NRBC # BLD: 0 /100 WBCS — SIGNIFICANT CHANGE UP (ref 0–0)
NRBC # BLD: 0 /100 WBCS — SIGNIFICANT CHANGE UP (ref 0–0)
PLATELET # BLD AUTO: 274 K/UL — SIGNIFICANT CHANGE UP (ref 130–400)
PLATELET # BLD AUTO: 289 K/UL — SIGNIFICANT CHANGE UP (ref 130–400)
PMV BLD: 10.1 FL — SIGNIFICANT CHANGE UP (ref 7.4–10.4)
PMV BLD: 10.1 FL — SIGNIFICANT CHANGE UP (ref 7.4–10.4)
POTASSIUM SERPL-MCNC: 4.4 MMOL/L — SIGNIFICANT CHANGE UP (ref 3.5–5)
POTASSIUM SERPL-SCNC: 4.4 MMOL/L — SIGNIFICANT CHANGE UP (ref 3.5–5)
RBC # BLD: 4.61 M/UL — LOW (ref 4.7–6.1)
RBC # BLD: 4.87 M/UL — SIGNIFICANT CHANGE UP (ref 4.7–6.1)
RBC # FLD: 16.2 % — HIGH (ref 11.5–14.5)
RBC # FLD: 16.4 % — HIGH (ref 11.5–14.5)
SODIUM SERPL-SCNC: 132 MMOL/L — LOW (ref 135–146)
WBC # BLD: 5.3 K/UL — SIGNIFICANT CHANGE UP (ref 4.8–10.8)
WBC # BLD: 6.76 K/UL — SIGNIFICANT CHANGE UP (ref 4.8–10.8)
WBC # FLD AUTO: 5.3 K/UL — SIGNIFICANT CHANGE UP (ref 4.8–10.8)
WBC # FLD AUTO: 6.76 K/UL — SIGNIFICANT CHANGE UP (ref 4.8–10.8)

## 2024-03-19 PROCEDURE — 99239 HOSP IP/OBS DSCHRG MGMT >30: CPT

## 2024-03-19 PROCEDURE — 93010 ELECTROCARDIOGRAM REPORT: CPT

## 2024-03-19 PROCEDURE — 93306 TTE W/DOPPLER COMPLETE: CPT | Mod: 26

## 2024-03-19 RX ORDER — CLOPIDOGREL BISULFATE 75 MG/1
1 TABLET, FILM COATED ORAL
Refills: 0 | DISCHARGE

## 2024-03-19 RX ORDER — APIXABAN 2.5 MG/1
1 TABLET, FILM COATED ORAL
Qty: 60 | Refills: 0
Start: 2024-03-19 | End: 2024-04-17

## 2024-03-19 RX ORDER — POLYETHYLENE GLYCOL 3350 17 G/17G
17 POWDER, FOR SOLUTION ORAL
Qty: 510 | Refills: 0
Start: 2024-03-19 | End: 2024-04-17

## 2024-03-19 RX ORDER — CLOPIDOGREL BISULFATE 75 MG/1
1 TABLET, FILM COATED ORAL
Qty: 30 | Refills: 0
Start: 2024-03-19 | End: 2024-04-17

## 2024-03-19 RX ORDER — FUROSEMIDE 40 MG
1 TABLET ORAL
Qty: 60 | Refills: 0
Start: 2024-03-19 | End: 2024-04-17

## 2024-03-19 RX ADMIN — Medication 5 UNIT(S): at 11:28

## 2024-03-19 RX ADMIN — Medication 40 MILLIGRAM(S): at 05:29

## 2024-03-19 RX ADMIN — Medication 5 UNIT(S): at 11:02

## 2024-03-19 RX ADMIN — POLYETHYLENE GLYCOL 3350 17 GRAM(S): 17 POWDER, FOR SOLUTION ORAL at 11:26

## 2024-03-19 RX ADMIN — Medication 100 MILLIGRAM(S): at 05:29

## 2024-03-19 RX ADMIN — Medication 1: at 08:00

## 2024-03-19 RX ADMIN — SPIRONOLACTONE 25 MILLIGRAM(S): 25 TABLET, FILM COATED ORAL at 05:31

## 2024-03-19 RX ADMIN — FAMOTIDINE 40 MILLIGRAM(S): 10 INJECTION INTRAVENOUS at 05:28

## 2024-03-19 RX ADMIN — CLOPIDOGREL BISULFATE 75 MILLIGRAM(S): 75 TABLET, FILM COATED ORAL at 11:26

## 2024-03-19 RX ADMIN — APIXABAN 5 MILLIGRAM(S): 2.5 TABLET, FILM COATED ORAL at 05:28

## 2024-03-19 RX ADMIN — Medication 3: at 11:27

## 2024-03-19 NOTE — DISCHARGE NOTE NURSING/CASE MANAGEMENT/SOCIAL WORK - PATIENT PORTAL LINK FT
You can access the FollowMyHealth Patient Portal offered by [facility] by registering at the following website: [email or web address]. By joining [facility]’s FollowMyHealth portal, you will also be able to view your health information using other applications (apps) compatible with our system.

## 2024-03-19 NOTE — DISCHARGE NOTE PROVIDER - ATTENDING DISCHARGE PHYSICAL EXAMINATION:
patient seen at bedside. patient wants to leave ASAP. he told me that he does not ant to stay and will follow his cardiologist., on room air. no symptoms. patient was advised to follow up with pcp and specialists as scheduled and also advised to go to ed in case of any new or worsening symptoms.  patient seen at bedside. patient wants to leave ASAP. he told me that he does not ant to stay and will follow his cardiologist., on room air. no symptoms. patient was advised to follow up with pcp and specialists as scheduled and also advised to go to ed in case of any new or worsening symptoms. patient verbalized understanding and agreed with plan.

## 2024-03-19 NOTE — DISCHARGE NOTE PROVIDER - NSDCCPCAREPLAN_GEN_ALL_CORE_FT
PRINCIPAL DISCHARGE DIAGNOSIS  Diagnosis: Acute systolic congestive heart failure  Assessment and Plan of Treatment: You came to the hospital due to difficulty breathing and increased leg swelling for 1 week due to not taking your spironolactone and hydrochlorothiazide. You also reported chest pain which improved prior to arrival to the hospital. You did not have any signs of myocardial infarction but you did have signs of heart failure exacerbation likely due to not taking your medications. You were restarted on those medications while you were here and also given lasix to help you urinate the excess fluid. Your breathing and leg swelling has improved. You are now ready for discharge. Please follow up with your cardiologist, Dr. Lam, within 1 week. Also, please follow up with your primary care doctor within 2 weeks. If you notice your breathing or leg swelling significantly worsening, please come back to the emergency room.

## 2024-03-19 NOTE — PROGRESS NOTE ADULT - ASSESSMENT
Patient is a 57 yo M w/PMH of HTN, diabetes, BPH and CAD s/p PCI ( cardiologist Dr Genaro Thomas, s/p stress test and cardiac cath in March 2023 ), afib on eliquis, HFpEF on lasix (last TTE showing LVEF 70%) who presented to the ED w/dyspnea on exertion and increased lower extremity edema for a week w/o orthopnea w/mild chest pain for 1 day that has resolved. Of note, patient has stopped taking HCTZ on his own and stopped taking spironolactone because the pharmacy forgot to send it. In the ED, his vitals were notable for hypertension of 169/89, tachycardia of 115, and hypoxia of 88% on 4L NC and 97% on bipap, w/labs notable for pro-BNP 1743 (964 in Dec 2023), trop 23, and VBG showing pH 7.38, pCO2 50, bicarb 30, and lactate 1.6, w/EKG showing afib w/RVR, w/CXR showing Increasing, diffuse bilateral interstitial pulmonary opacities and w/POCUS showing B-lines and small right pleural effusion. He is now s/p 40 mg of lasix in the ED and admitted for management of acute hypoxic respiratory failure 2/2 HFpEF exacerbation.    *Obtain med rec from daughter    #Acute hypoxic respiratory failure 2/2 to acute on chronic HfpEF exacerbation   - Patient tachypneic, having difficulty completing sentences on admission   - Hypoxic on room air requiring nonrebreather initially, placed on BiPAP with significant improvement in work of breathing.  - Vitals: T 97, , /89, spo2 88% on 4L NC--> 97% on Bipap   - trop 23, pro-BNP 1743 ( was 964 in Dec )  - CXR: Increasing, diffuse bilateral interstitial pulmonary opacities.  - EKG: afib w/RVR  - last TTE Dec 2023: EF 70%   - s/p lasix 40 mg IV once in ED  - c/w lasix 40 mg IV BID  - strict I&Os, daily standing weight, fluid restrict 1.5 L  - repeat CXR in 24-48 hrs   - Pending TTE  - wean off bipap as tolerated   - trend trop    #Afib w/RVR  - c/w eliquis and metoprolol    #CAD s/p PCI   #HTN  #HLD   - c/w plavix, lopressor 100 mg OD, statin   - c/w enalapril 40 mg OD, spironolactone 25 mg OD, HCTZ 25 mg OD     #DM   - on home novolog 35 units ( OD and sometimes BID ), ozempic and oral anti-diabetics   - c/w lantus 10U and lisop 5 TID   - monitor FS, target 140-180   - ISS     #BPH   - c/w tamsulosin     #MISC  -DVT ppx: eliquis   -GI ppx: famotidine   -Diet: DASH/CC  -Activity: as tolerated   -Dispo: floor    Patient is a 57 yo M w/PMH of HTN, diabetes, BPH and CAD s/p PCI ( cardiologist Dr Genaro Thomas, s/p stress test and cardiac cath in March 2023 ), afib on eliquis, HFpEF on lasix (last TTE showing LVEF 70%) who presented to the ED w/dyspnea on exertion and increased lower extremity edema for a week w/o orthopnea w/mild chest pain for 1 day that has resolved. Of note, patient has stopped taking HCTZ on his own and stopped taking spironolactone because the pharmacy forgot to send it. In the ED, his vitals were notable for hypertension of 169/89, tachycardia of 115, and hypoxia of 88% on 4L NC and 97% on bipap, w/labs notable for pro-BNP 1743 (964 in Dec 2023), trop 23, and VBG showing pH 7.38, pCO2 50, bicarb 30, and lactate 1.6, w/EKG showing afib w/RVR, w/CXR showing Increasing, diffuse bilateral interstitial pulmonary opacities and w/POCUS showing B-lines and small right pleural effusion. He is now s/p 40 mg of lasix in the ED and admitted for management of acute hypoxic respiratory failure 2/2 HFpEF exacerbation.    #Acute hypoxic respiratory failure 2/2 to acute on chronic HfpEF exacerbation (improved  Patient's breathing has improved, no more crackles, LE edema improved  - Vitals: T 97, , /89, spo2 88% on 4L NC--> 97% on Bipap   - trop 23, pro-BNP 1743 ( was 964 in Dec )  - CXR: Increasing, diffuse bilateral interstitial pulmonary opacities.  - EKG: afib w/RVR  - last TTE Dec 2023: EF 70%   - s/p lasix 40 mg IV once in ED  - c/w lasix 40 mg IV BID  - strict I&Os, daily standing weight, fluid restrict 1.5 L  - repeat CXR in 24-48 hrs   - Pending TTE  - wean off bipap as tolerated   - trend trop    #Afib w/RVR  - c/w eliquis and metoprolol    #CAD s/p PCI   #HTN  #HLD   - c/w plavix, lopressor 100 mg OD, statin   - c/w enalapril 40 mg OD, spironolactone 25 mg OD, HCTZ 25 mg OD     #DM   - on home novolog 35 units ( OD and sometimes BID ), ozempic and oral anti-diabetics   - c/w lantus 10U and lisop 5 TID   - monitor FS, target 140-180   - ISS     #BPH   - c/w tamsulosin     #MISC  -DVT ppx: eliquis   -GI ppx: famotidine   -Diet: DASH/CC  -Activity: as tolerated   -Dispo: To home today    Handoff: Discharge to home today

## 2024-03-19 NOTE — DISCHARGE NOTE PROVIDER - DISCHARGING ATTENDING PHYSICIAN:
Patient: Jay Carter  ROBOTIC RADICAL RETROPUBIC PROSTATECTOMY WITH BILATERAL PELVIC LYMPH NODE DISSECTION  Anesthesia type: general    Patient location: PACU  Last vitals:   Vitals:    02/06/19 1445   BP: 172/84   Pulse: 80   Resp: (!) 31   Temp: 36.8  C (98.2  F)   SpO2: 94%     Post vital signs: stable  Level of consciousness: awake and responds to simple questions  Post-anesthesia pain: pain controlled  Post-anesthesia nausea and vomiting: no  Pulmonary: unassisted, return to baseline  Cardiovascular: stable and blood pressure at baseline  Hydration: adequate  Anesthetic events: no    QCDR Measures:  ASA# 11 - Joan-op Cardiac Arrest: ASA11B - Patient did NOT experience unanticipated cardiac arrest  ASA# 12 - Joan-op Mortality Rate: ASA12B - Patient did NOT die  ASA# 13 - PACU Re-Intubation Rate: ASA13B - Patient did NOT require a new airway mgmt  ASA# 10 - Composite Anes Safety: ASA10A - No serious adverse event    Additional Notes:  
Luke Whalen MD

## 2024-03-19 NOTE — DISCHARGE NOTE PROVIDER - NSDCFUSCHEDAPPT_GEN_ALL_CORE_FT
Toby Mei  [facility]  [facility] [address] [place] [address] Av  Scheduled Appointment: 04/23/2024

## 2024-03-19 NOTE — PROGRESS NOTE ADULT - SUBJECTIVE AND OBJECTIVE BOX
----------Daily Progress Note----------    HISTORY OF PRESENT ILLNESS:  Patient is a 57 yo M w/PMH of HTN, diabetes, BPH and CAD s/p PCI ( cardiologist Dr Genaro Thomas, s/p stress test and cardiac cath in March 2023 ), afib on eliquis, HFpEF on lasix (last TTE showing LVEF 70%) who presented to the ED w/dyspnea on exertion and increased lower extremity edema for a week w/o orthopnea w/mild chest pain for 1 day that has resolved. Of note, patient has stopped taking HCTZ on his own and stopped taking spironolactone because the pharmacy forgot to send it. In the ED, his vitals were notable for hypertension of 169/89, tachycardia of 115, and hypoxia of 88% on 4L NC and 97% on bipap, w/labs notable for pro-BNP 1743 (964 in Dec 2023), trop 23, and VBG showing pH 7.38, pCO2 50, bicarb 30, and lactate 1.6, w/EKG showing afib w/RVR, w/CXR showing Increasing, diffuse bilateral interstitial pulmonary opacities and w/POCUS showing B-lines and small right pleural effusion. He is now s/p 40 mg of lasix in the ED and admitted for management of acute hypoxic respiratory failure 2/2 HFpEF exacerbation.     Today is hospital day 2d.     INTERVAL HOSPITAL COURSE / OVERNIGHT EVENTS:  O/N events:      24 hr events:       Patient was examined and seen at bedside.     Review of Systems: Otherwise unremarkable     <<<<<PAST MEDICAL & SURGICAL HISTORY>>>>>  CAD S/P percutaneous coronary angioplasty    DM (diabetes mellitus)    Hyperlipidemia    HTN (hypertension)      ALLERGIES  No Known Allergies      Home Medications:  atorvastatin 80 mg oral tablet: 1 tab(s) orally once a day (17 Mar 2024 17:35)  enalapril 20 mg oral tablet: 2 tab(s) orally once a day (17 Mar 2024 17:43)  famotidine 40 mg oral tablet: 1 tab(s) orally 2 times a day (17 Mar 2024 17:35)  glipiZIDE 10 mg oral tablet: 1 tab(s) orally once a day (17 Mar 2024 17:35)  hydroCHLOROthiazide 25 mg oral tablet: 1 tab(s) orally once a day (17 Mar 2024 17:35)  metFORMIN 1000 mg oral tablet: 1 tab(s) orally 2 times a day (17 Mar 2024 17:35)  NovoLOG Mix 70/30 FlexPen subcutaneous suspension: 35 unit(s) subcutaneous once a day (17 Mar 2024 17:33)  pioglitazone 30 mg oral tablet: 1 tab(s) orally once a day (17 Mar 2024 17:35)  Plavix 75 mg oral tablet: 1 tab(s) orally once a day (17 Mar 2024 17:35)  semaglutide 0.25 mg/0.5 mL (0.25 mg dose) subcutaneous solution: 0.25 milligram(s) subcutaneously once a week (17 Mar 2024 17:35)  spironolactone 25 mg oral tablet: 1 tab(s) orally once a day (17 Mar 2024 17:35)  tamsulosin 0.4 mg oral capsule: 1 orally once a day (17 Mar 2024 17:35)        MEDICATIONS  STANDING MEDICATIONS  apixaban 5 milliGRAM(s) Oral every 12 hours  atorvastatin 80 milliGRAM(s) Oral at bedtime  clopidogrel Tablet 75 milliGRAM(s) Oral daily  dextrose 5%. 1000 milliLiter(s) IV Continuous <Continuous>  dextrose 5%. 1000 milliLiter(s) IV Continuous <Continuous>  dextrose 50% Injectable 25 Gram(s) IV Push once  dextrose 50% Injectable 12.5 Gram(s) IV Push once  dextrose 50% Injectable 25 Gram(s) IV Push once  enalapril 40 milliGRAM(s) Oral daily  famotidine    Tablet 40 milliGRAM(s) Oral two times a day  furosemide   Injectable 40 milliGRAM(s) IV Push every 12 hours  glucagon  Injectable 1 milliGRAM(s) IntraMuscular once  hydrochlorothiazide 25 milliGRAM(s) Oral daily  influenza   Vaccine 0.5 milliLiter(s) IntraMuscular once  insulin glargine Injectable (LANTUS) 10 Unit(s) SubCutaneous at bedtime  insulin lispro (ADMELOG) corrective regimen sliding scale   SubCutaneous three times a day before meals  insulin lispro Injectable (ADMELOG) 5 Unit(s) SubCutaneous three times a day before meals  metoprolol tartrate 100 milliGRAM(s) Oral daily  polyethylene glycol 3350 17 Gram(s) Oral daily  spironolactone 25 milliGRAM(s) Oral daily  tamsulosin 0.4 milliGRAM(s) Oral at bedtime    PRN MEDICATIONS  dextrose Oral Gel 15 Gram(s) Oral once PRN    VITALS:  T(F): 98.2  HR: 77  BP: 119/64  RR: 18  SpO2: 96%    <<<<<PHYSICAL EXAM>>>>>  GENERAL: NAD  PULMONARY: BL middle lobe crackles, decreased breath sounds at bases  CARDIOVASCULAR: Irregular rhythm, tachycardic, S1-S2, no murmurs, rubs, or gallops  GASTROINTESTINAL: Soft, non-tender, non-distended, no guarding  SKIN/EXTREMITIES: Warm, 2+ tibialis posterior pulses, BL LE 3+ pitting edema  NEUROLOGIC/MUSCULOSKELETAL: AOx4, grossly moving all extremities, no focal deficits    <<<<<LABS>>>>>                        10.8   4.98  )-----------( 278      ( 18 Mar 2024 06:30 )             34.8     03-18    138  |  99  |  21<H>  ----------------------------<  215<H>  4.1   |  27  |  1.2    Ca    8.5      18 Mar 2024 06:30    TPro  6.6  /  Alb  3.7  /  TBili  0.5  /  DBili  x   /  AST  19  /  ALT  23  /  AlkPhos  82  03-18      Urinalysis Basic - ( 18 Mar 2024 06:30 )    Color: x / Appearance: x / SG: x / pH: x  Gluc: 215 mg/dL / Ketone: x  / Bili: x / Urobili: x   Blood: x / Protein: x / Nitrite: x   Leuk Esterase: x / RBC: x / WBC x   Sq Epi: x / Non Sq Epi: x / Bacteria: x          [id number]        <<<<<RADIOLOGY>>>>>  CXR (3/18): There are increasing diffuse bilateral interstitial pulmonary opacities.  No pleural effusion or pneumothorax is seen.    ----------------------------------------------------------------------------------------------------------------------------------------------------------------------------------------------- ----------Daily Progress Note----------    HISTORY OF PRESENT ILLNESS:  Patient is a 59 yo M w/PMH of HTN, diabetes, BPH and CAD s/p PCI ( cardiologist Dr Genaro Thomas, s/p stress test and cardiac cath in March 2023 ), afib on eliquis, HFpEF on lasix (last TTE showing LVEF 70%) who presented to the ED w/dyspnea on exertion and increased lower extremity edema for a week w/o orthopnea w/mild chest pain for 1 day that has resolved. Of note, patient has stopped taking HCTZ on his own and stopped taking spironolactone because the pharmacy forgot to send it. In the ED, his vitals were notable for hypertension of 169/89, tachycardia of 115, and hypoxia of 88% on 4L NC and 97% on bipap, w/labs notable for pro-BNP 1743 (964 in Dec 2023), trop 23, and VBG showing pH 7.38, pCO2 50, bicarb 30, and lactate 1.6, w/EKG showing afib w/RVR, w/CXR showing Increasing, diffuse bilateral interstitial pulmonary opacities and w/POCUS showing B-lines and small right pleural effusion. He is now s/p 40 mg of lasix in the ED and admitted for management of acute hypoxic respiratory failure 2/2 HFpEF exacerbation.     Today is hospital day 2d.     INTERVAL HOSPITAL COURSE / OVERNIGHT EVENTS:  O/N events:  None    24 hr events:   None    Patient was examined and seen at bedside. Reports improvement in shortness of breath, spoke to patient about being compliant with medications, good diet, and exercise. Patient endorsed wish to go home.    Review of Systems: Otherwise unremarkable     <<<<<PAST MEDICAL & SURGICAL HISTORY>>>>>  CAD S/P percutaneous coronary angioplasty    DM (diabetes mellitus)    Hyperlipidemia    HTN (hypertension)      ALLERGIES  No Known Allergies      Home Medications:  atorvastatin 80 mg oral tablet: 1 tab(s) orally once a day (17 Mar 2024 17:35)  enalapril 20 mg oral tablet: 2 tab(s) orally once a day (17 Mar 2024 17:43)  famotidine 40 mg oral tablet: 1 tab(s) orally 2 times a day (17 Mar 2024 17:35)  glipiZIDE 10 mg oral tablet: 1 tab(s) orally once a day (17 Mar 2024 17:35)  hydroCHLOROthiazide 25 mg oral tablet: 1 tab(s) orally once a day (17 Mar 2024 17:35)  metFORMIN 1000 mg oral tablet: 1 tab(s) orally 2 times a day (17 Mar 2024 17:35)  NovoLOG Mix 70/30 FlexPen subcutaneous suspension: 35 unit(s) subcutaneous once a day (17 Mar 2024 17:33)  pioglitazone 30 mg oral tablet: 1 tab(s) orally once a day (17 Mar 2024 17:35)  Plavix 75 mg oral tablet: 1 tab(s) orally once a day (17 Mar 2024 17:35)  semaglutide 0.25 mg/0.5 mL (0.25 mg dose) subcutaneous solution: 0.25 milligram(s) subcutaneously once a week (17 Mar 2024 17:35)  spironolactone 25 mg oral tablet: 1 tab(s) orally once a day (17 Mar 2024 17:35)  tamsulosin 0.4 mg oral capsule: 1 orally once a day (17 Mar 2024 17:35)        MEDICATIONS  STANDING MEDICATIONS  apixaban 5 milliGRAM(s) Oral every 12 hours  atorvastatin 80 milliGRAM(s) Oral at bedtime  clopidogrel Tablet 75 milliGRAM(s) Oral daily  dextrose 5%. 1000 milliLiter(s) IV Continuous <Continuous>  dextrose 5%. 1000 milliLiter(s) IV Continuous <Continuous>  dextrose 50% Injectable 25 Gram(s) IV Push once  dextrose 50% Injectable 12.5 Gram(s) IV Push once  dextrose 50% Injectable 25 Gram(s) IV Push once  enalapril 40 milliGRAM(s) Oral daily  famotidine    Tablet 40 milliGRAM(s) Oral two times a day  furosemide   Injectable 40 milliGRAM(s) IV Push every 12 hours  glucagon  Injectable 1 milliGRAM(s) IntraMuscular once  hydrochlorothiazide 25 milliGRAM(s) Oral daily  influenza   Vaccine 0.5 milliLiter(s) IntraMuscular once  insulin glargine Injectable (LANTUS) 10 Unit(s) SubCutaneous at bedtime  insulin lispro (ADMELOG) corrective regimen sliding scale   SubCutaneous three times a day before meals  insulin lispro Injectable (ADMELOG) 5 Unit(s) SubCutaneous three times a day before meals  metoprolol tartrate 100 milliGRAM(s) Oral daily  polyethylene glycol 3350 17 Gram(s) Oral daily  spironolactone 25 milliGRAM(s) Oral daily  tamsulosin 0.4 milliGRAM(s) Oral at bedtime    PRN MEDICATIONS  dextrose Oral Gel 15 Gram(s) Oral once PRN    VITALS:  T(F): 98.2  HR: 77  BP: 119/64  RR: 18  SpO2: 96%    <<<<<PHYSICAL EXAM>>>>>  GENERAL: NAD  PULMONARY: Clear breath sounds, no crackles or wheezing  CARDIOVASCULAR: Irregular rhythm, tachycardic, S1-S2, no murmurs, rubs, or gallops  GASTROINTESTINAL: Soft, non-tender, non-distended, no guarding  SKIN/EXTREMITIES: Warm, 2+ tibialis posterior pulses, BL LE 1+ pitting edema  NEUROLOGIC/MUSCULOSKELETAL: AOx4, grossly moving all extremities, no focal deficits    <<<<<LABS>>>>>                        10.8   4.98  )-----------( 278      ( 18 Mar 2024 06:30 )             34.8     03-18    138  |  99  |  21<H>  ----------------------------<  215<H>  4.1   |  27  |  1.2    Ca    8.5      18 Mar 2024 06:30    TPro  6.6  /  Alb  3.7  /  TBili  0.5  /  DBili  x   /  AST  19  /  ALT  23  /  AlkPhos  82  03-18      Urinalysis Basic - ( 18 Mar 2024 06:30 )    Color: x / Appearance: x / SG: x / pH: x  Gluc: 215 mg/dL / Ketone: x  / Bili: x / Urobili: x   Blood: x / Protein: x / Nitrite: x   Leuk Esterase: x / RBC: x / WBC x   Sq Epi: x / Non Sq Epi: x / Bacteria: x          [id number]        <<<<<RADIOLOGY>>>>>  CXR (3/18): There are increasing diffuse bilateral interstitial pulmonary opacities.  No pleural effusion or pneumothorax is seen.    -----------------------------------------------------------------------------------------------------------------------------------------------------------------------------------------------

## 2024-03-19 NOTE — PHYSICAL THERAPY INITIAL EVALUATION ADULT - GENERAL OBSERVATIONS, REHAB EVAL
Chart reviewed, spoke with RN, pt encountered seated at eob, NAD, agreeable, IE completed, independent with all functional mobility, does not require Acute Rehab at this time, recommend unit amb as rex, d/c PT, pt educated on energy conservation, breathing exs, verbalizes understanding.

## 2024-03-19 NOTE — DISCHARGE NOTE PROVIDER - HOSPITAL COURSE
Patient is a 57 yo M w/PMH of HTN, diabetes, BPH and CAD s/p PCI ( cardiologist Dr Genaro Thomas, s/p stress test and cardiac cath in March 2023 ), afib on eliquis, HFpEF on lasix (last TTE showing LVEF 70%) who presented to the ED w/dyspnea on exertion and increased lower extremity edema for a week w/o orthopnea w/mild chest pain for 1 day that has resolved and was admitted for management of acute hypoxic respiratory failure 2/2 HFpEF exacerbation. His lower extremity edema has improved and his hypoxia has resolved. He is now ready for discharge.    #Acute hypoxic respiratory failure 2/2 to acute on chronic HfpEF exacerbation (improved  Patient's breathing has improved, no more crackles, LE edema improved  - c/w PO lasix 40 mg BID  - f/u w/cardiology outpatient    #Afib w/RVR  - c/w eliquis and metoprolol    #CAD s/p PCI   #HTN  #HLD   - c/w plavix, lopressor 100 mg OD, statin   - c/w enalapril 40 mg OD, spironolactone 25 mg OD, HCTZ 25 mg OD     #DM   - c/w home novolog 35 units ( OD and sometimes BID ), ozempic and oral anti-diabetics     #BPH   - c/w tamsulosin    Patient is a 59 yo M w/PMH of HTN, diabetes, BPH and CAD s/p PCI ( cardiologist Dr Genaro Thomas, s/p stress test and cardiac cath in March 2023 ), afib on eliquis, HFpEF on lasix (last TTE showing LVEF 70%) who presented to the ED w/dyspnea on exertion and increased lower extremity edema for a week w/o orthopnea w/mild chest pain for 1 day that has resolved and was admitted for management of acute hypoxic respiratory failure 2/2 HFpEF exacerbation. His lower extremity edema has improved and his hypoxia has resolved. He is now ready for discharge.    #Acute hypoxic respiratory failure 2/2 to acute on chronic HfpEF exacerbation (resolved)  Patient's breathing has improved, no more crackles, LE edema improved  - c/w PO lasix 40 mg BID  - TTE (3/19): LVEF 55%, PASP 48.9 mmHg  - f/u w/cardiology outpatient    #Afib (no longer in RVR)  - ECG (3/19): Afib, rate controlled, HR 91  - c/w eliquis and metoprolol    #CAD s/p PCI   #HTN  #HLD   - c/w plavix, lopressor 100 mg OD, statin   - c/w enalapril 40 mg OD, spironolactone 25 mg OD, HCTZ 25 mg OD     #DM   - c/w home novolog 35 units ( OD and sometimes BID ), ozempic and oral anti-diabetics     #BPH   - c/w tamsulosin

## 2024-03-19 NOTE — PHYSICAL THERAPY INITIAL EVALUATION ADULT - ADDITIONAL COMMENTS
Lives with family, no steps to enter, stays on 1st floor mostly with half bath, kitchen and shower upstairs, 10 steps with RHR.

## 2024-03-19 NOTE — DISCHARGE NOTE PROVIDER - CARE PROVIDER_API CALL
Martha Lam  Cardiology  [address] [place] [address]  Phone: [phone]  Fax: [phone] [place] Patient  Follow Up Time: 1 week

## 2024-03-19 NOTE — DISCHARGE NOTE PROVIDER - NSDCFUADDAPPT_GEN_ALL_CORE_FT
follow up with pcp and cardiology as scheduled.   we increased your lasix 40 mg twice daily. follow up with your PCP/cardiology so that they can adjust your dose if required

## 2024-03-19 NOTE — DISCHARGE NOTE PROVIDER - NSDCMRMEDTOKEN_GEN_ALL_CORE_FT
atorvastatin 80 mg oral tablet: 1 tab(s) orally once a day  Eliquis 5 mg oral tablet: 1 tab(s) orally 2 times a day For pricing at [facility]  enalapril 20 mg oral tablet: 2 tab(s) orally once a day  famotidine 40 mg oral tablet: 1 tab(s) orally 2 times a day  glipiZIDE 10 mg oral tablet: 1 tab(s) orally once a day  hydroCHLOROthiazide 25 mg oral tablet: 1 tab(s) orally once a day  Lasix 40 mg oral tablet: 1 tab(s) orally once a day  Lopressor 100 mg oral tablet: 1 tab(s) orally once a day  metFORMIN 1000 mg oral tablet: 1 tab(s) orally 2 times a day  NovoLOG Mix 70/30 FlexPen subcutaneous suspension: 35 unit(s) subcutaneous once a day  pioglitazone 30 mg oral tablet: 1 tab(s) orally once a day  Plavix 75 mg oral tablet: 1 tab(s) orally once a day  semaglutide 0.25 mg/0.5 mL (0.25 mg dose) subcutaneous solution: 0.25 milligram(s) subcutaneously once a week  spironolactone 25 mg oral tablet: 1 tab(s) orally once a day  tamsulosin 0.4 mg oral capsule: 1 orally once a day   atorvastatin 80 mg oral tablet: 1 tab(s) orally once a day  Eliquis 5 mg oral tablet: 1 tab(s) orally 2 times a day For pricing at [facility]  enalapril 20 mg oral tablet: 2 tab(s) orally once a day  famotidine 40 mg oral tablet: 1 tab(s) orally 2 times a day  glipiZIDE 10 mg oral tablet: 1 tab(s) orally once a day  hydroCHLOROthiazide 25 mg oral tablet: 1 tab(s) orally once a day  Lopressor 100 mg oral tablet: 1 tab(s) orally once a day  metFORMIN 1000 mg oral tablet: 1 tab(s) orally 2 times a day  NovoLOG Mix 70/30 FlexPen subcutaneous suspension: 35 unit(s) subcutaneous once a day  pioglitazone 30 mg oral tablet: 1 tab(s) orally once a day  Plavix 75 mg oral tablet: 1 tab(s) orally once a day  semaglutide 0.25 mg/0.5 mL (0.25 mg dose) subcutaneous solution: 0.25 milligram(s) subcutaneously once a week  spironolactone 25 mg oral tablet: 1 tab(s) orally once a day  tamsulosin 0.4 mg oral capsule: 1 orally once a day   atorvastatin 80 mg oral tablet: 1 tab(s) orally once a day  Eliquis 5 mg oral tablet: 1 tab(s) orally 2 times a day  enalapril 20 mg oral tablet: 2 tab(s) orally once a day  famotidine 40 mg oral tablet: 1 tab(s) orally 2 times a day  furosemide 40 mg oral tablet: 1 tab(s) orally 2 times a day  glipiZIDE 10 mg oral tablet: 1 tab(s) orally once a day  hydroCHLOROthiazide 25 mg oral tablet: 1 tab(s) orally once a day  Lopressor 100 mg oral tablet: 1 tab(s) orally once a day  metFORMIN 1000 mg oral tablet: 1 tab(s) orally 2 times a day  NovoLOG Mix 70/30 FlexPen subcutaneous suspension: 35 unit(s) subcutaneous once a day  pioglitazone 30 mg oral tablet: 1 tab(s) orally once a day  Plavix 75 mg oral tablet: 1 tab(s) orally once a day  polyethylene glycol 3350 oral powder for reconstitution: 17 gram(s) orally once a day  semaglutide 0.25 mg/0.5 mL (0.25 mg dose) subcutaneous solution: 0.25 milligram(s) subcutaneously once a week  spironolactone 25 mg oral tablet: 1 tab(s) orally once a day  tamsulosin 0.4 mg oral capsule: 1 orally once a day

## 2024-03-19 NOTE — PHYSICAL THERAPY INITIAL EVALUATION ADULT - PERTINENT HX OF CURRENT PROBLEM, REHAB EVAL
59 yo M w/PMH of HTN, diabetes, BPH and CAD s/p PCI, afib on eliquis, HFpEF on lasix who presented to the ED w/dyspnea on exertion and increased lower extremity edema for a week w/o orthopnea w/mild chest pain for 1 day that has resolved.

## 2024-03-19 NOTE — DISCHARGE NOTE NURSING/CASE MANAGEMENT/SOCIAL WORK - NSDCPEFALRISK_GEN_ALL_CORE
For information on Fall & Injury Prevention, visit: [email or web address] OR  [email or web address] OR  https://www.cdc.gov/steadi/patient.html

## 2024-03-27 DIAGNOSIS — Z79.82 LONG TERM (CURRENT) USE OF ASPIRIN: ICD-10-CM

## 2024-03-27 DIAGNOSIS — I50.33 ACUTE ON CHRONIC DIASTOLIC (CONGESTIVE) HEART FAILURE: ICD-10-CM

## 2024-03-27 DIAGNOSIS — Z79.01 LONG TERM (CURRENT) USE OF ANTICOAGULANTS: ICD-10-CM

## 2024-03-27 DIAGNOSIS — N40.0 BENIGN PROSTATIC HYPERPLASIA WITHOUT LOWER URINARY TRACT SYMPTOMS: ICD-10-CM

## 2024-03-27 DIAGNOSIS — I11.0 HYPERTENSIVE HEART DISEASE WITH HEART FAILURE: ICD-10-CM

## 2024-03-27 DIAGNOSIS — E11.9 TYPE 2 DIABETES MELLITUS WITHOUT COMPLICATIONS: ICD-10-CM

## 2024-03-27 DIAGNOSIS — J96.01 ACUTE RESPIRATORY FAILURE WITH HYPOXIA: ICD-10-CM

## 2024-03-27 DIAGNOSIS — I48.91 UNSPECIFIED ATRIAL FIBRILLATION: ICD-10-CM

## 2024-03-27 DIAGNOSIS — Z79.84 LONG TERM (CURRENT) USE OF ORAL HYPOGLYCEMIC DRUGS: ICD-10-CM

## 2024-03-27 DIAGNOSIS — I25.10 ATHEROSCLEROTIC HEART DISEASE OF NATIVE CORONARY ARTERY WITHOUT ANGINA PECTORIS: ICD-10-CM

## 2024-03-27 DIAGNOSIS — Z79.4 LONG TERM (CURRENT) USE OF INSULIN: ICD-10-CM

## 2024-04-23 ENCOUNTER — APPOINTMENT (OUTPATIENT)
Dept: PULMONOLOGY | Facility: CLINIC | Age: 59
End: 2024-04-23

## 2024-08-21 ENCOUNTER — EMERGENCY (EMERGENCY)
Facility: HOSPITAL | Age: 59
LOS: 0 days | Discharge: HOME | End: 2024-08-21
Attending: STUDENT IN AN ORGANIZED HEALTH CARE EDUCATION/TRAINING PROGRAM
Payer: COMMERCIAL

## 2024-08-21 VITALS
WEIGHT: 315 LBS | OXYGEN SATURATION: 95 % | DIASTOLIC BLOOD PRESSURE: 79 MMHG | TEMPERATURE: 98 F | SYSTOLIC BLOOD PRESSURE: 136 MMHG | RESPIRATION RATE: 18 BRPM | HEART RATE: 89 BPM | HEIGHT: 70 IN

## 2024-08-21 DIAGNOSIS — Z53.21 PROCEDURE AND TREATMENT NOT CARRIED OUT DUE TO PATIENT LEAVING PRIOR TO BEING SEEN BY HEALTH CARE PROVIDER: ICD-10-CM

## 2024-08-21 DIAGNOSIS — M54.2 CERVICALGIA: ICD-10-CM

## 2024-08-21 PROCEDURE — 99281 EMR DPT VST MAYX REQ PHY/QHP: CPT

## 2024-08-21 PROCEDURE — L9991: CPT
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
## 2025-01-30 NOTE — PROGRESS NOTE ADULT - ASSESSMENT
Date of Service:  01/30/2025    The patient underwent an updated home sleep study September 17, 2024, confirming mild sleep apnea with a respiratory event index of 14 and with repetitive, sawtooth pattern, likely REM-related oxygen desaturations to as low as 83% saturation.  The previous sleep study when the patient was substantially heavier in weight showed severe disease in 2015, for which the patient used CPAP for several months only.    We again reviewed the spectrum of treatment options available, and the patient was again given lists of dentists and otolaryngologists should he decide alternative therapies.  We reviewed criteria for hypoglossal nerve stimulation, the role of the [facility], the availability of different CPAP masks and strategies to help him obtain maximum benefit from CPAP.  We reviewed the interactions with testosterone replacement therapy.  Orders are sent to [facility] for auto-titrate CPAP with an initial range of 5 cm to 15 cm, and we will check a data download 6 weeks thereafter.        Dictated By:  Silvio Swanson MD  Signing Provider:  Silvio Swanson MD    MNK/AQT  D:  01/30/2025 09:21:50 AM  T:  01/30/2025 11:03:23 AM  Job:  [id number]        57 yo M patient with a PMH of HTN, diabetes, BPH and CAD s/p PCI (last one on 3/2023), presents to the ED with dyspnea on exertion. EP was called due to sinus pause on telemetry at 1:48 am on 12/2/2023.   Patient denies h/o dizziness, presyncope, or syncope in the past. Patient reports sleeping at the time of the pause and does not recall anything unusual.  Metoprolol on hold >36hrs   now developed tachy with -140s, presumed new    new onset AF  caro with pauses at night  SARIKA, not treated  CAD, MACY 3/2023  HTN, DM      con't tele  restart Metoprolol 25mg bid  Start Eliquis 5mg bid now  stop Aspirin, con't Plavix   Pulm eval, CPAP at night  TTE noted  Check TSH  Recommend pulmonary consult for sleep apnea evaluation as inpatient  Keep electrolytes K>4, Mg>2  Continue telemetry monitoring     57 yo M patient with a PMH of HTN, diabetes, BPH and CAD s/p PCI (last one on 3/2023), presents to the ED with dyspnea on exertion. EP was called due to sinus pause on telemetry at 1:48 am on 12/2/2023.   Patient denies h/o dizziness, presyncope, or syncope in the past. Patient reports sleeping at the time of the pause and does not recall anything unusual.  Metoprolol on hold >36hrs   now developed tachy with -140s, presumed new    new onset AF  caro with pauses at night  SARIKA, not treated  CAD, MACY 3/2023  HTN, DM      con't tele  restart Metoprolol 25mg bid and up titrate slowly   Start Eliquis 5mg bid now  stop Aspirin, con't Plavix   Pulm eval, CPAP at night  TTE noted  Check TSH  Recommend pulmonary consult for sleep apnea evaluation as inpatient  Keep electrolytes K>4, Mg>2  Continue telemetry monitoring  [facility] on discharge      59 yo M patient with a PMH of HTN, diabetes, BPH and CAD s/p PCI (last one on 3/2023), presents to the ED with dyspnea on exertion. EP was called due to sinus pause on telemetry at 1:48 am on 12/2/2023.   Patient denies h/o dizziness, presyncope, or syncope in the past. Patient reports sleeping at the time of the pause and does not recall anything unusual.  Metoprolol on hold >36hrs   now developed tachy with -140s, presumed new    new onset AF  caro with pauses at night  SARIKA, not treated  CAD, MACY 3/2023  HTN, DM      con't tele  restart Metoprolol 25mg bid and up titrate slowly   Start Eliquis 5mg bid now  stop Aspirin, con't Plavix   Pulm eval, CPAP at night  TTE noted  Check TSH  Recommend pulmonary consult for sleep apnea evaluation as inpatient  Keep electrolytes K>4, Mg>2  Continue telemetry monitoring  [facility] on discharge